# Patient Record
Sex: FEMALE | Race: WHITE | Employment: FULL TIME | URBAN - METROPOLITAN AREA
[De-identification: names, ages, dates, MRNs, and addresses within clinical notes are randomized per-mention and may not be internally consistent; named-entity substitution may affect disease eponyms.]

---

## 2017-05-03 ENCOUNTER — OFFICE VISIT (OUTPATIENT)
Dept: OBGYN CLINIC | Age: 32
End: 2017-05-03

## 2017-05-03 VITALS
HEIGHT: 69 IN | SYSTOLIC BLOOD PRESSURE: 130 MMHG | WEIGHT: 177 LBS | BODY MASS INDEX: 26.22 KG/M2 | DIASTOLIC BLOOD PRESSURE: 84 MMHG

## 2017-05-03 DIAGNOSIS — Z01.419 WELL FEMALE EXAM WITH ROUTINE GYNECOLOGICAL EXAM: Primary | ICD-10-CM

## 2017-05-03 DIAGNOSIS — Z01.419 ENCOUNTER FOR GYNECOLOGICAL EXAMINATION WITHOUT ABNORMAL FINDING: ICD-10-CM

## 2017-05-03 NOTE — MR AVS SNAPSHOT
Visit Information Date & Time Provider Department Dept. Phone Encounter #  
 5/3/2017 10:10 AM Alexandru Mayberry MD Alvin Patel [phone] [id number] Upcoming Health Maintenance Date Due INFLUENZA AGE 9 TO ADULT 8/1/2017 PAP AKA CERVICAL CYTOLOGY 2/22/2019 Allergies as of 5/3/2017  Review Complete On: 5/3/2017 By: Mayra Preciado No Known Allergies Current Immunizations  Reviewed on 9/26/2016 No immunizations on file. Not reviewed this visit You Were Diagnosed With   
  
 Codes Comments Well female exam with routine gynecological exam    -  Primary ICD-10-CM: I66.037 ICD-9-CM: V72.31 Vitals BP Height(growth percentile) Weight(growth percentile) LMP Breastfeeding? BMI  
 130/84 5' 9\" (1.753 m) 177 lb (80.3 kg) (LMP Unknown) Yes 26.14 kg/m2 OB Status Smoking Status Having regular periods Never Smoker BMI and BSA Data Body Mass Index Body Surface Area  
 26.14 kg/m 2 1.98 m 2 Your Updated Medication List  
  
   
This list is accurate as of: 5/3/17 10:22 AM.  Always use your most recent med list.  
  
  
  
  
 oxyCODONE-acetaminophen 5-325 mg per tablet Commonly known as:  PERCOCET Take 1 Tab by mouth every four (4) hours as needed. Max Daily Amount: 6 Tabs. prenatal multivit-ca-min-fe-fa Tab Take  by mouth. We Performed the Following PAP IG, HPV AND RFX HPV 70/20,16(568030) [[id number] Custom] Introducing [facility]! Dear Jessica Carlisle: Thank you for requesting a Andela account. Our records indicate that you already have an active Andela account. You can access your account anytime at [email or web address] Did you know that you can access your hospital and ER discharge instructions at any time in Andela? You can also review all of your test results from your hospital stay or ER visit. Additional Information If you have questions, please visit the Frequently Asked Questions section of the [email or web address] website at [email or web address] Remember, [employer] is NOT to be used for urgent needs. For medical emergencies, dial 911. Now available from your iPhone and Android! Please provide this summary of care documentation to your next provider. Your primary care clinician is listed as NONE. If you have any questions after today's visit, please call [phone].

## 2017-05-03 NOTE — PROGRESS NOTES
Annual exam ages 21-44    Wilber Severin is a ,  28 y.o. female [facility] No LMP recorded (lmp unknown). .    She presents for her annual checkup. She is having no significant problems. With regard to the Gardasil vaccine, she is older than the FDA approved age to receive it. Menstrual status:    Her periods are moderate in flow. She is using three to five pads or tampons per day, usually regular and last 26-30 days. She denies dysmenorrhea. She reports no premenstrual symptoms. Contraception:    The current method of family planning is none. Sexual history:     She  reports that she currently engages in sexual activity and has had male partners. She reports using the following method of birth control/protection: None. .    Medical conditions:    Since her last annual GYN exam about one year ago, she has not the following changes in her health history: none. Pap and Mammogram History:    Her most recent Pap smear was normal, obtained 1 year(s) ago. The patient has never had a mammogram.    Breast Cancer History/Substance Abuse: negative    Past Medical History:   Diagnosis Date    Abnormal Papanicolaou smear of cervix     colposcopy     History reviewed. No pertinent surgical history. Current Outpatient Prescriptions   Medication Sig Dispense Refill    oxyCODONE-acetaminophen (PERCOCET) 5-325 mg per tablet Take 1 Tab by mouth every four (4) hours as needed. Max Daily Amount: 6 Tabs. 30 Tab 0    prenatal multivit-ca-min-fe-fa tab Take  by mouth. Allergies: Review of patient's allergies indicates no known allergies. Tobacco History:  reports that she has never smoked. She does not have any smokeless tobacco history on file. Alcohol Abuse:  reports that she does not drink alcohol. Drug Abuse:  reports that she does not use illicit drugs.     Family Medical/Cancer History:   Family History   Problem Relation Age of Onset    No Known Problems Father Review of Systems - History obtained from the patient  Constitutional: negative for weight loss, fever, night sweats  HEENT: negative for hearing loss, earache, congestion, snoring, sorethroat  CV: negative for chest pain, palpitations, edema  Resp: negative for cough, shortness of breath, wheezing  GI: negative for change in bowel habits, abdominal pain, black or bloody stools  : negative for frequency, dysuria, hematuria, vaginal discharge  MSK: negative for back pain, joint pain, muscle pain  Breast: negative for breast lumps, nipple discharge, galactorrhea  Skin :negative for itching, rash, hives  Neuro: negative for dizziness, headache, confusion, weakness  Psych: negative for anxiety, depression, change in mood  Heme/lymph: negative for bleeding, bruising, pallor    Physical Exam    Visit Vitals    /84    Ht 5' 9\" (1.753 m)    Wt 177 lb (80.3 kg)    LMP  (LMP Unknown)    Breastfeeding Yes    BMI 26.14 kg/m2       Constitutional  · Appearance: well-nourished, well developed, alert, in no acute distress    HENT  · Head and Face: appears normal    Neck  · Inspection/Palpation: normal appearance, no masses or tenderness  · Lymph Nodes: no lymphadenopathy present  · Thyroid: gland size normal, nontender, no nodules or masses present on palpation    Chest  · Respiratory Effort: breathing unlabored    Breasts  · Inspection of Breasts: breasts symmetrical, no skin changes, no discharge present, nipple appearance normal, no skin retraction present  · Palpation of Breasts and Axillae: no masses present on palpation, no breast tenderness  · Axillary Lymph Nodes: no lymphadenopathy present    Gastrointestinal  · Abdominal Examination: abdomen non-tender to palpation, normal bowel sounds, no masses present  · Liver and spleen: no hepatomegaly present, spleen not palpable  · Hernias: no hernias identified    Genitourinary  · External Genitalia: normal appearance for age, no discharge present, no tenderness present, no inflammatory lesions present, no masses present, no atrophy present  · Vagina: normal vaginal vault without central or paravaginal defects, no discharge present, no inflammatory lesions present, no masses present  · Bladder: non-tender to palpation  · Urethra: appears normal  · Cervix: normal   · Uterus: normal size, shape and consistency  · Adnexa: no adnexal tenderness present, no adnexal masses present  · Perineum: perineum within normal limits, no evidence of trauma, no rashes or skin lesions present  · Anus: anus within normal limits, no hemorrhoids present  · Inguinal Lymph Nodes: no lymphadenopathy present    Skin  · General Inspection: no rash, no lesions identified    Neurologic/Psychiatric  · Mental Status:  · Orientation: grossly oriented to person, place and time  · Mood and Affect: mood normal, affect appropriate    Assessment:  Routine gynecologic examination  Her current medical status is satisfactory with no evidence of significant gynecologic issues.     Plan:  Counseled re: diet, exercise, healthy lifestyle  Return for yearly wellness visits  Pt counseled regarding co-testing for high risk HPV with pap  Rec screening mammo at either 36 or 41

## 2017-05-07 LAB
CYTOLOGIST CVX/VAG CYTO: ABNORMAL
CYTOLOGY CVX/VAG DOC THIN PREP: ABNORMAL
DX ICD CODE: ABNORMAL
HPV I/H RISK 1 DNA CVX QL PROBE+SIG AMP: POSITIVE
HPV16 DNA CVX QL PROBE+SIG AMP: NEGATIVE
HPV18 DNA CVX QL PROBE+SIG AMP: NEGATIVE
Lab: ABNORMAL
OTHER STN SPEC: ABNORMAL
PATH REPORT.FINAL DX SPEC: ABNORMAL
STAT OF ADQ CVX/VAG CYTO-IMP: ABNORMAL

## 2017-09-14 ENCOUNTER — OFFICE VISIT (OUTPATIENT)
Dept: OBGYN CLINIC | Age: 32
End: 2017-09-14

## 2017-09-14 VITALS
BODY MASS INDEX: 26.66 KG/M2 | SYSTOLIC BLOOD PRESSURE: 112 MMHG | WEIGHT: 180 LBS | DIASTOLIC BLOOD PRESSURE: 84 MMHG | HEIGHT: 69 IN

## 2017-09-14 DIAGNOSIS — N93.9 ABNORMAL UTERINE BLEEDING (AUB): Primary | ICD-10-CM

## 2017-09-14 LAB
HCG URINE, QL. (POC): NEGATIVE
VALID INTERNAL CONTROL?: YES

## 2017-09-14 RX ORDER — FLUCONAZOLE 150 MG/1
150 TABLET ORAL DAILY
Qty: 3 TAB | Refills: 0 | Status: SHIPPED | OUTPATIENT
Start: 2017-09-14 | End: 2017-09-17

## 2017-09-14 RX ORDER — FLUCONAZOLE 150 MG/1
150 TABLET ORAL DAILY
Qty: 3 TAB | Refills: 0 | Status: SHIPPED | OUTPATIENT
Start: 2017-09-14 | End: 2017-09-14 | Stop reason: SDUPTHER

## 2017-09-14 NOTE — PROGRESS NOTES
Abnormal bleeding note      Luan Keating is a 28 y.o. female who complains of vaginal bleeding problems. Her current method of family planning is rhythm method. She developed this problem approximately 3 months ago. She has had vaginal bleeding which she describes as light lasting up to 7 days. She has had some spotting in between menses and PCB one time. Associated symptoms include none. Alleviating factors: none    Aggravating factors: none      The patient is sexually active. Her relevant past medical history:   Past Medical History:   Diagnosis Date    Abnormal Papanicolaou smear of cervix     colposcopy    Pap smear for cervical cancer screening 05/03/2017    5/3/17 neg HPV+-- rp one year        No past surgical history on file. Social History     Occupational History    Not on file. Social History Main Topics    Smoking status: Never Smoker    Smokeless tobacco: Never Used    Alcohol use No    Drug use: No    Sexual activity: Yes     Partners: Male     Birth control/ protection: None     Family History   Problem Relation Age of Onset    No Known Problems Father        No Known Allergies  Prior to Admission medications    Medication Sig Start Date End Date Taking? Authorizing Provider   prenatal multivit-ca-min-fe-fa tab Take  by mouth. Yes Historical Provider   oxyCODONE-acetaminophen (PERCOCET) 5-325 mg per tablet Take 1 Tab by mouth every four (4) hours as needed. Max Daily Amount: 6 Tabs.  9/26/16   Jarrett Montano MD        Review of Systems - History obtained from the patient  Constitutional: negative for weight loss, fever, night sweats  HEENT: negative for hearing loss, earache, congestion, snoring, sorethroat  CV: negative for chest pain, palpitations, edema  Resp: negative for cough, shortness of breath, wheezing  Breast: negative for breast lumps, nipple discharge, galactorrhea  GI: negative for change in bowel habits, abdominal pain, black or bloody stools  : negative for frequency, dysuria, hematuria  MSK: negative for back pain, joint pain, muscle pain  Skin: negative for itching, rash, hives  Neuro: negative for dizziness, headache, confusion, weakness  Psych: negative for anxiety, depression, change in mood  Heme/lymph: negative for bleeding, bruising, pallor      Objective:    Visit Vitals    /84    Ht 5' 9\" (1.753 m)    Wt 180 lb (81.6 kg)    Breastfeeding No    BMI 26.58 kg/m2          PHYSICAL EXAMINATION    Constitutional  · Appearance: well-nourished, well developed, alert, in no acute distress    HENT  · Head and Face: appears normal    Neck  · Inspection/Palpation: normal appearance, no masses or tenderness  · Lymph Nodes: no lymphadenopathy present  · Thyroid: gland size normal, nontender, no nodules or masses present on palpation    Gastrointestinal  · Abdominal Examination: abdomen non-tender to palpation, normal bowel sounds, no masses present  · Liver and spleen: no hepatomegaly present, spleen not palpable  · Hernias: no hernias identified    Genitourinary  · External Genitalia: normal appearance for age, no discharge present, no tenderness present, no inflammatory lesions present, no masses present, no atrophy present  · Vagina: normal vaginal vault without central or paravaginal defects, no discharge present, no inflammatory lesions present, no masses present  · Bladder: non-tender to palpation  · Urethra: appears normal  · Cervix: normal   · Uterus: normal size, shape and consistency  · Adnexa: no adnexal tenderness present, no adnexal masses present  · Perineum: perineum within normal limits, no evidence of trauma, no rashes or skin lesions present  · Anus: anus within normal limits, no hemorrhoids present  · Inguinal Lymph Nodes: no lymphadenopathy present    Skin  · General Inspection: no rash, no lesions identified    Neurologic/Psychiatric  · Mental Status:  · Orientation: grossly oriented to person, place and time  · Mood and Affect: mood normal, affect appropriate    Assessment/Plan:   Vaginal irritation- will f/u with nuswab and treat for yeast  AUB - one day this cycle only- will observe and keep menstrual calendar    Instructions given to pt. Handouts given to pt.

## 2017-09-14 NOTE — MR AVS SNAPSHOT
Visit Information Date & Time Provider Department Dept. Phone Encounter #  
 9/14/2017  1:40 PM Pili Lorenz MD [facility] [phone] [id number] Your Appointments 10/26/2017 11:00 AM  
New Patient with Mattie Silverio MD  
[facility]) Appt Note: NP est PCP lsp 9/12/17 [address] [facility] [id number]  
[phone]  
  
   
 [address] [facility] [address] Upcoming Health Maintenance Date Due INFLUENZA AGE 9 TO ADULT 8/1/2017 PAP AKA CERVICAL CYTOLOGY 5/3/2020 Allergies as of 9/14/2017  Review Complete On: 9/14/2017 By: Waylon Ronquillo No Known Allergies Current Immunizations  Reviewed on 9/26/2016 No immunizations on file. Not reviewed this visit You Were Diagnosed With   
  
 Codes Comments Abnormal uterine bleeding (AUB)    -  Primary ICD-10-CM: N93.9 ICD-9-CM: 626.9 Vitals BP Height(growth percentile) Weight(growth percentile) Breastfeeding? BMI OB Status 112/84 5' 9\" (1.753 m) 180 lb (81.6 kg) No 26.58 kg/m2 Having regular periods Smoking Status Never Smoker BMI and BSA Data Body Mass Index Body Surface Area  
 26.58 kg/m 2 1.99 m 2 Your Updated Medication List  
  
   
This list is accurate as of: 9/14/17  2:10 PM.  Always use your most recent med list.  
  
  
  
  
 oxyCODONE-acetaminophen 5-325 mg per tablet Commonly known as:  PERCOCET Take 1 Tab by mouth every four (4) hours as needed. Max Daily Amount: 6 Tabs. prenatal multivit-ca-min-fe-fa Tab Take  by mouth. We Performed the Following AMB POC URINE PREGNANCY TEST, VISUAL COLOR COMPARISON [50493 CPT(R)] Introducing [facility]! Dear Phoebe Kwok: Thank you for requesting a BiBCOMt account.   Our records indicate that you already have an active Digital Lifeboat account. You can access your account anytime at [email or web address] Did you know that you can access your hospital and ER discharge instructions at any time in Digital Lifeboat? You can also review all of your test results from your hospital stay or ER visit. Additional Information If you have questions, please visit the Frequently Asked Questions section of the Digital Lifeboat website at [email or web address] Remember, [employer] is NOT to be used for urgent needs. For medical emergencies, dial 911. Now available from your iPhone and Android! Please provide this summary of care documentation to your next provider. Your primary care clinician is listed as NONE. If you have any questions after today's visit, please call [phone].

## 2017-09-17 LAB
A VAGINAE DNA VAG QL NAA+PROBE: ABNORMAL SCORE
BVAB2 DNA VAG QL NAA+PROBE: ABNORMAL SCORE
C ALBICANS DNA VAG QL NAA+PROBE: POSITIVE
C GLABRATA DNA VAG QL NAA+PROBE: NEGATIVE
C TRACH RRNA SPEC QL NAA+PROBE: NEGATIVE
MEGA1 DNA VAG QL NAA+PROBE: ABNORMAL SCORE
N GONORRHOEA RRNA SPEC QL NAA+PROBE: NEGATIVE
T VAGINALIS RRNA SPEC QL NAA+PROBE: NEGATIVE

## 2017-10-26 ENCOUNTER — OFFICE VISIT (OUTPATIENT)
Dept: INTERNAL MEDICINE CLINIC | Age: 32
End: 2017-10-26

## 2017-10-26 VITALS
OXYGEN SATURATION: 99 % | WEIGHT: 179.8 LBS | SYSTOLIC BLOOD PRESSURE: 106 MMHG | DIASTOLIC BLOOD PRESSURE: 68 MMHG | TEMPERATURE: 97.7 F | BODY MASS INDEX: 26.63 KG/M2 | HEIGHT: 69 IN | RESPIRATION RATE: 14 BRPM | HEART RATE: 61 BPM

## 2017-10-26 DIAGNOSIS — Z23 IMMUNIZATION DUE: ICD-10-CM

## 2017-10-26 DIAGNOSIS — Z00.00 WELL ADULT EXAM: Primary | ICD-10-CM

## 2017-10-26 RX ORDER — CIPROFLOXACIN 500 MG/1
500 TABLET ORAL 2 TIMES DAILY
Qty: 6 TAB | Refills: 0 | Status: SHIPPED | OUTPATIENT
Start: 2017-10-26 | End: 2019-02-05

## 2017-10-26 NOTE — PROGRESS NOTES
Raffy Estes is a 33 y.o. female and presents with Establish Care  . Subjective:  Pt presents for NPV. She reports overall good health but has some concerns because her mother has elevated lipids and she has not had hers checked. She had abnormal pap and is followed by gynecology. She is the  at [employer] which is manageable from stress perspective but has 3 yo son who she has in  which she is adjusting to. Overall she is doing well and [place] [facility] [place] [facility]    Possibly cholesterol issues. Brother with possible cholesterol issues as well  No chest pain or SOB  Situational stress - start of school year, weird pressure in chest  She does a lot of walking, she use to run  Trying to run  Doing Whole 30 currently      Review of Systems  Constitutional: negative for fevers, chills, anorexia and weight loss  Eyes:   negative for visual disturbance and irritation  ENT:   negative for tinnitus,sore throat,nasal congestion,ear pains. hoarseness  Respiratory:  negative for cough, hemoptysis, dyspnea,wheezing  CV:   negative for chest pain, palpitations, lower extremity edema  GI:   negative for nausea, vomiting, diarrhea, abdominal pain,melena  Endo:               negative for polyuria,polydipsia,polyphagia,heat intolerance  Genitourinary: negative for frequency, dysuria and hematuria  Integument:  negative for rash and pruritus  Hematologic:  negative for easy bruising and gum/nose bleeding  Musculoskel: negative for myalgias, arthralgias, back pain, muscle weakness, joint pain  Neurological:  negative for headaches, dizziness, vertigo, memory problems and gait   Behavl/Psych: negative for feelings of anxiety, depression, mood changes    Past Medical History:   Diagnosis Date    Abnormal Papanicolaou smear of cervix     colposcopy, dr. Akanksha eckert    Pap smear for cervical cancer screening 05/03/2017    5/3/17 neg HPV+-- rp one year     History reviewed.  No pertinent surgical history. Social History     Social History    Marital status:      Spouse name: N/A    Number of children: N/A    Years of education: N/A     Social History Main Topics    Smoking status: Never Smoker    Smokeless tobacco: Never Used    Alcohol use Yes      Comment: Social     Drug use: No    Sexual activity: Yes     Partners: Male     Birth control/ protection: None     Other Topics Concern    None     Social History Narrative    Full time  for private school    Manageable stress              healthy        Son 2yo -day care will go to [employer] [facility]     Pt was home part time for 11 months         Family History   Problem Relation Age of Onset    No Known Problems Father     Elevated Lipids Mother      Current Outpatient Prescriptions   Medication Sig Dispense Refill    prenatal multivit-ca-min-fe-fa tab Take  by mouth.        No Known Allergies    Objective:  Visit Vitals    /68 (BP 1 Location: Left arm, BP Patient Position: Sitting)    Pulse 61    Temp 97.7 °F (36.5 °C) (Oral)    Resp 14    Ht 5' 9\" (1.753 m)    Wt 179 lb 12.8 oz (81.6 kg)    LMP 10/17/2017 (Approximate)    SpO2 99%    Breastfeeding No    BMI 26.55 kg/m2     Physical Exam:   General appearance - alert, well appearing, and in no distress  Mental status - alert, oriented to person, place, and time  EYE-ANTONIO, EOMI, corneas normal, no foreign bodies, visual acuity normal both eyes, no periorbital cellulitis  ENT-ENT exam normal, no neck nodes or sinus tenderness  Nose - normal and patent, no erythema, discharge or polyps  Mouth - mucous membranes moist, pharynx normal without lesions  Neck - supple, no significant adenopathy   Chest - clear to auscultation, no wheezes, rales or rhonchi, symmetric air entry   Heart - normal rate, regular rhythm, normal S1, S2, no murmurs, rubs, clicks or gallops   Abdomen - soft, nontender, nondistended, no masses or organomegaly  Lymph- no adenopathy palpable  Ext-peripheral pulses normal, no pedal edema, no clubbing or cyanosis  Skin-Warm and dry. no hyperpigmentation, vitiligo, or suspicious lesions  Neuro -alert, oriented, normal speech, no focal findings or movement disorder noted  Neck-normal C-spine, no tenderness, full ROM without pain      Results for orders placed or performed in visit on 09/14/17   NUSWAB VAGINITIS PLUS   Result Value Ref Range    Atopobium vaginae Low - 0 Score    BVAB 2 Low - 0 Score    Megasphaera 1 Low - 0 Score    C. albicans, SHALONDA Positive (A) Negative    C. glabrata, SHALONDA Negative Negative    T. vaginalis, SHALONDA Negative Negative    C. trachomatis, SHALONDA Negative Negative    N. gonorrhoeae, SHALONDA Negative Negative   AMB POC URINE PREGNANCY TEST, VISUAL COLOR COMPARISON   Result Value Ref Range    VALID INTERNAL CONTROL POC Yes     HCG urine, Ql. (POC) Negative Negative     Prevention    Cardiovascular profile  Family hx  Exercising:    Blood pressure:  Health healthy diet:  Diabetes:  Cholesterol:  Renal function:      Cancer risk profile  Mammogram had breast ultrsound 1.5 years ago, felt a lump benign  Lung  Colonoscopy  Skin nonhealing in 2 weeks right blood vessel on side of face benign, not bigger or changing in 4 years  Gyn abnormal bleeding/discharge/abd pain/pressure Dr. Christina Maria, + colposcopy Debra risk DNA      Thyroid sx  weight    Osteopenia prevention  Calcium 1000mg/day yes  Vitamin D 800iu/day yes    Mental health scale:  9.5/10 not depressed or anxious  Situational job related  Depression  Anxiety  Sleep # of hours:  Energy Level:        Immunizations  TDAP  Pneumonia vaccine  Flu vaccine  Shingles vaccine  HPV      Diagnoses and all orders for this visit:    1. Well adult exam  Pt appears in good physical and mental health. She is adjusting getting back to full time and doing well.     -     METABOLIC PANEL, COMPREHENSIVE  -     LIPID PANEL  -     LIPID PANEL  -     METABOLIC PANEL, COMPREHENSIVE    2. Immunization due  -     INFLUENZA VIRUS VACCINE QUADRIVALENT, PRESERVATIVE FREE SYRINGE (25585)    Other orders  May need for travelers diarrhea  -     ciprofloxacin HCl (CIPRO) 500 mg tablet; Take 1 Tab by mouth two (2) times a day. Do not take if pregnant    This note will not be viewable in [address]

## 2017-10-26 NOTE — MR AVS SNAPSHOT
Visit Information Date & Time Provider Department Dept. Phone Encounter #  
 10/26/2017 11:00 AM Radha Simpson MD [facility] [address] [id number] Upcoming Health Maintenance Date Due DTaP/Tdap/Td series (1 - Tdap) 10/20/2007 INFLUENZA AGE 9 TO ADULT 8/1/2017 PAP AKA CERVICAL CYTOLOGY 5/3/2020 Allergies as of 10/26/2017  Review Complete On: 10/26/2017 By: Radha Simpson MD  
 No Known Allergies Current Immunizations  Reviewed on 9/26/2016 Name Date Influenza Vaccine (Quad) PF  Incomplete Tdap 6/26/2016 Not reviewed this visit You Were Diagnosed With   
  
 Codes Comments Well adult exam    -  Primary ICD-10-CM: Z00.00 ICD-9-CM: V70.0 Immunization due     ICD-10-CM: Z23 ICD-9-CM: V05.9 Vitals BP Pulse Temp Resp Height(growth percentile) Weight(growth percentile) 106/68 (BP 1 Location: Left arm, BP Patient Position: Sitting) 61 97.7 °F (36.5 °C) (Oral) 14 5' 9\" (1.753 m) 179 lb 12.8 oz (81.6 kg) LMP SpO2 Breastfeeding? BMI OB Status Smoking Status 10/17/2017 (Approximate) 99% No 26.55 kg/m2 Having regular periods Never Smoker Vitals History BMI and BSA Data Body Mass Index Body Surface Area  
 26.55 kg/m 2 1.99 m 2 Preferred Pharmacy Pharmacy Name Phone [id number], [address] [facility] [phone] Your Updated Medication List  
  
   
This list is accurate as of: 10/26/17 12:08 PM.  Always use your most recent med list.  
  
  
  
  
 ciprofloxacin HCl 500 mg tablet Commonly known as:  CIPRO Take 1 Tab by mouth two (2) times a day. Do not take if pregnant  
  
 prenatal multivit-ca-min-fe-fa Tab Take  by mouth. Prescriptions Sent to Pharmacy Refills  
 ciprofloxacin HCl (CIPRO) 500 mg tablet 0 Sig: Take 1 Tab by mouth two (2) times a day. Do not take if pregnant  Class: Normal  
 Pharmacy: [facility] [address] Ph #: [phone] Route: Oral  
  
We Performed the Following INFLUENZA VIRUS VAC QUAD,SPLIT,PRESV FREE SYRINGE IM [id number] CPT(R)] LIPID PANEL [47127 CPT(R)] LIPID PANEL [08678 CPT(R)] METABOLIC PANEL, COMPREHENSIVE [30339 CPT(R)] METABOLIC PANEL, COMPREHENSIVE [46217 CPT(R)] Patient Instructions Learning About Sleeping Well What does sleeping well mean? Sleeping well means getting enough sleep. How much sleep is enough varies among people. The number of hours you sleep is not as important as how you feel when you wake up. If you do not feel refreshed, you probably need more sleep. Another sign of not getting enough sleep is feeling tired during the day. The average total nightly sleep time is 7½ to 8 hours. Healthy adults may need a little more or a little less than this. Why is getting enough sleep important? Getting enough quality sleep is a basic part of good health. When your sleep suffers, your mood and your thoughts can suffer too. You may find yourself feeling more grumpy or stressed. Not getting enough sleep also can lead to serious problems, including injury, accidents, anxiety, and depression. What might cause poor sleeping? Many things can cause sleep problems, including: · Stress. Stress can be caused by fear about a single event, such as giving a speech. Or you may have ongoing stress, such as worry about work or school. · Depression, anxiety, and other mental or emotional conditions. · Changes in your sleep habits or surroundings. This includes changes that happen where you sleep, such as noise, light, or sleeping in a different bed. It also includes changes in your sleep pattern, such as having jet lag or working a late shift. · Health problems, such as pain, breathing problems, and restless legs syndrome. · Lack of regular exercise. How can you help yourself? Here are some tips that may help you sleep more soundly and wake up feeling more refreshed. Your sleeping area · Use your bedroom only for sleeping and sex. A bit of light reading may help you fall asleep. But if it doesn't, do your reading elsewhere in the house. Don't watch TV in bed. · Be sure your bed is big enough to stretch out comfortably, especially if you have a sleep partner. · Keep your bedroom quiet, dark, and cool. Use curtains, blinds, or a sleep mask to block out light. To block out noise, use earplugs, soothing music, or a \"white noise\" machine. Your evening and bedtime routine · Create a relaxing bedtime routine. You might want to take a warm shower or bath, listen to soothing music, or drink a cup of noncaffeinated tea. · Go to bed at the same time every night. And get up at the same time every morning, even if you feel tired. What to avoid · Limit caffeine (coffee, tea, caffeinated sodas) during the day, and don't have any for at least 4 to 6 hours before bedtime. · Don't drink alcohol before bedtime. Alcohol can cause you to wake up more often during the night. · Don't smoke or use tobacco, especially in the evening. Nicotine can keep you awake. · Don't take naps during the day, especially close to bedtime. · Don't lie in bed awake for too long. If you can't fall asleep, or if you wake up in the middle of the night and can't get back to sleep within 15 minutes or so, get out of bed and go to another room until you feel sleepy. · Don't take medicine right before bed that may keep you awake or make you feel hyper or energized. Your doctor can tell you if your medicine may do this and if you can take it earlier in the day. If you can't sleep · Imagine yourself in a peaceful, pleasant scene. Focus on the details and feelings of being in a place that is relaxing. · Get up and do a quiet or boring activity until you feel sleepy. · Don't drink any liquids after 6 p.m. if you wake up often because you have to go to the bathroom. Where can you learn more? Go to http://piero-audrey.info/. Enter V884 in the search box to learn more about \"Learning About Sleeping Well. \" Current as of: May 12, 2017 Content Version: 11.4 © 9103-6922 Klip.in. Care instructions adapted under license by Tresata (which disclaims liability or warranty for this information). If you have questions about a medical condition or this instruction, always ask your healthcare professional. [place] [address] any warranty or liability for your use of this information. Introducing [facility] & HEALTH SERVICES! Dear Karma Salcedo: Thank you for requesting a [employer] account. Our records indicate that you already have an active [employer] account. You can access your account anytime at [email or web address] Did you know that you can access your hospital and ER discharge instructions at any time in [employer]? You can also review all of your test results from your hospital stay or ER visit. Additional Information If you have questions, please visit the Frequently Asked Questions section of the [employer] website at [email or web address] Remember, [employer] is NOT to be used for urgent needs. For medical emergencies, dial 911. Now available from your iPhone and Android! Please provide this summary of care documentation to your next provider. Your primary care clinician is listed as Hans Cuello. If you have any questions after today's visit, please call [phone].

## 2017-10-26 NOTE — PATIENT INSTRUCTIONS

## 2017-10-27 LAB
ALBUMIN SERPL-MCNC: 4.9 G/DL (ref 3.5–5.5)
ALBUMIN/GLOB SERPL: 1.9 {RATIO} (ref 1.2–2.2)
ALP SERPL-CCNC: 63 IU/L (ref 39–117)
ALT SERPL-CCNC: 18 IU/L (ref 0–32)
AST SERPL-CCNC: 18 IU/L (ref 0–40)
BILIRUB SERPL-MCNC: 0.8 MG/DL (ref 0–1.2)
BUN SERPL-MCNC: 14 MG/DL (ref 6–20)
BUN/CREAT SERPL: 19 (ref 9–23)
CALCIUM SERPL-MCNC: 9.3 MG/DL (ref 8.7–10.2)
CHLORIDE SERPL-SCNC: 98 MMOL/L (ref 96–106)
CHOLEST SERPL-MCNC: 213 MG/DL (ref 100–199)
CO2 SERPL-SCNC: 24 MMOL/L (ref 18–29)
CREAT SERPL-MCNC: 0.73 MG/DL (ref 0.57–1)
GFR SERPLBLD CREATININE-BSD FMLA CKD-EPI: 110 ML/MIN/1.73
GFR SERPLBLD CREATININE-BSD FMLA CKD-EPI: 127 ML/MIN/1.73
GLOBULIN SER CALC-MCNC: 2.6 G/DL (ref 1.5–4.5)
GLUCOSE SERPL-MCNC: 78 MG/DL (ref 65–99)
HDLC SERPL-MCNC: 58 MG/DL
INTERPRETATION, 910389: NORMAL
LDLC SERPL CALC-MCNC: 141 MG/DL (ref 0–99)
POTASSIUM SERPL-SCNC: 4.4 MMOL/L (ref 3.5–5.2)
PROT SERPL-MCNC: 7.5 G/DL (ref 6–8.5)
SODIUM SERPL-SCNC: 137 MMOL/L (ref 134–144)
TRIGL SERPL-MCNC: 72 MG/DL (ref 0–149)
VLDLC SERPL CALC-MCNC: 14 MG/DL (ref 5–40)

## 2018-02-22 ENCOUNTER — OFFICE VISIT (OUTPATIENT)
Dept: INTERNAL MEDICINE CLINIC | Age: 33
End: 2018-02-22

## 2018-02-22 VITALS
BODY MASS INDEX: 26.22 KG/M2 | HEART RATE: 75 BPM | RESPIRATION RATE: 16 BRPM | SYSTOLIC BLOOD PRESSURE: 120 MMHG | HEIGHT: 69 IN | WEIGHT: 177 LBS | DIASTOLIC BLOOD PRESSURE: 69 MMHG | TEMPERATURE: 98.1 F | OXYGEN SATURATION: 98 %

## 2018-02-22 DIAGNOSIS — I49.9 IRREGULAR HEART BEAT: ICD-10-CM
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
## 2018-02-22 DIAGNOSIS — R53.83 FATIGUE, UNSPECIFIED TYPE: Primary | ICD-10-CM

## 2018-02-22 NOTE — MR AVS SNAPSHOT
[facility] 
 
 
 [address] [place], [place] [address] [facility] 
[phone] Patient: Fito Chowdhury MRN: [id number] :1985 Visit Information Date & Time Provider Department Dept. Phone Encounter #  
 2018  8:00 AM Michelle Gibbons MD [facility] [address] [id number] Upcoming Health Maintenance Date Due  
 PAP AKA CERVICAL CYTOLOGY 5/3/2020 DTaP/Tdap/Td series (2 - Td) 2026 Allergies as of 2018  Review Complete On:  By: Kellee Balbuena No Known Allergies Current Immunizations  Reviewed on 10/26/2017 Name Date Influenza Vaccine (Quad) PF 10/26/2017 Tdap 2016 Not reviewed this visit You Were Diagnosed With   
  
 Codes Comments Fatigue, unspecified type    -  Primary ICD-10-CM: R53.83 ICD-9-CM: 780.79 Bug bite, sequela     ICD-10-CM: W57. Shawanda Hernandez 
ICD-9-CM: 906.2 Irregular heart beat     ICD-10-CM: I49.9 ICD-9-CM: 427.9 Vitals BP Pulse Temp Resp Height(growth percentile) Weight(growth percentile) 120/69 (BP 1 Location: Left arm, BP Patient Position: Sitting) 75 98.1 °F (36.7 °C) (Oral) 16 5' 9\" (1.753 m) 177 lb (80.3 kg) LMP SpO2 Breastfeeding? BMI OB Status Smoking Status 2018 98% No 26.14 kg/m2 Having regular periods Never Smoker Vitals History BMI and BSA Data Body Mass Index Body Surface Area  
 26.14 kg/m 2 1.98 m 2 Preferred Pharmacy Pharmacy Name Phone [id number], [address] [phone] Your Updated Medication List  
  
   
This list is accurate as of 18  8:57 AM.  Always use your most recent med list.  
  
  
  
  
 ciprofloxacin HCl 500 mg tablet Commonly known as:  CIPRO Take 1 Tab by mouth two (2) times a day. Do not take if pregnant  
  
 prenatal multivit-ca-min-fe-fa Tab Take  by mouth. We Performed the Following AMB POC EKG ROUTINE W/ 12 LEADS, INTER & REP [56713 CPT(R)] CBC WITH AUTOMATED DIFF [80471 CPT(R)] METABOLIC PANEL, COMPREHENSIVE [43007 CPT(R)] Patient Instructions Insect Stings and Bites: Care Instructions Your Care Instructions Stings and bites from bees, wasps, ants, and other insects often cause pain, swelling, redness, and itching. In some people, especially children, the redness and swelling may be worse. It may extend several inches beyond the affected area. But in most cases, stings and bites don't cause reactions all over the body. If you have had a reaction to an insect sting or bite, you are at risk for a reaction if you get stung or bitten again. Follow-up care is a key part of your treatment and safety. Be sure to make and go to all appointments, and call your doctor if you are having problems. It's also a good idea to know your test results and keep a list of the medicines you take. How can you care for yourself at home? · Do not scratch or rub the skin where the sting or bite occurred. · Put a cold pack or ice cube on the area. Put a thin cloth between the ice and your skin. For some people, a paste of baking soda mixed with a little water helps relieve pain and decrease the reaction. · Take an over-the-counter antihistamine, such as diphenhydramine (Benadryl) or loratadine (Claritin), to relieve swelling, redness, and itching. Calamine lotion or hydrocortisone cream may also help. Do not give antihistamines to your child unless you have checked with the doctor first. 
· Be safe with medicines. If your doctor prescribed medicine for your allergy, take it exactly as prescribed. Call your doctor if you think you are having a problem with your medicine. You will get more details on the specific medicines your doctor prescribes. · Your doctor may prescribe a shot of epinephrine to carry with you in case you have a severe reaction.  Learn how and when to give yourself the shot, and keep it with you at all times. Make sure it has not . · Go to the emergency room anytime you have a severe reaction. Go even if you have given yourself epinephrine and are feeling better. Symptoms can come back. When should you call for help? Call 911 anytime you think you may need emergency care. For example, call if: 
? · You have symptoms of a severe allergic reaction. These may include: 
¨ Sudden raised, red areas (hives) all over your body. ¨ Swelling of the throat, mouth, lips, or tongue. ¨ Trouble breathing. ¨ Passing out (losing consciousness). Or you may feel very lightheaded or suddenly feel weak, confused, or restless. ?Call your doctor now or seek immediate medical care if: 
? · You have symptoms of an allergic reaction not right at the sting or bite, such as: ¨ A rash or small area of hives (raised, red areas on the skin). ¨ Itching. ¨ Swelling. ¨ Belly pain, nausea, or vomiting. ? · You have a lot of swelling around the site (such as your entire arm or leg is swollen). ? · You have signs of infection, such as: 
¨ Increased pain, swelling, redness, or warmth around the sting. ¨ Red streaks leading from the area. ¨ Pus draining from the sting. ¨ A fever. ? Watch closely for changes in your health, and be sure to contact your doctor if: 
? · You do not get better as expected. Where can you learn more? Go to http://piero-audrey.info/. Enter P390 in the search box to learn more about \"Insect Stings and Bites: Care Instructions. \" Current as of: 2017 Content Version: 11.4 © 6919-7594 Activiomics. Care instructions adapted under license by Mozio (which disclaims liability or warranty for this information). If you have questions about a medical condition or this instruction, always ask your healthcare professional. [address] any warranty or liability for your use of this information. Introducing [facility] & HEALTH SERVICES! Dear Alicia Martins: Thank you for requesting a [employer] account. Our records indicate that you already have an active [employer] account. You can access your account anytime at [email or web address] Did you know that you can access your hospital and ER discharge instructions at any time in [employer]? You can also review all of your test results from your hospital stay or ER visit. Additional Information If you have questions, please visit the Frequently Asked Questions section of the [employer] website at [email or web address] Remember, [employer] is NOT to be used for urgent needs. For medical emergencies, dial 911. Now available from your iPhone and Android! Please provide this summary of care documentation to your next provider. Your primary care clinician is listed as Rhonda Michael. If you have any questions after today's visit, please call [phone].

## 2018-02-22 NOTE — PROGRESS NOTES
Manuel Menchaca is a 33 y.o. female who presents today for Insect Bite and Irregular Heart Beat  . She has a history of   Patient Active Problem List   Diagnosis Code   (none) - all problems resolved or deleted   . Today patient is here for an acute visit. she does not have other concerns. Problem visit:  Manuel Menchaca is here for complaint of feeling poorly and insect bite. Problem began 2/15. Bite to L ankle. Area itchy and dark. Unclear what bit her. Saturday began feeling sick. No fevers, but started having fatigue. Achy ness. Had a little bit of a cough. Congestion. H/A. Today beginning to feel better. Area where bit, improved. No s/s of cellulitis. Notes some arrhythmias. Pulse feels irregular. Was in [place] where there has been cases of Zika. Pt plans on trying to become pregnant. ROS  Review of Systems   Constitutional: Positive for malaise/fatigue. Negative for chills, fever and weight loss. HENT: Negative for congestion, ear discharge, ear pain, hearing loss, nosebleeds and tinnitus. Eyes: Negative for blurred vision, double vision, photophobia and pain. Respiratory: Negative for cough. Cardiovascular: Positive for palpitations. Negative for chest pain, orthopnea, claudication, leg swelling and PND. Gastrointestinal: Negative for abdominal pain, diarrhea, heartburn, nausea and vomiting. Musculoskeletal: Negative for joint pain. Skin: Positive for itching and rash. Neurological: Positive for headaches. Negative for dizziness and tingling. Visit Vitals    /69 (BP 1 Location: Left arm, BP Patient Position: Sitting)    Pulse 75    Temp 98.1 °F (36.7 °C) (Oral)    Resp 16    Ht 5' 9\" (1.753 m)    Wt 177 lb (80.3 kg)    SpO2 98%    Breastfeeding No    BMI 26.14 kg/m2       Physical Exam   Constitutional: She is oriented to person, place, and time. She appears well-developed and well-nourished.    HENT:   Head: Normocephalic and atraumatic. Cardiovascular: Normal rate and regular rhythm. No murmur heard. Pulmonary/Chest: Effort normal. No respiratory distress. Musculoskeletal:        Feet:    Resolving light red skin lesion   Neurological: She is alert and oriented to person, place, and time. Skin: Skin is warm and dry. Psychiatric: She has a normal mood and affect. Her behavior is normal.         Current Outpatient Prescriptions   Medication Sig    prenatal multivit-ca-min-fe-fa tab Take  by mouth.  ciprofloxacin HCl (CIPRO) 500 mg tablet Take 1 Tab by mouth two (2) times a day. Do not take if pregnant     No current facility-administered medications for this visit. Past Medical History:   Diagnosis Date    Abnormal Papanicolaou smear of cervix     colposcopy, dr. Annmarie Baum Pap smear for cervical cancer screening 05/03/2017    5/3/17 neg HPV+-- rp one year      History reviewed. No pertinent surgical history. Social History   Substance Use Topics    Smoking status: Never Smoker    Smokeless tobacco: Never Used    Alcohol use Yes      Comment: Social       Family History   Problem Relation Age of Onset    No Known Problems Father     Elevated Lipids Mother         No Known Allergies     Assessment/Plan  Diagnoses and all orders for this visit:    1. Fatigue, unspecified type - sounds like viral URI, but given potential bug bite, suggest that she wait 8 weeks before getting pregnant. Will not send zika. Check blood work to r/o any acute liver abnormality. No conduction abnormality on EKG. -     CBC WITH AUTOMATED DIFF  -     METABOLIC PANEL, COMPREHENSIVE    2. Bug bite, sequela  -     CBC WITH AUTOMATED DIFF  -     METABOLIC PANEL, COMPREHENSIVE    3. Irregular heart beat  -     AMB POC EKG ROUTINE W/ 12 LEADS, INTER & REP        Follow-up Disposition:  Return if symptoms worsen or fail to improve.     Julien Cao MD  2/22/2018

## 2018-02-22 NOTE — PATIENT INSTRUCTIONS
Insect Stings and Bites: Care Instructions  Your Care Instructions  Stings and bites from bees, wasps, ants, and other insects often cause pain, swelling, redness, and itching. In some people, especially children, the redness and swelling may be worse. It may extend several inches beyond the affected area. But in most cases, stings and bites don't cause reactions all over the body. If you have had a reaction to an insect sting or bite, you are at risk for a reaction if you get stung or bitten again. Follow-up care is a key part of your treatment and safety. Be sure to make and go to all appointments, and call your doctor if you are having problems. It's also a good idea to know your test results and keep a list of the medicines you take. How can you care for yourself at home? · Do not scratch or rub the skin where the sting or bite occurred. · Put a cold pack or ice cube on the area. Put a thin cloth between the ice and your skin. For some people, a paste of baking soda mixed with a little water helps relieve pain and decrease the reaction. · Take an over-the-counter antihistamine, such as diphenhydramine (Benadryl) or loratadine (Claritin), to relieve swelling, redness, and itching. Calamine lotion or hydrocortisone cream may also help. Do not give antihistamines to your child unless you have checked with the doctor first.  · Be safe with medicines. If your doctor prescribed medicine for your allergy, take it exactly as prescribed. Call your doctor if you think you are having a problem with your medicine. You will get more details on the specific medicines your doctor prescribes. · Your doctor may prescribe a shot of epinephrine to carry with you in case you have a severe reaction. Learn how and when to give yourself the shot, and keep it with you at all times. Make sure it has not . · Go to the emergency room anytime you have a severe reaction.  Go even if you have given yourself epinephrine and are feeling better. Symptoms can come back. When should you call for help? Call 911 anytime you think you may need emergency care. For example, call if:  ? · You have symptoms of a severe allergic reaction. These may include:  ¨ Sudden raised, red areas (hives) all over your body. ¨ Swelling of the throat, mouth, lips, or tongue. ¨ Trouble breathing. ¨ Passing out (losing consciousness). Or you may feel very lightheaded or suddenly feel weak, confused, or restless. ?Call your doctor now or seek immediate medical care if:  ? · You have symptoms of an allergic reaction not right at the sting or bite, such as:  ¨ A rash or small area of hives (raised, red areas on the skin). ¨ Itching. ¨ Swelling. ¨ Belly pain, nausea, or vomiting. ? · You have a lot of swelling around the site (such as your entire arm or leg is swollen). ? · You have signs of infection, such as:  ¨ Increased pain, swelling, redness, or warmth around the sting. ¨ Red streaks leading from the area. ¨ Pus draining from the sting. ¨ A fever. ? Watch closely for changes in your health, and be sure to contact your doctor if:  ? · You do not get better as expected. Where can you learn more? Go to http://piero-audrey.info/. Enter P390 in the search box to learn more about \"Insect Stings and Bites: Care Instructions. \"  Current as of: March 20, 2017  Content Version: 11.4  © 0343-3565 [employer]ion AG. Care instructions adapted under license by Datanomic (which disclaims liability or warranty for this information). If you have questions about a medical condition or this instruction, always ask your healthcare professional. [place] [address] any warranty or liability for your use of this information.

## 2018-02-23 LAB
ALBUMIN SERPL-MCNC: 4.5 G/DL (ref 3.5–5.5)
ALBUMIN/GLOB SERPL: 1.6 {RATIO} (ref 1.2–2.2)
ALP SERPL-CCNC: 57 IU/L (ref 39–117)
ALT SERPL-CCNC: 17 IU/L (ref 0–32)
AST SERPL-CCNC: 16 IU/L (ref 0–40)
BASOPHILS # BLD AUTO: 0 X10E3/UL (ref 0–0.2)
BASOPHILS NFR BLD AUTO: 1 %
BILIRUB SERPL-MCNC: 0.5 MG/DL (ref 0–1.2)
BUN SERPL-MCNC: 14 MG/DL (ref 6–20)
BUN/CREAT SERPL: 22 (ref 9–23)
CALCIUM SERPL-MCNC: 9.4 MG/DL (ref 8.7–10.2)
CHLORIDE SERPL-SCNC: 102 MMOL/L (ref 96–106)
CO2 SERPL-SCNC: 25 MMOL/L (ref 18–29)
CREAT SERPL-MCNC: 0.65 MG/DL (ref 0.57–1)
EOSINOPHIL # BLD AUTO: 0.1 X10E3/UL (ref 0–0.4)
EOSINOPHIL NFR BLD AUTO: 2 %
ERYTHROCYTE [DISTWIDTH] IN BLOOD BY AUTOMATED COUNT: 14.1 % (ref 12.3–15.4)
GFR SERPLBLD CREATININE-BSD FMLA CKD-EPI: 119 ML/MIN/{1.73_M2}
GFR SERPLBLD CREATININE-BSD FMLA CKD-EPI: 137 ML/MIN/{1.73_M2}
GLOBULIN SER CALC-MCNC: 2.8 G/L (ref 1.5–4.5)
GLUCOSE SERPL-MCNC: 86 MG/DL (ref 65–99)
HCT VFR BLD AUTO: 41.6 % (ref 34–46.6)
HGB BLD-MCNC: 13.5 G/DL (ref 11.1–15.9)
IMM GRANULOCYTES # BLD: 0 X10E3/UL (ref 0–0.1)
IMM GRANULOCYTES NFR BLD: 0 %
LYMPHOCYTES # BLD AUTO: 2.8 X10E3/UL (ref 0.7–3.1)
LYMPHOCYTES NFR BLD AUTO: 43 %
MCH RBC QN AUTO: 28.5 PG (ref 26.6–33)
MCHC RBC AUTO-ENTMCNC: 32.5 G/DL (ref 31.5–35.7)
MCV RBC AUTO: 88 FL (ref 79–97)
MONOCYTES # BLD AUTO: 0.7 X10E3/UL (ref 0.1–0.9)
MONOCYTES NFR BLD AUTO: 10 %
NEUTROPHILS # BLD AUTO: 2.8 X10E3/UL (ref 1.4–7)
NEUTROPHILS NFR BLD AUTO: 44 %
PLATELET # BLD AUTO: 355 X10E3/UL (ref 150–379)
POTASSIUM SERPL-SCNC: 4.4 MMOL/L (ref 3.5–5.2)
PROT SERPL-MCNC: 7.3 G/DL (ref 6–8.5)
RBC # BLD AUTO: 4.74 X10E6/UL (ref 3.77–5.28)
SODIUM SERPL-SCNC: 141 MMOL/L (ref 134–144)
WBC # BLD AUTO: 6.4 X10E3/UL (ref 3.4–10.8)

## 2018-05-01 ENCOUNTER — OFFICE VISIT (OUTPATIENT)
Dept: OBGYN CLINIC | Age: 33
End: 2018-05-01

## 2018-05-01 VITALS
BODY MASS INDEX: 26.07 KG/M2 | WEIGHT: 176 LBS | SYSTOLIC BLOOD PRESSURE: 120 MMHG | DIASTOLIC BLOOD PRESSURE: 70 MMHG | HEIGHT: 69 IN

## 2018-05-01 DIAGNOSIS — Z01.419 WELL FEMALE EXAM WITH ROUTINE GYNECOLOGICAL EXAM: Primary | ICD-10-CM

## 2018-05-01 NOTE — PROGRESS NOTES
Annual exam ages 21-44    Sara Ku is a ,  33 y.o. female [facility] Patient's last menstrual period was 2018 (exact date). .    She presents for her annual checkup. She is having no significant problems. She recently had a SAB 2018 and would like to discuss it as this is her second SAB. With regard to the Gardasil vaccine, she is older than the FDA approved age to receive it. Menstrual status:    Her periods are moderate in flow. She is using three to five pads or tampons per day, usually regular and last 26-30 days. She denies dysmenorrhea. She reports no premenstrual symptoms. Contraception:    The current method of family planning is none. Sexual history:     She  reports that she currently engages in sexual activity and has had male partners. She reports using the following method of birth control/protection: None. .    Medical conditions:    Since her last annual GYN exam about one year ago, she has not the following changes in her health history: none. Pap and Mammogram History:    Her most recent Pap smear was abnormal, obtained 1 year(s) ago. The patient has never had a mammogram.    Breast Cancer History/Substance Abuse: negative    Past Medical History:   Diagnosis Date    Abnormal Papanicolaou smear of cervix     colposcopy, dr. Geeta eckert    Pap smear for cervical cancer screening 2017 neg HPV POS     History reviewed. No pertinent surgical history. Current Outpatient Prescriptions   Medication Sig Dispense Refill    prenatal multivit-ca-min-fe-fa tab Take  by mouth.  ciprofloxacin HCl (CIPRO) 500 mg tablet Take 1 Tab by mouth two (2) times a day. Do not take if pregnant 6 Tab 0     Allergies: Review of patient's allergies indicates no known allergies. Tobacco History:  reports that she has never smoked. She has never used smokeless tobacco.  Alcohol Abuse:  reports that she drinks alcohol.   Drug Abuse:  reports that she does not use illicit drugs.     Family Medical/Cancer History:   Family History   Problem Relation Age of Onset    No Known Problems Father     Elevated Lipids Mother         Review of Systems - History obtained from the patient  Constitutional: negative for weight loss, fever, night sweats  HEENT: negative for hearing loss, earache, congestion, snoring, sorethroat  CV: negative for chest pain, palpitations, edema  Resp: negative for cough, shortness of breath, wheezing  GI: negative for change in bowel habits, abdominal pain, black or bloody stools  : negative for frequency, dysuria, hematuria, vaginal discharge  MSK: negative for back pain, joint pain, muscle pain  Breast: negative for breast lumps, nipple discharge, galactorrhea  Skin :negative for itching, rash, hives  Neuro: negative for dizziness, headache, confusion, weakness  Psych: negative for anxiety, depression, change in mood  Heme/lymph: negative for bleeding, bruising, pallor    Physical Exam    Visit Vitals    /70    Ht 5' 9\" (1.753 m)    Wt 176 lb (79.8 kg)    LMP 04/20/2018 (Exact Date)    Breastfeeding No    BMI 25.99 kg/m2       Constitutional  · Appearance: well-nourished, well developed, alert, in no acute distress    HENT  · Head and Face: appears normal    Neck  · Inspection/Palpation: normal appearance, no masses or tenderness  · Lymph Nodes: no lymphadenopathy present  · Thyroid: gland size normal, nontender, no nodules or masses present on palpation    Chest  · Respiratory Effort: breathing unlabored    Breasts  · Inspection of Breasts: breasts symmetrical, no skin changes, no discharge present, nipple appearance normal, no skin retraction present  · Palpation of Breasts and Axillae: no masses present on palpation, no breast tenderness  · Axillary Lymph Nodes: no lymphadenopathy present    Gastrointestinal  · Abdominal Examination: abdomen non-tender to palpation, normal bowel sounds, no masses present  · Liver and spleen: no hepatomegaly present, spleen not palpable  · Hernias: no hernias identified    Genitourinary  · External Genitalia: normal appearance for age, no discharge present, no tenderness present, no inflammatory lesions present, no masses present, no atrophy present  · Vagina: normal vaginal vault without central or paravaginal defects, no discharge present, no inflammatory lesions present, no masses present  · Bladder: non-tender to palpation  · Urethra: appears normal  · Cervix: normal   · Uterus: normal size, shape and consistency  · Adnexa: no adnexal tenderness present, no adnexal masses present  · Perineum: perineum within normal limits, no evidence of trauma, no rashes or skin lesions present  · Anus: anus within normal limits, no hemorrhoids present  · Inguinal Lymph Nodes: no lymphadenopathy present    Skin  · General Inspection: no rash, no lesions identified    Neurologic/Psychiatric  · Mental Status:  · Orientation: grossly oriented to person, place and time  · Mood and Affect: mood normal, affect appropriate    Assessment:  Routine gynecologic examination  Her current medical status is satisfactory with no evidence of significant gynecologic issues.     Plan:  Counseled re: diet, exercise, healthy lifestyle  Return for yearly wellness visits  Pt counseled regarding co-testing for high risk HPV with pap  Rec screening mammo at either 36 or 41

## 2018-05-04 LAB
CYTOLOGIST CVX/VAG CYTO: NORMAL
CYTOLOGY CVX/VAG DOC THIN PREP: NORMAL
DX ICD CODE: NORMAL
HPV I/H RISK 1 DNA CVX QL PROBE+SIG AMP: NORMAL
HPV I/H RISK 4 DNA CVX QL PROBE+SIG AMP: NEGATIVE
Lab: NORMAL
Lab: NORMAL
OTHER STN SPEC: NORMAL
PATH REPORT.FINAL DX SPEC: NORMAL
STAT OF ADQ CVX/VAG CYTO-IMP: NORMAL

## 2018-07-25 ENCOUNTER — OFFICE VISIT (OUTPATIENT)
Dept: OBGYN CLINIC | Age: 33
End: 2018-07-25

## 2018-07-25 DIAGNOSIS — Z32.01 POSITIVE PREGNANCY TEST: Primary | ICD-10-CM

## 2018-07-25 DIAGNOSIS — Z72.51 UNPROTECTED SEXUAL INTERCOURSE: ICD-10-CM

## 2018-07-25 NOTE — PROGRESS NOTES
Problem Visit-Limited    Chief Complaint   No chief complaint on file. MICHAELA Menezes is a 33 y.o. female who presents for the evaluation of chemical pregnancies. Patient's last menstrual period was 07/01/2018 (exact date). The patient complains of missed menses with + UPT's then vaginal bleeding, this has occurred the last 3 months. .  She is currently trying for pregnancy. She denies pelvic pain and fever. Past Medical History:   Diagnosis Date    Abnormal Papanicolaou smear of cervix     colposcopy, dr. Judith Yang Pap smear for cervical cancer screening 05/03/2017 neg HPV POS     No past surgical history on file. Social History     Occupational History    Not on file. Social History Main Topics    Smoking status: Never Smoker    Smokeless tobacco: Never Used    Alcohol use Yes      Comment: Social     Drug use: No    Sexual activity: Yes     Partners: Male     Birth control/ protection: None     Family History   Problem Relation Age of Onset    No Known Problems Father     Elevated Lipids Mother        No Known Allergies  Prior to Admission medications    Medication Sig Start Date End Date Taking? Authorizing Provider   prenatal multivit-ca-min-fe-fa tab Take  by mouth. Yes Historical Provider   ciprofloxacin HCl (CIPRO) 500 mg tablet Take 1 Tab by mouth two (2) times a day.  Do not take if pregnant 10/26/17   Loni Roque MD        Review of Systems: History obtained from the patient  Constitutional: negative for weight loss, fever, night sweats  Breast: negative for breast lumps, nipple discharge, galactorrhea  GI: negative for change in bowel habits, abdominal pain, black or bloody stools  : negative for frequency, dysuria, hematuria, vaginal discharge  MSK: negative for back pain, joint pain, muscle pain  Skin: negative for itching, rash, hives  Psych: negative for anxiety, depression, change in mood      Objective:  Visit Vitals    LMP 07/01/2018 (Exact Date)    Breastfeeding No       Physical Exam:     Constitutional  · Appearance: well-nourished, well developed, alert, in no acute distress    Skin  · General Inspection: no rash, no lesions identified    Neurologic/Psychiatric  · Mental Status:  · Orientation: grossly oriented to person, place and time  · Mood and Affect: mood normal, affect appropriate    Assessment/Plan:  Positive pregnancy tests, discussed likely false positive as they have been positive each month followed by negative tests. Will check hcg today and rto with next positive test for repeat blood work. RTO prn if symptoms persist or worsen. Instructions given to pt. Handouts given to pt.

## 2018-07-25 NOTE — MR AVS SNAPSHOT
[address] [facility] [address] [facility] 
[phone] Patient: Chinmay Cooper MRN: [id number] :1985 Visit Information Date & Time Provider Department Dept. Phone Encounter #  
 2018  2:40 PM Rosa Rutherford MD Alvin Patel [phone] [id number] Upcoming Health Maintenance Date Due Influenza Age 5 to Adult 2018 PAP AKA CERVICAL CYTOLOGY 2021 Allergies as of 2018  Review Complete On: 2018 By: Isamar Madera No Known Allergies Current Immunizations  Reviewed on 10/26/2017 Name Date Influenza Vaccine (Quad) PF 10/26/2017 Tdap 2016 Not reviewed this visit You Were Diagnosed With   
  
 Codes Comments Unprotected sexual intercourse    -  Primary ICD-10-CM: Z72.51 
ICD-9-CM: V69.2 Vitals LMP Breastfeeding? OB Status Smoking Status 2018 (Exact Date) No Having regular periods Never Smoker Preferred Pharmacy Pharmacy Name Phone [id number], [address] [phone] Your Updated Medication List  
  
   
This list is accurate as of 18  3:29 PM.  Always use your most recent med list.  
  
  
  
  
 ciprofloxacin HCl 500 mg tablet Commonly known as:  CIPRO Take 1 Tab by mouth two (2) times a day. Do not take if pregnant  
  
 prenatal multivit-ca-min-fe-fa Tab Take  by mouth. We Performed the Following BETA HCG, QT [id number] CPT(R)] Introducing [facility]! Dear Umer Escoto: Thank you for requesting a [employer] account. Our records indicate that you already have an active LOVE[employer] account. You can access your account anytime at [email or web address] Did you know that you can access your hospital and ER discharge instructions at any time in LOVE[employer]IGN?   You can also review all of your test results from your hospital stay or ER visit. Additional Information If you have questions, please visit the Frequently Asked Questions section of the [employer] website at [email or web address] Remember, [employer] is NOT to be used for urgent needs. For medical emergencies, dial 911. Now available from your iPhone and Android! Please provide this summary of care documentation to your next provider. Your primary care clinician is listed as Ricky Barnett. If you have any questions after today's visit, please call [phone].

## 2018-07-26 LAB — HCG INTACT+B SERPL-ACNC: <1 MIU/ML

## 2018-11-21 ENCOUNTER — LAB ONLY (OUTPATIENT)
Dept: OBGYN CLINIC | Age: 33
End: 2018-11-21

## 2018-11-21 DIAGNOSIS — N92.6 IRREGULAR MENSES: Primary | ICD-10-CM

## 2018-11-25 LAB
17OHP SERPL-MCNC: 288 NG/DL
ALBUMIN SERPL-MCNC: 4.4 G/DL (ref 3.5–5.5)
ALBUMIN/GLOB SERPL: 1.6 {RATIO} (ref 1.2–2.2)
ALP SERPL-CCNC: 47 IU/L (ref 39–117)
ALT SERPL-CCNC: 16 IU/L (ref 0–32)
AST SERPL-CCNC: 18 IU/L (ref 0–40)
BILIRUB SERPL-MCNC: 0.8 MG/DL (ref 0–1.2)
BUN SERPL-MCNC: 11 MG/DL (ref 6–20)
BUN/CREAT SERPL: 16 (ref 9–23)
CALCIUM SERPL-MCNC: 9 MG/DL (ref 8.7–10.2)
CHLORIDE SERPL-SCNC: 106 MMOL/L (ref 96–106)
CO2 SERPL-SCNC: 23 MMOL/L (ref 20–29)
CREAT SERPL-MCNC: 0.7 MG/DL (ref 0.57–1)
FT4I SERPL CALC-MCNC: 2 (ref 1.2–4.9)
GLOBULIN SER CALC-MCNC: 2.7 G/DL (ref 1.5–4.5)
GLUCOSE SERPL-MCNC: 75 MG/DL (ref 65–99)
POTASSIUM SERPL-SCNC: 4.5 MMOL/L (ref 3.5–5.2)
PROGEST SERPL-MCNC: 14.3 NG/ML
PROLACTIN SERPL-MCNC: 10.4 NG/ML (ref 4.8–23.3)
PROT SERPL-MCNC: 7.1 G/DL (ref 6–8.5)
SODIUM SERPL-SCNC: 140 MMOL/L (ref 134–144)
T3RU NFR SERPL: 28 % (ref 24–39)
T4 SERPL-MCNC: 7.1 UG/DL (ref 4.5–12)
TESTOST FREE SERPL-MCNC: 5.1 PG/ML (ref 0–4.2)
TESTOST SERPL-MCNC: 33 NG/DL (ref 8–48)
TSH SERPL DL<=0.005 MIU/L-ACNC: 1.44 UIU/ML (ref 0.45–4.5)

## 2019-02-05 ENCOUNTER — OFFICE VISIT (OUTPATIENT)
Dept: OBGYN CLINIC | Age: 34
End: 2019-02-05

## 2019-02-05 VITALS — DIASTOLIC BLOOD PRESSURE: 64 MMHG | WEIGHT: 179 LBS | SYSTOLIC BLOOD PRESSURE: 110 MMHG | BODY MASS INDEX: 26.43 KG/M2

## 2019-02-05 DIAGNOSIS — Z34.81 PRENATAL CARE, SUBSEQUENT PREGNANCY IN FIRST TRIMESTER: Primary | ICD-10-CM

## 2019-02-05 NOTE — PATIENT INSTRUCTIONS

## 2019-02-05 NOTE — PROGRESS NOTES
/Current pregnancy history:    Luan Keating is a  29 y.o. female [facility] Patient's last menstrual period was 2018 (approximate). .  She presents for the evaluation of amenorrhea and a positive pregnancy test.    LMP history:  The date of her LMP is unknown. Her last menstrual period was normal.  A urine pregnancy test was positive      Ultrasound data:  She had an  ultrasound done by the ultrasound tech today which revealed a viable johnson pregnancy with a gestational age of 10 weeks and 1 days giving an [address] of 2019. Pregnancy symptoms:    Since her LMP she has experienced  urinary frequency, breast tenderness, and nausea. She has not been vomiting over the last few weeks. Patient states she is having a lot of nausea but does not wish to take medication at this time. Associated signs and symptoms which she denies: dysuria, discharge, vaginal bleeding. Relevant past pregnancy history:   She has the following pregnancy history: Her last pregnancy was   uncomplicated. She has no history of  delivery. Relevant past medical history:(relevant to this pregnancy): noncontributory. Pap/Occupational history:  Last pap smear: last year Results: Normal            Substance history: negative for alcohol, tobacco and street drugs. Positive for nothing. Exposure history: There is/are no indoor cat/s in the home. The patient was instructed to not change the cat litter. She admits close contact with children on a regular basis. She has had chicken pox or the vaccine in the past.   Patient denies issues with domestic violence. Genetic Screening/Teratology Counseling: (Includes patient, baby's father, or anyone in either family with:)  3.  Patient's age >/= 28 at EDC?--no  2.   Thalassemia ([facility] [place], [address] [facility], or  background): MCV<80?--no.     3.  Neural tube defect (meningomyelocele, spina bifida, anencephaly)?--no.   4.  Congenital heart defect?--no.  5.  Down syndrome?--no.   6.  Wade-Sachs (Gnosticism, [place])?--no.   7.  Canavan's Disease?--no.   8.  Familial Dysautonomia?--no.   9.  Sickle cell disease or trait ()? --no   The patient has not been tested for sickle trait  10. Hemophilia or other blood disorders?--no. 11.  Muscular dystrophy?--no. 12.  Cystic fibrosis?--no. 13.  Kearney's Chorea?--no. 14.  Mental retardation/autism (if yes was person tested for Fragile X)?--no. 15.  Other inherited genetic or chromosomal disorder?--no. 12.  Maternal metabolic disorder (DM, PKU, etc)?--no. 17.  Patient or FOB with a child with a birth defect not listed above?--no.  17a. Patient or FOB with a birth defect themselves?--no. 18.  Recurrent pregnancy loss, or stillbirth?--no. 19.  Any medications since LMP other than prenatal vitamins (include vitamins, supplements, OTC meds, drugs, alcohol)?--no. 20.  Any other genetic/environmental exposure to discuss?--no. Infection History:  1. Lives with someone with TB or TB exposed?--no.   2.  Patient or partner has history of genital herpes?--no.  3.  Rash or viral illness since LMP?--no.    4.  History of STD (GC, CT, HPV, syphilis, HIV)? --no   5. Other: OTHER? Past Medical History:   Diagnosis Date    Abnormal Papanicolaou smear of cervix     colposcopy, dr. Gonzales Corpus Pap smear for cervical cancer screening 2017 neg HPV POS     History reviewed. No pertinent surgical history.   Social History     Occupational History    Not on file   Tobacco Use    Smoking status: Never Smoker    Smokeless tobacco: Never Used   Substance and Sexual Activity    Alcohol use: No     Frequency: Never     Comment: Social     Drug use: No    Sexual activity: Yes     Partners: Male     Birth control/protection: None     Family History   Problem Relation Age of Onset    No Known Problems Father     Elevated Lipids Mother      OB History    Para Term  AB Living 3 1 1   1 1   SAB TAB Ectopic Molar Multiple Live Births   1       0 1      # Outcome Date GA Lbr Akshat/2nd Weight Sex Delivery Anes PTL Lv   3 Current            2 Term 09/25/16 39w1d  6 lb 16 oz (3.175 kg) M Vag-Spont EPIDURAL AN N TREY   1 SAB                 No Known Allergies  Prior to Admission medications    Medication Sig Start Date End Date Taking? Authorizing Provider   prenatal multivit-ca-min-fe-fa tab Take  by mouth. Yes Provider, Historical   ciprofloxacin HCl (CIPRO) 500 mg tablet Take 1 Tab by mouth two (2) times a day. Do not take if pregnant 10/26/17   Brigido Figueroa MD        Review of Systems: History obtained from the patient  Constitutional: negative for weight loss, fever, night sweats  HEENT: negative for hearing loss, earache, congestion, snoring, sore throat  CV: negative for chest pain, palpitations, edema  Resp: negative for cough, shortness of breath, wheezing  Breast: negative for breast lumps, nipple discharge, galactorrhea  GI: negative for change in bowel habits, abdominal pain, black or bloody stools  : negative for frequency, dysuria, hematuria, vaginal discharge  MSK: negative for back pain, joint pain, muscle pain  Skin: negative for itching, rash, hives  Neuro: negative for dizziness, headache, confusion, weakness  Psych: negative for anxiety, depression, change in mood  Heme/lymph: negative for bleeding, bruising, pallor    Objective:  Visit Vitals  /64 (BP 1 Location: Left arm, BP Patient Position: Sitting)   Wt 179 lb (81.2 kg)   LMP 11/28/2018 (Approximate)   Breastfeeding?  No   BMI 26.43 kg/m²       Physical Exam:     Constitutional  · Appearance: well-nourished, well developed, alert, in no acute distress    HENT  · Head  · Face: appears normal  · Eyes: appear normal  · Ears: normal  · Mouth: normal  · Lips: no lesions      Chest  · Respiratory Effort: breathing unlabored     Cardiovascular  · Heart:  · Auscultation: regular rate and rhythm without murmur      Gastrointestinal  · Abdominal Examination: abdomen non-tender to palpation, normal bowel sounds, no masses present  · Liver and spleen: no hepatomegaly present, spleen not palpable  · Hernias: no hernias identified    Genitourinary  · deferred    Skin  · General Inspection: no rash, no lesions identified    Neurologic/Psychiatric  · Mental Status:  · Orientation: grossly oriented to person, place and time  · Mood and Affect: mood normal, affect appropriate    Assessment:   Intrauterine pregnancy with issues addressed in problem list  Plan:     Offered CF testing, CVS, Nuchal Translucency, MSAFP, amnio, and discussed NIPT  Course of pregnancy discussed including visit schedule, routine U/S, glucola testing, etc.  Avoid alcoholic beverages and illicit/recreational drugs use  Take prenatal vitamins or folic acid daily. Hospital and practice style discussed with coverage system. Discussed nutrition, toxoplasmosis precautions, sexual activity, exercise, need for influenza vaccine, environmental and work hazards, travel advice, screen for domestic violence, need for seat belts. Discussed seafood, unpasteurized dairy products, deli meat, artificial sweeteners, and caffeine. Discussed current prescription drug use. Given medication list.  Discussed the use of over the counter medications and chemicals. Route of delivery discussed, including risks, benefits     Handouts given to pt.

## 2019-02-26 ENCOUNTER — ROUTINE PRENATAL (OUTPATIENT)
Dept: OBGYN CLINIC | Age: 34
End: 2019-02-26

## 2019-02-26 VITALS
SYSTOLIC BLOOD PRESSURE: 122 MMHG | DIASTOLIC BLOOD PRESSURE: 76 MMHG | BODY MASS INDEX: 26.96 KG/M2 | HEIGHT: 69 IN | WEIGHT: 182 LBS

## 2019-02-26 DIAGNOSIS — Z34.81 PRENATAL CARE, SUBSEQUENT PREGNANCY IN FIRST TRIMESTER: ICD-10-CM

## 2019-02-26 LAB
ANTIBODY SCREEN, EXTERNAL: NEGATIVE
CHLAMYDIA, EXTERNAL: NEGATIVE
GRBS, EXTERNAL: POSITIVE
HBSAG, EXTERNAL: NEGATIVE
HCT, EXTERNAL: 37.7
HGB EVAL, EXTERNAL: NORMAL
HGB, EXTERNAL: 12.6
HIV, EXTERNAL: NORMAL
N. GONORRHEA, EXTERNAL: NEGATIVE
PLATELET CNT,   EXTERNAL: 352
RUBELLA, EXTERNAL: NORMAL
T. PALLIDUM, EXTERNAL: NEGATIVE
URINALYSIS, EXTERNAL: NORMAL
VARICELLA, EXTERNAL: NORMAL

## 2019-02-26 NOTE — PROGRESS NOTES
About a week ago noticed dark brown spotting when wiping. Some light pink spotting today with wiping.  H/o miscarriage  [address] seen on 1st trimester US  IOB labs today  Declines genetics  A bit more energy, Still some nausea, declines meds

## 2019-02-26 NOTE — PATIENT INSTRUCTIONS
Weeks 10 to 14 of Your Pregnancy: Care Instructions  Your Care Instructions    By weeks 10 to 14 of your pregnancy, the placenta has formed inside your uterus. It is possible to hear your baby's heartbeat with a special ultrasound device. Your baby's eyes can and do move. The arms and legs can bend. This is a good time to think about testing for birth defects. There are two types of tests: screening and diagnostic. Screening tests show the chance that a baby has a certain birth defect. They can't tell you for sure that your baby has a problem. Diagnostic tests show if a baby has a certain birth defect. It's your choice whether to have these tests. You and your partner can talk to your doctor or midwife about birth defects tests. Follow-up care is a key part of your treatment and safety. Be sure to make and go to all appointments, and call your doctor if you are having problems. It's also a good idea to know your test results and keep a list of the medicines you take. How can you care for yourself at home? Decide about tests  · You can have screening tests and diagnostic tests to check for birth defects. The decision to have a test for birth defects is personal. Think about your age, your chance of passing on a family disease, your need to know about any problems, and what you might do after you have the test results. ? Triple or quadruple (quad) blood tests. These screening tests can be done between 15 and 20 weeks of pregnancy. They check the amounts of three or four substances in your blood. The doctor looks at these test results, along with your age and other factors, to find out the chance that your baby may have certain problems. ? Amniocentesis. This diagnostic test is used to look for chromosomal problems in the baby's cells.  It can be done between 15 and 20 weeks of pregnancy, usually around week 16.  ? Nuchal translucency test. This test uses ultrasound to measure the thickness of the area at the back of the baby's neck. An increase in the thickness can be an early sign of Down syndrome. ? Chorionic villus sampling ([facility]). This is a test that looks for certain genetic problems with your baby. The same genes that are in your baby are in the placenta. A small piece of the placenta is taken out and tested. This test is done when you are 10 to 13 weeks pregnant. Ease discomfort  · Slow down and take naps when you feel tired. · If your emotions swing, talk to someone. Crying, anxiety, and concentration problems are common. · If your gums bleed, try a softer toothbrush. If your gums are puffy and bleed a lot, see your dentist.  · If you feel dizzy:  ? Get up slowly after sitting or lying down. ? Drink plenty of fluids. ? Eat small snacks to keep your blood sugar stable. ? Put your head between your legs as though you were tying your shoelaces. ? Lie down with your legs higher than your head. Use pillows to prop up your feet. · If you have a headache:  ? Lie down. ? Ask your partner or a good friend for a neck massage. ? Try cool cloths over your forehead or across the back of your neck. ? Use acetaminophen (Tylenol) for pain relief. Do not use nonsteroidal anti-inflammatory drugs (NSAIDs), such as ibuprofen (Advil, Motrin) or naproxen (Aleve), unless your doctor says it is okay. · If you have a nosebleed, pinch your nose gently, and hold it for a short while. To prevent nosebleeds, try massaging a small dab of petroleum jelly, such as Vaseline, in your nostrils. · If your nose is stuffed up, try saline (saltwater) nose sprays. Do not use decongestant sprays. Care for your breasts  · Wear a bra that gives you good support. · Know that changes in your breasts are normal.  ? Your breasts may get larger and more tender. Tenderness usually gets better by 12 weeks. ? Your nipples may get darker and larger, and small bumps around your nipples may show more. ?  The veins in your chest and breasts may show more. · Don't worry about \"toughening'\" your nipples. Breastfeeding will naturally do this. Where can you learn more? Go to http://piero-aurdey.info/. Enter V788 in the search box to learn more about \"Weeks 10 to 14 of Your Pregnancy: Care Instructions. \"  Current as of: September 5, 2018  Content Version: 11.9  © 2899-2493 [employer]. Care instructions adapted under license by [employer] (which disclaims liability or warranty for this information). If you have questions about a medical condition or this instruction, always ask your healthcare professional. [address] any warranty or liability for your use of this information.

## 2019-02-28 LAB
ABO GROUP BLD: NORMAL
BLD GP AB SCN SERPL QL: NEGATIVE
ERYTHROCYTE [DISTWIDTH] IN BLOOD BY AUTOMATED COUNT: 13.8 % (ref 12.3–15.4)
HBV SURFACE AG SERPL QL IA: NEGATIVE
HCT VFR BLD AUTO: 37.7 % (ref 34–46.6)
HGB A MFR BLD: 97.5 % (ref 96.4–98.8)
HGB A2 MFR BLD COLUMN CHROM: 2.5 % (ref 1.8–3.2)
HGB BLD-MCNC: 12.6 G/DL (ref 11.1–15.9)
HGB C MFR BLD: 0 %
HGB F MFR BLD: 0 % (ref 0–2)
HGB FRACT BLD-IMP: NORMAL
HGB OTHER MFR BLD HPLC: 0 %
HGB S BLD QL SOLY: NEGATIVE
HGB S MFR BLD: 0 %
HIV 1+2 AB+HIV1 P24 AG SERPL QL IA: NON REACTIVE
MCH RBC QN AUTO: 28.9 PG (ref 26.6–33)
MCHC RBC AUTO-ENTMCNC: 33.4 G/DL (ref 31.5–35.7)
MCV RBC AUTO: 87 FL (ref 79–97)
PLATELET # BLD AUTO: 352 X10E3/UL (ref 150–379)
RBC # BLD AUTO: 4.36 X10E6/UL (ref 3.77–5.28)
RH BLD: POSITIVE
RUBV IGG SERPL IA-ACNC: 2.08 INDEX
T PALLIDUM AB SER QL IA: NEGATIVE
VZV IGG SER IA-ACNC: 689 INDEX
WBC # BLD AUTO: 10.4 X10E3/UL (ref 3.4–10.8)

## 2019-03-01 DIAGNOSIS — Z34.81 PRENATAL CARE, SUBSEQUENT PREGNANCY IN FIRST TRIMESTER: ICD-10-CM

## 2019-03-01 LAB
BACTERIA UR CULT: ABNORMAL
BACTERIA UR CULT: ABNORMAL

## 2019-03-02 LAB
C TRACH RRNA SPEC QL NAA+PROBE: NEGATIVE
N GONORRHOEA RRNA SPEC QL NAA+PROBE: NEGATIVE
T VAGINALIS RRNA VAG QL NAA+PROBE: NEGATIVE

## 2019-03-04 DIAGNOSIS — Z34.81 PRENATAL CARE, SUBSEQUENT PREGNANCY IN FIRST TRIMESTER: ICD-10-CM

## 2019-03-04 RX ORDER — AMOXICILLIN 500 MG/1
500 CAPSULE ORAL 3 TIMES DAILY
Qty: 21 CAP | Refills: 0 | Status: SHIPPED | OUTPATIENT
Start: 2019-03-04 | End: 2019-03-11

## 2019-03-04 NOTE — PROGRESS NOTES
Good news, your Urine STD screen was negative. Your Urine Culture showed Group B Strep. The new guidelines indicate we treat this as a UTI (Urinary Tract Infection) and give you antibiotics now and we will treat you again in Labor and Delivery to keep your baby safe. I sent in a prescription to your pharmacy for the antibiotic. If you have any questions please feel free to call or message.  Thanks, Melvin Alvarenga

## 2019-03-29 ENCOUNTER — ROUTINE PRENATAL (OUTPATIENT)
Dept: OBGYN CLINIC | Age: 34
End: 2019-03-29

## 2019-03-29 VITALS
HEIGHT: 69 IN | BODY MASS INDEX: 27.4 KG/M2 | SYSTOLIC BLOOD PRESSURE: 126 MMHG | WEIGHT: 185 LBS | DIASTOLIC BLOOD PRESSURE: 60 MMHG

## 2019-03-29 DIAGNOSIS — Z34.82 PRENATAL CARE, SUBSEQUENT PREGNANCY IN SECOND TRIMESTER: Primary | ICD-10-CM

## 2019-03-29 DIAGNOSIS — Z34.81 PRENATAL CARE, SUBSEQUENT PREGNANCY IN FIRST TRIMESTER: ICD-10-CM

## 2019-03-29 NOTE — PATIENT INSTRUCTIONS

## 2019-04-18 ENCOUNTER — ROUTINE PRENATAL (OUTPATIENT)
Dept: OBGYN CLINIC | Age: 34
End: 2019-04-18

## 2019-04-18 VITALS
DIASTOLIC BLOOD PRESSURE: 72 MMHG | HEIGHT: 69 IN | BODY MASS INDEX: 27.99 KG/M2 | SYSTOLIC BLOOD PRESSURE: 126 MMHG | WEIGHT: 189 LBS

## 2019-04-18 DIAGNOSIS — Z34.81 PRENATAL CARE, SUBSEQUENT PREGNANCY IN FIRST TRIMESTER: ICD-10-CM

## 2019-04-18 DIAGNOSIS — Z34.82 PRENATAL CARE, SUBSEQUENT PREGNANCY IN SECOND TRIMESTER: Primary | ICD-10-CM

## 2019-04-18 RX ORDER — DIPHENHYDRAMINE HCL 25 MG
25 CAPSULE ORAL
COMMUNITY
End: 2019-06-24

## 2019-04-18 NOTE — PROGRESS NOTES
Doing well, no concerns. Glucola next visit. A SINGLE VIABLE IUP AT 19W3D IS SEEN. FETAL CARDIAC MOTION OBSERVED. FETAL ANATOMY WAS WELL VISUALIZED AND APPEARS WNL. NO ABNORMALITIES WERE SEEN ON TODAYS EXAM.  APPROPRIATE GROWTH MEASURED. SIZE = DATES. WING, PLACENTA AND CERVIX APPEAR WITHIN NORMAL LIMITS.   GENDER: MALE

## 2019-04-18 NOTE — PATIENT INSTRUCTIONS

## 2019-04-27 ENCOUNTER — HOSPITAL ENCOUNTER (EMERGENCY)
Age: 34
Discharge: HOME | End: 2019-04-27
Attending: EMERGENCY MEDICINE
Payer: COMMERCIAL

## 2019-04-27 VITALS
BODY MASS INDEX: 27.91 KG/M2 | RESPIRATION RATE: 17 BRPM | HEART RATE: 69 BPM | OXYGEN SATURATION: 97 % | SYSTOLIC BLOOD PRESSURE: 107 MMHG | HEIGHT: 69 IN | DIASTOLIC BLOOD PRESSURE: 68 MMHG | TEMPERATURE: 98.3 F

## 2019-04-27 DIAGNOSIS — I49.8 SINUS ARRHYTHMIA: Primary | ICD-10-CM

## 2019-04-27 LAB
ALBUMIN SERPL-MCNC: 3.1 G/DL (ref 3.5–5)
ALBUMIN/GLOB SERPL: 0.7 {RATIO} (ref 1.1–2.2)
ALP SERPL-CCNC: 42 U/L (ref 45–117)
ALT SERPL-CCNC: 15 U/L (ref 12–78)
ANION GAP SERPL CALC-SCNC: 8 MMOL/L (ref 5–15)
AST SERPL-CCNC: 11 U/L (ref 15–37)
BASOPHILS # BLD: 0 K/UL (ref 0–0.1)
BASOPHILS NFR BLD: 0 % (ref 0–1)
BILIRUB SERPL-MCNC: 0.4 MG/DL (ref 0.2–1)
BUN SERPL-MCNC: 6 MG/DL (ref 6–20)
BUN/CREAT SERPL: 10 (ref 12–20)
CALCIUM SERPL-MCNC: 8.9 MG/DL (ref 8.5–10.1)
CHLORIDE SERPL-SCNC: 108 MMOL/L (ref 97–108)
CK SERPL-CCNC: 71 U/L (ref 26–192)
CO2 SERPL-SCNC: 24 MMOL/L (ref 21–32)
COMMENT, HOLDF: NORMAL
CREAT SERPL-MCNC: 0.58 MG/DL (ref 0.55–1.02)
DIFFERENTIAL METHOD BLD: ABNORMAL
EOSINOPHIL # BLD: 0.1 K/UL (ref 0–0.4)
EOSINOPHIL NFR BLD: 0 % (ref 0–7)
ERYTHROCYTE [DISTWIDTH] IN BLOOD BY AUTOMATED COUNT: 13.2 % (ref 11.5–14.5)
GLOBULIN SER CALC-MCNC: 4.4 G/DL (ref 2–4)
GLUCOSE SERPL-MCNC: 109 MG/DL (ref 65–100)
HCT VFR BLD AUTO: 37.6 % (ref 35–47)
HGB BLD-MCNC: 12.4 G/DL (ref 11.5–16)
IMM GRANULOCYTES # BLD AUTO: 0.1 K/UL (ref 0–0.04)
IMM GRANULOCYTES NFR BLD AUTO: 1 % (ref 0–0.5)
LYMPHOCYTES # BLD: 2.7 K/UL (ref 0.8–3.5)
LYMPHOCYTES NFR BLD: 19 % (ref 12–49)
MAGNESIUM SERPL-MCNC: 2.1 MG/DL (ref 1.6–2.4)
MCH RBC QN AUTO: 29.1 PG (ref 26–34)
MCHC RBC AUTO-ENTMCNC: 33 G/DL (ref 30–36.5)
MCV RBC AUTO: 88.3 FL (ref 80–99)
MONOCYTES # BLD: 0.6 K/UL (ref 0–1)
MONOCYTES NFR BLD: 4 % (ref 5–13)
NEUTS SEG # BLD: 10.8 K/UL (ref 1.8–8)
NEUTS SEG NFR BLD: 76 % (ref 32–75)
NRBC # BLD: 0 K/UL (ref 0–0.01)
NRBC BLD-RTO: 0 PER 100 WBC
PLATELET # BLD AUTO: 348 K/UL (ref 150–400)
PMV BLD AUTO: 9.4 FL (ref 8.9–12.9)
POTASSIUM SERPL-SCNC: 3.5 MMOL/L (ref 3.5–5.1)
PROT SERPL-MCNC: 7.5 G/DL (ref 6.4–8.2)
RBC # BLD AUTO: 4.26 M/UL (ref 3.8–5.2)
SAMPLES BEING HELD,HOLD: NORMAL
SODIUM SERPL-SCNC: 140 MMOL/L (ref 136–145)
TROPONIN I SERPL-MCNC: <0.05 NG/ML
TSH SERPL DL<=0.05 MIU/L-ACNC: 1.25 UIU/ML (ref 0.36–3.74)
WBC # BLD AUTO: 14.3 K/UL (ref 3.6–11)

## 2019-04-27 PROCEDURE — 85025 COMPLETE CBC W/AUTO DIFF WBC: CPT

## 2019-04-27 PROCEDURE — 84443 ASSAY THYROID STIM HORMONE: CPT

## 2019-04-27 PROCEDURE — 80053 COMPREHEN METABOLIC PANEL: CPT

## 2019-04-27 PROCEDURE — 99284 EMERGENCY DEPT VISIT MOD MDM: CPT

## 2019-04-27 PROCEDURE — 83735 ASSAY OF MAGNESIUM: CPT

## 2019-04-27 PROCEDURE — 84484 ASSAY OF TROPONIN QUANT: CPT

## 2019-04-27 PROCEDURE — 93005 ELECTROCARDIOGRAM TRACING: CPT

## 2019-04-27 PROCEDURE — 82550 ASSAY OF CK (CPK): CPT

## 2019-04-27 PROCEDURE — 36415 COLL VENOUS BLD VENIPUNCTURE: CPT

## 2019-04-27 NOTE — DISCHARGE INSTRUCTIONS
Patient Education        Premature Heartbeat: Care Instructions  Your Care Instructions    A premature heartbeat happens when the heart beats earlier than it should. This briefly interrupts the heart's rhythm. You do not usually feel the early heartbeat, and the next beat is stronger. To many people, this feels like a skipped heartbeat or a flutter. This heartbeat is also called a premature ventricular contraction (PVC). If you have no heart problems, premature heartbeats that happen once in a while are not a cause for concern. Most people have them at some time. They may happen more often if you drink a lot of caffeine or alcohol or are under stress. Usually, no cause for a premature heartbeat is found, and no treatment is needed. Some people may take medicine to prevent these heartbeats and to relieve symptoms. Follow-up care is a key part of your treatment and safety. Be sure to make and go to all appointments, and call your doctor if you are having problems. It's also a good idea to know your test results and keep a list of the medicines you take. How can you care for yourself at home? · Limit caffeine and other stimulants if they trigger premature heartbeats. · Reduce stress. Avoid people and places that make you feel anxious, if you can. Learn ways to reduce stress, such as biofeedback, guided imagery, and meditation. · Do not smoke or allow others to smoke around you. If you need help quitting, talk to your doctor about stop-smoking programs and medicines. These can increase your chances of quitting for good. · Get at least 30 minutes of exercise on most days of the week. Walking is a good choice. You also may want to do other activities, such as running, swimming, cycling, or playing tennis or team sports. · Eat heart-healthy foods. · Stay at a healthy weight. Lose weight if you need to. · Get enough sleep. Keep your room dark and quiet, and try to go to bed at the same time every night.   · Limit alcohol to 2 drinks a day for men and 1 drink a day for women. Too much alcohol can cause health problems. If drinking alcohol causes more premature heartbeats, do not drink it. · If your doctor prescribes medicine, take it exactly as prescribed. Call your doctor if you think you are having a problem with your medicine. When should you call for help? Call 911 anytime you think you may need emergency care. For example, call if:    · You passed out (lost consciousness).    Call your doctor now or seek immediate medical care if:    · You are dizzy or lightheaded, or you feel like you may faint.     · You are short of breath.    Watch closely for changes in your health, and be sure to contact your doctor if you have any problems. Where can you learn more? Go to http://piero-audrey.info/. Enter Y687 in the search box to learn more about \"Premature Heartbeat: Care Instructions. \"  Current as of: July 22, 2018  Content Version: 11.9  © 2386-4640 [employer]. Care instructions adapted under license by [employer] (which disclaims liability or warranty for this information). If you have questions about a medical condition or this instruction, always ask your healthcare professional. [address] any warranty or liability for your use of this information.

## 2019-04-27 NOTE — ED PROVIDER NOTES
29 y.o. 21 weeks pregnant female with no significant past medical history who presents from home with chief complaint of palpitations. Pt reports she has felt an \"unusual\" sensation in her chest \"like skipped beats\" since yesterday afternoon, noting she has recently been under significant stress. Pt states she was evaluated by her PCP ~1 year ago d/t similar sx, but she had a normal EKG at that time; her sx were attributed to stress. She takes a probiotic, a prenatal vitamin, and a cranberry pill daily, but she denies regular medication. NKDA. Pt denies recent long travel. Pt denies chest pain and SOB. There are no other acute medical concerns at this time. Social hx: denies smoking and EtOH use PCP: Mario Coley MD 
 
Note written by Gabbie Desai, as dictated by Tg Thomason MD 2:50 PM 
 
 
  
 
Past Medical History:  
Diagnosis Date  Abnormal Papanicolaou smear of cervix   
 colposcopy, dr. Aleena Clifford  Pap smear for cervical cancer screening 05/03/2017 neg HPV POS No past surgical history on file. Family History:  
Problem Relation Age of Onset  No Known Problems Father  Elevated Lipids Mother Social History Socioeconomic History  Marital status:  Spouse name: Not on file  Number of children: Not on file  Years of education: Not on file  Highest education level: Not on file Occupational History  Not on file Social Needs  Financial resource strain: Not on file  Food insecurity:  
  Worry: Not on file Inability: Not on file  Transportation needs:  
  Medical: Not on file Non-medical: Not on file Tobacco Use  Smoking status: Never Smoker  Smokeless tobacco: Never Used Substance and Sexual Activity  Alcohol use: No  
  Frequency: Never Comment: Social   
 Drug use: No  
 Sexual activity: Yes  
  Partners: Male Birth control/protection: None Lifestyle  Physical activity: Days per week: Not on file Minutes per session: Not on file  Stress: Not on file Relationships  Social connections:  
  Talks on phone: Not on file Gets together: Not on file Attends Congregation service: Not on file Active member of club or organization: Not on file Attends meetings of clubs or organizations: Not on file Relationship status: Not on file  Intimate partner violence:  
  Fear of current or ex partner: Not on file Emotionally abused: Not on file Physically abused: Not on file Forced sexual activity: Not on file Other Topics Concern  Not on file Social History Narrative Full time  for private school Manageable stress   healthy Son Zenon Guallpa -day care will go to [employer] at [facility] Pt was home part time for 11 months ALLERGIES: Patient has no known allergies. Review of Systems Constitutional: Negative for activity change, chills and fever. HENT: Negative for nosebleeds, sore throat, trouble swallowing and voice change. Eyes: Negative for visual disturbance. Respiratory: Negative for shortness of breath. Cardiovascular: Positive for palpitations. Negative for chest pain. Gastrointestinal: Negative for abdominal pain, constipation, diarrhea and nausea. Genitourinary: Negative for difficulty urinating, dysuria, hematuria and urgency. Musculoskeletal: Negative for back pain, neck pain and neck stiffness. Skin: Negative for color change. Allergic/Immunologic: Negative for immunocompromised state. Neurological: Negative for dizziness, seizures, syncope, weakness, light-headedness, numbness and headaches. Psychiatric/Behavioral: Negative for behavioral problems, confusion, hallucinations, self-injury and suicidal ideas. All other systems reviewed and are negative. Vitals:  
 04/27/19 1354 Pulse: 92 SpO2: 100% Physical Exam  
 Constitutional: She is oriented to person, place, and time. She appears well-developed and well-nourished. No distress. HENT:  
Head: Normocephalic and atraumatic. Eyes: Pupils are equal, round, and reactive to light. Neck: Normal range of motion. Neck supple. Cardiovascular: Normal rate and normal heart sounds. An irregular rhythm present. Exam reveals no gallop and no friction rub. No murmur heard. Pulmonary/Chest: Effort normal and breath sounds normal. No respiratory distress. She has no wheezes. Abdominal: Soft. Bowel sounds are normal. She exhibits no distension. There is no tenderness. There is no rebound and no guarding. Musculoskeletal: Normal range of motion. Neurological: She is alert and oriented to person, place, and time. Skin: Skin is warm. No rash noted. She is not diaphoretic. Psychiatric: She has a normal mood and affect. Her behavior is normal. Judgment and thought content normal.  
Nursing note and vitals reviewed. Note written by Gabbie Paz, as dictated by Lakhwinder Coelho MD 2:50 PM 
 
MDM This is a 80-year-old female with past medical history, review of systems, physical exam as above, presenting with complaints of palpitations, in the setting of previous episodes of the same, currently noted to be 21 weeks pregnant. Patient states the palpitations have developed over the last several days, noted that today \"I just didn't feel right\". She denies chest pain, denies shortness of breath, states the previous cardiac workups have failed to reveal serious cardiac etiology. Physical exam is remarkable for well-appearing young female, in no acute distress with clear breath sounds, soft abdomen, regular rate, irregular rhythm without murmurs gallops or rubs. Differential includes electrolyte abnormality, thyroid disease. We will obtain CMP, CBC, EKG, magnesium, TSH.   We will reevaluate, and make a disposition based on the patient's diagnostics and response to therapy. Procedures PROGRESS NOTE: 
4:05 PM 
Labs are unremarkable. EKG shows sinus arrhythmia. Sera Kelly MD plans to discharge Pt w/ follow up w/ OB and cardiology as needed.

## 2019-04-27 NOTE — ED TRIAGE NOTES
Pt arrives ambulatory to ED from home with c/o palpitations. \"I'll be 21 weeks pregnant on Monday. I called Dr. Dana Grace and she told me to be seen in the ED\". Denies CP or SOB.

## 2019-04-28 LAB
ATRIAL RATE: 84 BPM
CALCULATED P AXIS, ECG09: 38 DEGREES
CALCULATED R AXIS, ECG10: 81 DEGREES
CALCULATED T AXIS, ECG11: -15 DEGREES
DIAGNOSIS, 93000: NORMAL
P-R INTERVAL, ECG05: 114 MS
Q-T INTERVAL, ECG07: 374 MS
QRS DURATION, ECG06: 80 MS
QTC CALCULATION (BEZET), ECG08: 441 MS
VENTRICULAR RATE, ECG03: 84 BPM

## 2019-05-14 ENCOUNTER — ROUTINE PRENATAL (OUTPATIENT)
Dept: OBGYN CLINIC | Age: 34
End: 2019-05-14

## 2019-05-14 VITALS
DIASTOLIC BLOOD PRESSURE: 66 MMHG | BODY MASS INDEX: 28.44 KG/M2 | HEIGHT: 69 IN | WEIGHT: 192 LBS | SYSTOLIC BLOOD PRESSURE: 124 MMHG

## 2019-05-14 DIAGNOSIS — Z34.82 PRENATAL CARE, SUBSEQUENT PREGNANCY IN SECOND TRIMESTER: Primary | ICD-10-CM

## 2019-05-14 LAB
ANTIBODY SCREEN, EXTERNAL: NEGATIVE
GTT, 1 HR, GLUCOLA, EXTERNAL: 77
HCT, EXTERNAL: 34
HGB, EXTERNAL: 11.6
PLATELET CNT,   EXTERNAL: 306
T. PALLIDUM, EXTERNAL: NEGATIVE

## 2019-05-14 NOTE — PATIENT INSTRUCTIONS
Weeks 22 to 26 of Your Pregnancy: Care Instructions  Your Care Instructions    As you enter your 7th month of pregnancy at week 26, your baby's lungs are growing stronger and getting ready to breathe. You may notice that your baby responds to the sound of your or your partner's voice. You may also notice that your baby does less turning and twisting and more squirming or jerking. Jerking often means that your baby has the hiccups. Hiccups are perfectly normal and are only temporary. You may want to think about attending a childbirth preparation class. This is also a good time to start thinking about whether you want to have pain medicine during labor. Most pregnant women are tested for gestational diabetes between weeks 25 and 28. Gestational diabetes occurs when your blood sugar level gets too high when you're pregnant. The test is important, because you can have gestational diabetes and not know it. But the condition can cause problems for your baby. Follow-up care is a key part of your treatment and safety. Be sure to make and go to all appointments, and call your doctor if you are having problems. It's also a good idea to know your test results and keep a list of the medicines you take. How can you care for yourself at home? Ease discomfort from your baby's kicking  · Change your position. Sometimes this will cause your baby to change position too. · Take a deep breath while you raise your arm over your head. Then breathe out while you drop your arm. Do Kegel exercises to prevent urine from leaking  · You can do Kegel exercises while you stand or sit. ? Squeeze the same muscles you would use to stop your urine. Your belly and thighs should not move. ? Hold the squeeze for 3 seconds, and then relax for 3 seconds. ? Start with 3 seconds. Then add 1 second each week until you are able to squeeze for 10 seconds. ? Repeat the exercise 10 to 15 times for each session.  Do three or more sessions each day.  Ease or reduce swelling in your feet, ankles, hands, and fingers  · If your fingers are puffy, take off your rings. · Do not eat high-salt foods, such as potato chips. · Prop up your feet on a stool or couch as much as possible. Sleep with pillows under your feet. · Do not stand for long periods of time or wear tight shoes. · Wear support stockings. Where can you learn more? Go to http://piero-audrey.info/. Enter G264 in the search box to learn more about \"Weeks 22 to 26 of Your Pregnancy: Care Instructions. \"  Current as of: September 5, 2018  Content Version: 11.9  © 5442-9303 [employer] Care instructions adapted under license by [email or web address] (which disclaims liability or warranty for this information). If you have questions about a medical condition or this instruction, always ask your healthcare professional. [place] [address] any warranty or liability for your use of this information.

## 2019-05-16 LAB
BASOPHILS # BLD AUTO: 0 X10E3/UL (ref 0–0.2)
BASOPHILS NFR BLD AUTO: 0 %
BLD GP AB SCN SERPL QL: NEGATIVE
EOSINOPHIL # BLD AUTO: 0.1 X10E3/UL (ref 0–0.4)
EOSINOPHIL NFR BLD AUTO: 1 %
ERYTHROCYTE [DISTWIDTH] IN BLOOD BY AUTOMATED COUNT: 14.1 % (ref 12.3–15.4)
GLUCOSE 1H P 50 G GLC PO SERPL-MCNC: 77 MG/DL (ref 65–139)
HCT VFR BLD AUTO: 34 % (ref 34–46.6)
HGB BLD-MCNC: 11.6 G/DL (ref 11.1–15.9)
IMM GRANULOCYTES # BLD AUTO: 0 X10E3/UL (ref 0–0.1)
IMM GRANULOCYTES NFR BLD AUTO: 0 %
LYMPHOCYTES # BLD AUTO: 2.7 X10E3/UL (ref 0.7–3.1)
LYMPHOCYTES NFR BLD AUTO: 25 %
MCH RBC QN AUTO: 29.4 PG (ref 26.6–33)
MCHC RBC AUTO-ENTMCNC: 34.1 G/DL (ref 31.5–35.7)
MCV RBC AUTO: 86 FL (ref 79–97)
MONOCYTES # BLD AUTO: 0.8 X10E3/UL (ref 0.1–0.9)
MONOCYTES NFR BLD AUTO: 7 %
NEUTROPHILS # BLD AUTO: 7.2 X10E3/UL (ref 1.4–7)
NEUTROPHILS NFR BLD AUTO: 67 %
PLATELET # BLD AUTO: 306 X10E3/UL (ref 150–379)
RBC # BLD AUTO: 3.95 X10E6/UL (ref 3.77–5.28)
T PALLIDUM AB SER QL IA: NEGATIVE
WBC # BLD AUTO: 10.6 X10E3/UL (ref 3.4–10.8)

## 2019-05-17 DIAGNOSIS — Z34.81 PRENATAL CARE, SUBSEQUENT PREGNANCY IN FIRST TRIMESTER: ICD-10-CM

## 2019-06-10 ENCOUNTER — ROUTINE PRENATAL (OUTPATIENT)
Dept: OBGYN CLINIC | Age: 34
End: 2019-06-10

## 2019-06-10 VITALS
HEIGHT: 69 IN | SYSTOLIC BLOOD PRESSURE: 126 MMHG | DIASTOLIC BLOOD PRESSURE: 70 MMHG | BODY MASS INDEX: 29.98 KG/M2 | WEIGHT: 202.4 LBS

## 2019-06-10 DIAGNOSIS — Z3A.27 27 WEEKS GESTATION OF PREGNANCY: Primary | ICD-10-CM

## 2019-06-10 NOTE — PATIENT INSTRUCTIONS

## 2019-06-24 ENCOUNTER — ROUTINE PRENATAL (OUTPATIENT)
Dept: OBGYN CLINIC | Age: 34
End: 2019-06-24

## 2019-06-24 VITALS
WEIGHT: 203.4 LBS | BODY MASS INDEX: 30.13 KG/M2 | DIASTOLIC BLOOD PRESSURE: 82 MMHG | HEIGHT: 69 IN | SYSTOLIC BLOOD PRESSURE: 128 MMHG

## 2019-06-24 DIAGNOSIS — Z34.81 PRENATAL CARE, SUBSEQUENT PREGNANCY IN FIRST TRIMESTER: ICD-10-CM

## 2019-06-24 DIAGNOSIS — Z3A.29 29 WEEKS GESTATION OF PREGNANCY: Primary | ICD-10-CM

## 2019-06-24 DIAGNOSIS — Z23 ENCOUNTER FOR IMMUNIZATION: ICD-10-CM

## 2019-06-24 NOTE — PROGRESS NOTES
After obtaining consent, and per orders of Dr. Kwaku Sutton, injection of Tdap given by Thea Centeno RN. Patient instructed to remain in clinic for 20 minutes afterwards, and to report any adverse reaction to me immediately.     Tdap  : Sara Farrell  Site: L deltoid  Route: Intramuscular  Dose: 0.5mL  Lot#: [id number]  Exp date: 9/4/21

## 2019-07-09 ENCOUNTER — ROUTINE PRENATAL (OUTPATIENT)
Dept: OBGYN CLINIC | Age: 34
End: 2019-07-09

## 2019-07-09 VITALS
BODY MASS INDEX: 30.63 KG/M2 | HEIGHT: 69 IN | SYSTOLIC BLOOD PRESSURE: 126 MMHG | DIASTOLIC BLOOD PRESSURE: 80 MMHG | WEIGHT: 206.8 LBS

## 2019-07-09 DIAGNOSIS — R00.2 HEART PALPITATIONS: ICD-10-CM

## 2019-07-09 DIAGNOSIS — I49.8 SINUS ARRHYTHMIA: ICD-10-CM

## 2019-07-09 DIAGNOSIS — Z34.03 ENCOUNTER FOR SUPERVISION OF NORMAL FIRST PREGNANCY IN THIRD TRIMESTER: Primary | ICD-10-CM

## 2019-07-09 NOTE — PROGRESS NOTES
Increasing heart palpitations  Went to ER 04/2019-- EKG showed sinus arrhythmia  Has never seen Cardiology- consult/referral to rule out - most likely functional pregnancy changes- pt comfortable with plan

## 2019-07-09 NOTE — PATIENT INSTRUCTIONS

## 2019-07-18 ENCOUNTER — OFFICE VISIT (OUTPATIENT)
Dept: CARDIOLOGY CLINIC | Age: 34
End: 2019-07-18

## 2019-07-18 VITALS
HEART RATE: 80 BPM | BODY MASS INDEX: 30.93 KG/M2 | HEIGHT: 69 IN | DIASTOLIC BLOOD PRESSURE: 76 MMHG | WEIGHT: 208.8 LBS | RESPIRATION RATE: 16 BRPM | SYSTOLIC BLOOD PRESSURE: 106 MMHG | OXYGEN SATURATION: 97 %

## 2019-07-18 DIAGNOSIS — R00.2 PALPITATIONS: Primary | ICD-10-CM

## 2019-07-18 DIAGNOSIS — I10 ESSENTIAL HYPERTENSION: ICD-10-CM

## 2019-07-18 NOTE — PROGRESS NOTES
Room # 10    Pregnant    [facility] ER- April for palpitations    Palpitations, chest heaviness, heart pounding feels heavier than normal    Visit Vitals  /76 (BP 1 Location: Left arm, BP Patient Position: Sitting)   Pulse 80   Resp 16   Ht 5' 9\" (1.753 m)   Wt 208 lb 12.8 oz (94.7 kg)   LMP 11/28/2018 (Approximate)   SpO2 97%   BMI 30.83 kg/m²

## 2019-07-18 NOTE — PROGRESS NOTES
Reason for Consult: Palpitations, Chest pressure. HPI: Cristian Alva is a 29 y.o. female here for evaluation of palpitations and chest pressure. She is 32 weeks pregnant. This is her second pregnancy. Symptoms started few weeks ago particularly in April when she had significant stress at work. Symptoms described as having flip-flop sensation or heart racing or significantly felt heartbeats in the chest.  Symptoms are random, spontaneous, short-lived, sometimes associated with chest pressure feeling. Symptoms were more significant when she had significant stress compared to when stress is lower. Does not drink any energy drinks or he does not indulge into significant caffeine or caffeinated beverages. No prior cardiac history. Previous pregnancy about 3 years ago did not have any significant problems. EKG from April 27, 2019 in the ER visit demonstrated sinus rhythm with PACs with nonspecific ST changes. The EKG was personally reviewed. EKG in my office today demonstrated normal sinus rhythm without any significant ectopies. Nonspecific ST changes are present which are unchanged from the previous EKG. No significant medications other than probiotics and prenatal.    Plan:    1. Palpitations: Symptoms correlate with ectopic beats. EKG from April 2019 in the ER visit demonstrated PACs which were occasional.  Likely had significant PACs at the time when she had significant stress. Could be hormonal as well due to pregnancy. 24-hour Holter monitor is recommended. Echocardiogram for structural evaluation of the heart. Currently no medications are indicated. Avoid stressful situations, proper sleep, hydration, avoid caffeinated beverages. 2.  Chest pressure: Symptoms correlate with the symptoms of palpitations. EKG demonstrated nonspecific ST changes. We will check an echocardiogram for wall motion.         Past Medical History:   Diagnosis Date    Abnormal Papanicolaou smear of cervix     colposcopy, dr. Longoria Kandi Pap smear for cervical cancer screening 05/03/2017 neg HPV POS          History reviewed. No pertinent surgical history.           Family History   Problem Relation Age of Onset    No Known Problems Father     Elevated Lipids Mother            Social History     Socioeconomic History    Marital status:      Spouse name: Not on file    Number of children: Not on file    Years of education: Not on file    Highest education level: Not on file   Occupational History    Not on file   Social Needs    Financial resource strain: Not on file    Food insecurity:     Worry: Not on file     Inability: Not on file    Transportation needs:     Medical: Not on file     Non-medical: Not on file   Tobacco Use    Smoking status: Never Smoker    Smokeless tobacco: Never Used   Substance and Sexual Activity    Alcohol use: No     Frequency: Never    Drug use: No    Sexual activity: Yes     Partners: Male     Birth control/protection: None   Lifestyle    Physical activity:     Days per week: Not on file     Minutes per session: Not on file    Stress: Not on file   Relationships    Social connections:     Talks on phone: Not on file     Gets together: Not on file     Attends Nondenominational service: Not on file     Active member of club or organization: Not on file     Attends meetings of clubs or organizations: Not on file     Relationship status: Not on file    Intimate partner violence:     Fear of current or ex partner: Not on file     Emotionally abused: Not on file     Physically abused: Not on file     Forced sexual activity: Not on file   Other Topics Concern    Not on file   Social History Narrative    Full time  for private school    Manageable stress              healthy        Son 2yo -day care will go to [employer] [facility]     Pt was home part time for 11 months         No Known Allergies         Current Outpatient Medications   Medication Sig Dispense Refill    Lactobacillus acidophilus (PROBIOTIC PO) Take  by mouth.  prenatal multivit-ca-min-fe-fa tab Take  by mouth. ROS:  12 point review of systems was performed.  All negative except for HPI     Physical Exam:  Visit Vitals  /76 (BP 1 Location: Left arm, BP Patient Position: Sitting)   Pulse 80   Resp 16   Ht 5' 9\" (1.753 m)   Wt 208 lb 12.8 oz (94.7 kg)   LMP 11/28/2018 (Approximate)   SpO2 97%   BMI 30.83 kg/m²       Gen:  Well-developed, well-nourished, in no acute distress  HEENT:  Pink conjunctivae, PERRL, hearing intact to voice, moist mucous membranes  Neck:  Supple, without masses, thyroid non-tender  Resp:  No accessory muscle use, clear breath sounds without wheezes rales or rhonchi  Card:  No murmurs, normal S1, S2 without thrills, bruits or peripheral edema  Abd:  Soft, non-tender, non-distended, normoactive bowel sounds are present, no palpable organomegaly and no detectable hernias  Lymph:  No cervical or inguinal adenopathy  Musc:  No cyanosis or clubbing  Skin:  No rashes or ulcers, skin turgor is good  Neuro:  Cranial nerves are grossly intact, no focal motor weakness, follows commands appropriately  Psych:  Good insight, oriented to person, place and time, alert     Labs:     Lab Results   Component Value Date/Time    WBC 10.6 05/14/2019 03:57 PM    HGB 11.6 05/14/2019 03:57 PM    HCT 34.0 05/14/2019 03:57 PM    PLATELET 724 72/78/7653 03:57 PM    MCV 86 05/14/2019 03:57 PM    Hgb, External 11.6 05/14/2019    Hct, External 34.0 05/14/2019    Platelet cnt., External 306 05/14/2019     Lab Results   Component Value Date/Time    Glucose 109 (H) 04/27/2019 02:18 PM    LDL, calculated 141 (H) 10/26/2017 12:33 PM    Creatinine 0.58 04/27/2019 02:18 PM      Lab Results   Component Value Date/Time    Cholesterol, total 213 (H) 10/26/2017 12:33 PM    HDL Cholesterol 58 10/26/2017 12:33 PM    LDL, calculated 141 (H) 10/26/2017 12:33 PM Triglyceride 72 10/26/2017 12:33 PM     Lab Results   Component Value Date/Time    ALT (SGPT) 15 04/27/2019 02:18 PM    AST (SGOT) 11 (L) 04/27/2019 02:18 PM    Alk.  phosphatase 42 (L) 04/27/2019 02:18 PM    Bilirubin, total 0.4 04/27/2019 02:18 PM    Albumin 3.1 (L) 04/27/2019 02:18 PM    Protein, total 7.5 04/27/2019 02:18 PM    PLATELET 635 76/26/9927 03:57 PM    Platelet cnt., External 306 05/14/2019     No results found for: INR, PTMR, PTP, PT1, PT2   Lab Results   Component Value Date/Time    GFR est non-AA >60 04/27/2019 02:18 PM    GFR est AA >60 04/27/2019 02:18 PM    Creatinine 0.58 04/27/2019 02:18 PM    BUN 6 04/27/2019 02:18 PM    Sodium 140 04/27/2019 02:18 PM    Potassium 3.5 04/27/2019 02:18 PM    Chloride 108 04/27/2019 02:18 PM    CO2 24 04/27/2019 02:18 PM    Magnesium 2.1 04/27/2019 02:23 PM     No results found for: PSA, Keena Combs, [id number], [id number], PSALT  Lab Results   Component Value Date/Time    TSH 1.25 04/27/2019 02:23 PM    T3 Uptake 28 11/21/2018 01:53 PM    T4, Total 7.1 11/21/2018 01:53 PM      Lab Results   Component Value Date/Time    Glucose 109 (H) 04/27/2019 02:18 PM      Lab Results   Component Value Date/Time    Troponin-I, Qt. <0.05 04/27/2019 02:18 PM      No results found for: BNP, BNPP, BNPPPOC, XBNPT, BNPNT   Lab Results   Component Value Date/Time    Sodium 140 04/27/2019 02:18 PM    Potassium 3.5 04/27/2019 02:18 PM    Chloride 108 04/27/2019 02:18 PM    CO2 24 04/27/2019 02:18 PM    Anion gap 8 04/27/2019 02:18 PM    Glucose 109 (H) 04/27/2019 02:18 PM    BUN 6 04/27/2019 02:18 PM    Creatinine 0.58 04/27/2019 02:18 PM    BUN/Creatinine ratio 10 (L) 04/27/2019 02:18 PM    GFR est AA >60 04/27/2019 02:18 PM    GFR est non-AA >60 04/27/2019 02:18 PM    Calcium 8.9 04/27/2019 02:18 PM      Lab Results   Component Value Date/Time    Sodium 140 04/27/2019 02:18 PM    Potassium 3.5 04/27/2019 02:18 PM    Chloride 108 04/27/2019 02:18 PM    CO2 24 2019 02:18 PM    Anion gap 8 2019 02:18 PM    Glucose 109 (H) 2019 02:18 PM    BUN 6 2019 02:18 PM    Creatinine 0.58 2019 02:18 PM    BUN/Creatinine ratio 10 (L) 2019 02:18 PM    GFR est AA >60 2019 02:18 PM    GFR est non-AA >60 2019 02:18 PM    Calcium 8.9 2019 02:18 PM    Bilirubin, total 0.4 2019 02:18 PM    ALT (SGPT) 15 2019 02:18 PM    AST (SGOT) 11 (L) 2019 02:18 PM    Alk. phosphatase 42 (L) 2019 02:18 PM    Protein, total 7.5 2019 02:18 PM    Albumin 3.1 (L) 2019 02:18 PM    Globulin 4.4 (H) 2019 02:18 PM    A-G Ratio 0.7 (L) 2019 02:18 PM      No results found for: HBA1C, FMH8TFMY, HGBE8, GIR7BGDT, VNE9KXQM      No results for input(s): CPK, CKMB, TROIQ in the last 72 hours. No lab exists for component: CKQMB, CPKMB        Problem List:     Problem List  Date Reviewed: 2019          Codes Class Noted    Prenatal care, subsequent pregnancy in first trimester ICD-10-CM: Z34.81  ICD-9-CM: V22.1  2019    Overview Addendum 2019  1:00 PM by Swetha Fitch RN     Primary Provider:  Yael Urias  P1- h/o miscarriage  EDC by 1st trim US   Cheryle Gaunt  Having another boy  Hx  39wk [place] 7lb at [facility]  IOB labs: wnl   Genetic Screening: Declines  Anatomy:  GTT:77  Flu:__ TDAP: done  Rhogam: Apos  GBS: GBS pos in urine; declines atb; risks reviewed  Circ:  Pt is from [employer] ELLA Garcia MD, [facility]

## 2019-07-30 ENCOUNTER — ROUTINE PRENATAL (OUTPATIENT)
Dept: OBGYN CLINIC | Age: 34
End: 2019-07-30

## 2019-07-30 VITALS
DIASTOLIC BLOOD PRESSURE: 76 MMHG | BODY MASS INDEX: 31.61 KG/M2 | HEIGHT: 69 IN | SYSTOLIC BLOOD PRESSURE: 134 MMHG | WEIGHT: 213.4 LBS

## 2019-07-30 DIAGNOSIS — Z34.81 PRENATAL CARE, SUBSEQUENT PREGNANCY IN FIRST TRIMESTER: ICD-10-CM

## 2019-07-30 DIAGNOSIS — Z34.80 SUPERVISION OF OTHER NORMAL PREGNANCY: Primary | ICD-10-CM

## 2019-07-30 NOTE — PATIENT INSTRUCTIONS
Weeks 34 to 36 of Your Pregnancy: Care Instructions  Your Care Instructions    By now, your baby and your belly have grown quite large. It is almost time to give birth. Your baby's lungs are almost ready to breathe air. The bones in your baby's head are now firm enough to protect it, but soft enough to move down through the birth canal.  You may feel excited, happy, anxious, or scared. You may wonder how you will know if you are in labor or what to expect during labor. Try to be flexible in your expectations of the birth. Because each birth is different, there is no way to know exactly what childbirth will be like for you. This care sheet will help you know what to expect and how to prepare. This may make your childbirth easier. If you haven't already had the Tdap shot during this pregnancy, talk to your doctor about getting it. It will help protect your  against pertussis infection. In the 36th week, most women have a test for group B streptococcus (GBS). GBS is a common bacteria that can live in the vagina and rectum. It can make your baby sick after birth. If you test positive, you will get antibiotics during labor. The medicine will keep your baby from getting the bacteria. Follow-up care is a key part of your treatment and safety. Be sure to make and go to all appointments, and call your doctor if you are having problems. It's also a good idea to know your test results and keep a list of the medicines you take. How can you care for yourself at home? Learn about pain relief choices  · Pain is different for every woman. Talk with your doctor about your feelings about pain. · You can choose from several types of pain relief. These include medicine or breathing techniques, as well as comfort measures. You can use more than one option. · If you choose to have pain medicine during labor, talk to your doctor about your options. Some medicines lower anxiety and help with some of the pain.  Others make your lower body numb so that you won't feel pain. · Be sure to tell your doctor about your pain medicine choice before you start labor or very early in your labor. You may be able to change your mind as labor progresses. · Rarely, a woman is put to sleep by medicine given through a mask or an IV. Labor and delivery  · The first stage of labor has three parts: early, active, and transition. ? Most women have early labor at home. You can stay busy or rest, eat light snacks, drink clear fluids, and start counting contractions. ? When talking during a contraction gets hard, you may be moving to active labor. During active labor, you should head for the hospital if you are not there already. ? You are in active labor when contractions come every 3 to 4 minutes and last about 60 seconds. Your cervix is opening more rapidly. ? If your water breaks, contractions will come faster and stronger. ? During transition, your cervix is stretching, and contractions are coming more rapidly. ? You may want to push, but your cervix might not be ready. Your doctor will tell you when to push. · The second stage starts when your cervix is completely opened and you are ready to push. ? Contractions are very strong to push the baby down the birth canal.  ? You will feel the urge to push. You may feel like you need to have a bowel movement. ? You may be coached to push with contractions. These contractions will be very strong, but you will not have them as often. You can get a little rest between contractions. ? You may be emotional and irritable. You may not be aware of what is going on around you.  ? One last push, and your baby is born. · The third stage is when a few more contractions push out the placenta. This may take 30 minutes or less. · The fourth stage is the welcome recovery. You may feel overwhelmed with emotions and exhausted but alert. This is a good time to start breastfeeding. Where can you learn more?   Go to http://piero-audrey.info/. Enter Z477 in the search box to learn more about \"Weeks 34 to 36 of Your Pregnancy: Care Instructions. \"  Current as of: September 5, 2018  Content Version: 12.1  © 2608-6318 Healthwise, Incorporated. Care instructions adapted under license by [employer] (which disclaims liability or warranty for this information). If you have questions about a medical condition or this instruction, always ask your healthcare professional. [address] any warranty or liability for your use of this information. Crescentic Advancement Flap Text: The defect edges were debeveled with a #15 scalpel blade.  Given the location of the defect and the proximity to free margins a crescentic advancement flap was deemed most appropriate.  Using a sterile surgical marker, the appropriate advancement flap was drawn incorporating the defect and placing the expected incisions within the relaxed skin tension lines where possible.    The area thus outlined was incised deep to adipose tissue with a #15 scalpel blade.  The skin margins were undermined to an appropriate distance in all directions utilizing iris scissors.

## 2019-08-12 ENCOUNTER — ROUTINE PRENATAL (OUTPATIENT)
Dept: OBGYN CLINIC | Age: 34
End: 2019-08-12

## 2019-08-12 VITALS
HEIGHT: 69 IN | BODY MASS INDEX: 32.05 KG/M2 | DIASTOLIC BLOOD PRESSURE: 80 MMHG | WEIGHT: 216.4 LBS | SYSTOLIC BLOOD PRESSURE: 114 MMHG

## 2019-08-12 DIAGNOSIS — Z34.81 PRENATAL CARE, SUBSEQUENT PREGNANCY IN FIRST TRIMESTER: ICD-10-CM

## 2019-08-12 DIAGNOSIS — Z34.83 PRENATAL CARE, SUBSEQUENT PREGNANCY IN THIRD TRIMESTER: Primary | ICD-10-CM

## 2019-08-12 NOTE — PATIENT INSTRUCTIONS

## 2019-08-12 NOTE — PROGRESS NOTES
Doing well, no concerns. GBS uria in 1st trimester. Declines cervical check.  cardiol appt this week; palpitations improved

## 2019-08-14 ENCOUNTER — CLINICAL SUPPORT (OUTPATIENT)
Dept: CARDIOLOGY CLINIC | Age: 34
End: 2019-08-14

## 2019-08-14 DIAGNOSIS — R00.2 PALPITATIONS: ICD-10-CM

## 2019-08-21 ENCOUNTER — ROUTINE PRENATAL (OUTPATIENT)
Dept: OBGYN CLINIC | Age: 34
End: 2019-08-21

## 2019-08-21 VITALS
SYSTOLIC BLOOD PRESSURE: 128 MMHG | HEIGHT: 69 IN | BODY MASS INDEX: 32.58 KG/M2 | DIASTOLIC BLOOD PRESSURE: 80 MMHG | WEIGHT: 220 LBS

## 2019-08-21 DIAGNOSIS — Z34.83 PRENATAL CARE, SUBSEQUENT PREGNANCY IN THIRD TRIMESTER: Primary | ICD-10-CM

## 2019-08-21 NOTE — PATIENT INSTRUCTIONS

## 2019-08-28 ENCOUNTER — ROUTINE PRENATAL (OUTPATIENT)
Dept: OBGYN CLINIC | Age: 34
End: 2019-08-28

## 2019-08-28 VITALS
WEIGHT: 221 LBS | BODY MASS INDEX: 32.73 KG/M2 | HEIGHT: 69 IN | SYSTOLIC BLOOD PRESSURE: 124 MMHG | DIASTOLIC BLOOD PRESSURE: 84 MMHG

## 2019-08-28 DIAGNOSIS — Z34.83 PRENATAL CARE, SUBSEQUENT PREGNANCY IN THIRD TRIMESTER: Primary | ICD-10-CM

## 2019-08-28 NOTE — PATIENT INSTRUCTIONS

## 2019-09-03 ENCOUNTER — ANESTHESIA EVENT (OUTPATIENT)
Dept: LABOR AND DELIVERY | Age: 34
End: 2019-09-03
Payer: COMMERCIAL

## 2019-09-03 ENCOUNTER — HOSPITAL ENCOUNTER (INPATIENT)
Age: 34
LOS: 2 days | Discharge: HOME | End: 2019-09-05
Attending: OBSTETRICS & GYNECOLOGY | Admitting: OBSTETRICS & GYNECOLOGY
Payer: COMMERCIAL

## 2019-09-03 ENCOUNTER — ROUTINE PRENATAL (OUTPATIENT)
Dept: OBGYN CLINIC | Age: 34
End: 2019-09-03

## 2019-09-03 ENCOUNTER — TELEPHONE (OUTPATIENT)
Dept: OBGYN CLINIC | Age: 34
End: 2019-09-03

## 2019-09-03 ENCOUNTER — ANESTHESIA (OUTPATIENT)
Dept: LABOR AND DELIVERY | Age: 34
End: 2019-09-03
Payer: COMMERCIAL

## 2019-09-03 VITALS
BODY MASS INDEX: 32.67 KG/M2 | DIASTOLIC BLOOD PRESSURE: 74 MMHG | SYSTOLIC BLOOD PRESSURE: 130 MMHG | WEIGHT: 220.6 LBS | HEIGHT: 69 IN

## 2019-09-03 DIAGNOSIS — Z34.83 PRENATAL CARE, SUBSEQUENT PREGNANCY IN THIRD TRIMESTER: Primary | ICD-10-CM

## 2019-09-03 PROBLEM — Z34.90 PREGNANCY: Status: ACTIVE | Noted: 2019-09-03

## 2019-09-03 LAB
ERYTHROCYTE [DISTWIDTH] IN BLOOD BY AUTOMATED COUNT: 13.3 % (ref 11.5–14.5)
HCT VFR BLD AUTO: 39 % (ref 35–47)
HGB BLD-MCNC: 13.2 G/DL (ref 11.5–16)
MCH RBC QN AUTO: 29.5 PG (ref 26–34)
MCHC RBC AUTO-ENTMCNC: 33.8 G/DL (ref 30–36.5)
MCV RBC AUTO: 87.2 FL (ref 80–99)
NRBC # BLD: 0 K/UL (ref 0–0.01)
NRBC BLD-RTO: 0 PER 100 WBC
PLATELET # BLD AUTO: 261 K/UL (ref 150–400)
PMV BLD AUTO: 10.1 FL (ref 8.9–12.9)
RBC # BLD AUTO: 4.47 M/UL (ref 3.8–5.2)
WBC # BLD AUTO: 18 K/UL (ref 3.6–11)

## 2019-09-03 PROCEDURE — A4300 CATH IMPL VASC ACCESS PORTAL: HCPCS

## 2019-09-03 PROCEDURE — 75410000000 HC DELIVERY VAGINAL/SINGLE

## 2019-09-03 PROCEDURE — 75410000002 HC LABOR FEE PER 1 HR

## 2019-09-03 PROCEDURE — 75410000003 HC RECOV DEL/VAG/CSECN EA 0.5 HR

## 2019-09-03 PROCEDURE — 4A0HXCZ MEASUREMENT OF PRODUCTS OF CONCEPTION, CARDIAC RATE, EXTERNAL APPROACH: ICD-10-PCS | Performed by: OBSTETRICS & GYNECOLOGY

## 2019-09-03 PROCEDURE — 36415 COLL VENOUS BLD VENIPUNCTURE: CPT

## 2019-09-03 PROCEDURE — 76060000078 HC EPIDURAL ANESTHESIA

## 2019-09-03 PROCEDURE — 65410000002 HC RM PRIVATE OB

## 2019-09-03 PROCEDURE — 74011000258 HC RX REV CODE- 258: Performed by: OBSTETRICS & GYNECOLOGY

## 2019-09-03 PROCEDURE — 74011250636 HC RX REV CODE- 250/636: Performed by: ANESTHESIOLOGY

## 2019-09-03 PROCEDURE — 3E0R3BZ INTRODUCTION OF ANESTHETIC AGENT INTO SPINAL CANAL, PERCUTANEOUS APPROACH: ICD-10-PCS | Performed by: ANESTHESIOLOGY

## 2019-09-03 PROCEDURE — 00HU33Z INSERTION OF INFUSION DEVICE INTO SPINAL CANAL, PERCUTANEOUS APPROACH: ICD-10-PCS | Performed by: ANESTHESIOLOGY

## 2019-09-03 PROCEDURE — 74011000250 HC RX REV CODE- 250: Performed by: ANESTHESIOLOGY

## 2019-09-03 PROCEDURE — 74011250637 HC RX REV CODE- 250/637: Performed by: OBSTETRICS & GYNECOLOGY

## 2019-09-03 PROCEDURE — 85027 COMPLETE CBC AUTOMATED: CPT

## 2019-09-03 PROCEDURE — 10907ZC DRAINAGE OF AMNIOTIC FLUID, THERAPEUTIC FROM PRODUCTS OF CONCEPTION, VIA NATURAL OR ARTIFICIAL OPENING: ICD-10-PCS | Performed by: OBSTETRICS & GYNECOLOGY

## 2019-09-03 PROCEDURE — 74011250636 HC RX REV CODE- 250/636: Performed by: OBSTETRICS & GYNECOLOGY

## 2019-09-03 PROCEDURE — 0HQ9XZZ REPAIR PERINEUM SKIN, EXTERNAL APPROACH: ICD-10-PCS | Performed by: OBSTETRICS & GYNECOLOGY

## 2019-09-03 PROCEDURE — 77030014125 HC TY EPDRL BBMI -B: Performed by: ANESTHESIOLOGY

## 2019-09-03 PROCEDURE — 77030005537 HC CATH URETH BARD -A

## 2019-09-03 RX ORDER — HYDROCORTISONE 1 %
CREAM (GRAM) TOPICAL AS NEEDED
Status: DISCONTINUED | OUTPATIENT
Start: 2019-09-03 | End: 2019-09-05 | Stop reason: HOSPADM

## 2019-09-03 RX ORDER — BUPIVACAINE HYDROCHLORIDE 2.5 MG/ML
INJECTION, SOLUTION EPIDURAL; INFILTRATION; INTRACAUDAL
Status: COMPLETED
Start: 2019-09-03 | End: 2019-09-03

## 2019-09-03 RX ORDER — SODIUM CHLORIDE, SODIUM LACTATE, POTASSIUM CHLORIDE, CALCIUM CHLORIDE 600; 310; 30; 20 MG/100ML; MG/100ML; MG/100ML; MG/100ML
125 INJECTION, SOLUTION INTRAVENOUS CONTINUOUS
Status: DISCONTINUED | OUTPATIENT
Start: 2019-09-03 | End: 2019-09-05 | Stop reason: HOSPADM

## 2019-09-03 RX ORDER — AMMONIA 15 % (W/V)
1 AMPUL (EA) INHALATION AS NEEDED
Status: DISCONTINUED | OUTPATIENT
Start: 2019-09-03 | End: 2019-09-05 | Stop reason: HOSPADM

## 2019-09-03 RX ORDER — BUPIVACAINE HYDROCHLORIDE 2.5 MG/ML
25 INJECTION, SOLUTION EPIDURAL; INFILTRATION; INTRACAUDAL ONCE
Status: DISCONTINUED | OUTPATIENT
Start: 2019-09-03 | End: 2019-09-04 | Stop reason: ALTCHOICE

## 2019-09-03 RX ORDER — SIMETHICONE 80 MG
80 TABLET,CHEWABLE ORAL
Status: DISCONTINUED | OUTPATIENT
Start: 2019-09-03 | End: 2019-09-05 | Stop reason: HOSPADM

## 2019-09-03 RX ORDER — OXYTOCIN/0.9 % SODIUM CHLORIDE 30/500 ML
333 PLASTIC BAG, INJECTION (ML) INTRAVENOUS ONCE
Status: COMPLETED | OUTPATIENT
Start: 2019-09-03 | End: 2019-09-03

## 2019-09-03 RX ORDER — ZOLPIDEM TARTRATE 5 MG/1
5 TABLET ORAL
Status: DISCONTINUED | OUTPATIENT
Start: 2019-09-03 | End: 2019-09-05 | Stop reason: HOSPADM

## 2019-09-03 RX ORDER — SODIUM CHLORIDE 900 MG/100ML
INJECTION INTRAVENOUS
Status: DISPENSED
Start: 2019-09-03 | End: 2019-09-04

## 2019-09-03 RX ORDER — OXYTOCIN/0.9 % SODIUM CHLORIDE 30/500 ML
0-25 PLASTIC BAG, INJECTION (ML) INTRAVENOUS
Status: DISCONTINUED | OUTPATIENT
Start: 2019-09-03 | End: 2019-09-04 | Stop reason: ALTCHOICE

## 2019-09-03 RX ORDER — SODIUM CHLORIDE 0.9 % (FLUSH) 0.9 %
5-40 SYRINGE (ML) INJECTION AS NEEDED
Status: DISCONTINUED | OUTPATIENT
Start: 2019-09-03 | End: 2019-09-05 | Stop reason: HOSPADM

## 2019-09-03 RX ORDER — EPHEDRINE SULFATE/0.9% NACL/PF 50 MG/5 ML
10 SYRINGE (ML) INTRAVENOUS
Status: DISCONTINUED | OUTPATIENT
Start: 2019-09-03 | End: 2019-09-04 | Stop reason: ALTCHOICE

## 2019-09-03 RX ORDER — TERBUTALINE SULFATE 1 MG/ML
0.25 INJECTION SUBCUTANEOUS AS NEEDED
Status: DISCONTINUED | OUTPATIENT
Start: 2019-09-03 | End: 2019-09-04 | Stop reason: ALTCHOICE

## 2019-09-03 RX ORDER — FENTANYL CITRATE 50 UG/ML
INJECTION, SOLUTION INTRAMUSCULAR; INTRAVENOUS AS NEEDED
Status: DISCONTINUED | OUTPATIENT
Start: 2019-09-03 | End: 2019-09-03 | Stop reason: HOSPADM

## 2019-09-03 RX ORDER — HYDROCODONE BITARTRATE AND ACETAMINOPHEN 7.5; 325 MG/1; MG/1
1 TABLET ORAL
Status: DISCONTINUED | OUTPATIENT
Start: 2019-09-03 | End: 2019-09-05 | Stop reason: HOSPADM

## 2019-09-03 RX ORDER — BUPIVACAINE HYDROCHLORIDE 2.5 MG/ML
INJECTION, SOLUTION EPIDURAL; INFILTRATION; INTRACAUDAL
Status: COMPLETED | OUTPATIENT
Start: 2019-09-03 | End: 2019-09-03

## 2019-09-03 RX ORDER — FENTANYL/BUPIVACAINE/NS/PF 2-1250MCG
1-16 PREFILLED PUMP RESERVOIR EPIDURAL CONTINUOUS
Status: DISCONTINUED | OUTPATIENT
Start: 2019-09-03 | End: 2019-09-04 | Stop reason: ALTCHOICE

## 2019-09-03 RX ORDER — SODIUM CHLORIDE 0.9 % (FLUSH) 0.9 %
5-40 SYRINGE (ML) INJECTION AS NEEDED
Status: DISCONTINUED | OUTPATIENT
Start: 2019-09-03 | End: 2019-09-04 | Stop reason: ALTCHOICE

## 2019-09-03 RX ORDER — IBUPROFEN 400 MG/1
800 TABLET ORAL EVERY 8 HOURS
Status: DISCONTINUED | OUTPATIENT
Start: 2019-09-03 | End: 2019-09-05 | Stop reason: HOSPADM

## 2019-09-03 RX ORDER — NALBUPHINE HYDROCHLORIDE 10 MG/ML
10 INJECTION, SOLUTION INTRAMUSCULAR; INTRAVENOUS; SUBCUTANEOUS
Status: DISCONTINUED | OUTPATIENT
Start: 2019-09-03 | End: 2019-09-04 | Stop reason: ALTCHOICE

## 2019-09-03 RX ORDER — DOCUSATE SODIUM 100 MG/1
100 CAPSULE, LIQUID FILLED ORAL
Status: DISCONTINUED | OUTPATIENT
Start: 2019-09-03 | End: 2019-09-05 | Stop reason: HOSPADM

## 2019-09-03 RX ORDER — ACETAMINOPHEN 325 MG/1
650 TABLET ORAL
Status: DISCONTINUED | OUTPATIENT
Start: 2019-09-03 | End: 2019-09-05 | Stop reason: HOSPADM

## 2019-09-03 RX ORDER — LIDOCAINE HYDROCHLORIDE AND EPINEPHRINE 15; 5 MG/ML; UG/ML
INJECTION, SOLUTION EPIDURAL AS NEEDED
Status: DISCONTINUED | OUTPATIENT
Start: 2019-09-03 | End: 2019-09-03 | Stop reason: HOSPADM

## 2019-09-03 RX ORDER — FENTANYL CITRATE 50 UG/ML
100 INJECTION, SOLUTION INTRAMUSCULAR; INTRAVENOUS ONCE
Status: DISCONTINUED | OUTPATIENT
Start: 2019-09-03 | End: 2019-09-04 | Stop reason: ALTCHOICE

## 2019-09-03 RX ORDER — HYDROCODONE BITARTRATE AND ACETAMINOPHEN 5; 325 MG/1; MG/1
1 TABLET ORAL
Status: DISCONTINUED | OUTPATIENT
Start: 2019-09-03 | End: 2019-09-05 | Stop reason: HOSPADM

## 2019-09-03 RX ORDER — HYDROCORTISONE ACETATE PRAMOXINE HCL 2.5; 1 G/100G; G/100G
CREAM TOPICAL AS NEEDED
Status: DISCONTINUED | OUTPATIENT
Start: 2019-09-03 | End: 2019-09-05 | Stop reason: HOSPADM

## 2019-09-03 RX ORDER — SODIUM CHLORIDE 0.9 % (FLUSH) 0.9 %
5-40 SYRINGE (ML) INJECTION EVERY 8 HOURS
Status: DISCONTINUED | OUTPATIENT
Start: 2019-09-03 | End: 2019-09-04 | Stop reason: ALTCHOICE

## 2019-09-03 RX ORDER — SODIUM CHLORIDE 0.9 % (FLUSH) 0.9 %
5-40 SYRINGE (ML) INJECTION EVERY 8 HOURS
Status: DISCONTINUED | OUTPATIENT
Start: 2019-09-03 | End: 2019-09-05 | Stop reason: HOSPADM

## 2019-09-03 RX ORDER — NALOXONE HYDROCHLORIDE 0.4 MG/ML
0.4 INJECTION, SOLUTION INTRAMUSCULAR; INTRAVENOUS; SUBCUTANEOUS AS NEEDED
Status: DISCONTINUED | OUTPATIENT
Start: 2019-09-03 | End: 2019-09-04 | Stop reason: ALTCHOICE

## 2019-09-03 RX ORDER — SODIUM CHLORIDE, SODIUM LACTATE, POTASSIUM CHLORIDE, CALCIUM CHLORIDE 600; 310; 30; 20 MG/100ML; MG/100ML; MG/100ML; MG/100ML
100 INJECTION, SOLUTION INTRAVENOUS CONTINUOUS
Status: DISCONTINUED | OUTPATIENT
Start: 2019-09-03 | End: 2019-09-04 | Stop reason: ALTCHOICE

## 2019-09-03 RX ADMIN — SODIUM CHLORIDE, SODIUM LACTATE, POTASSIUM CHLORIDE, AND CALCIUM CHLORIDE 125 ML/HR: 600; 310; 30; 20 INJECTION, SOLUTION INTRAVENOUS at 16:55

## 2019-09-03 RX ADMIN — AMPICILLIN SODIUM 2 G: 2 INJECTION, POWDER, FOR SOLUTION INTRAMUSCULAR; INTRAVENOUS at 16:54

## 2019-09-03 RX ADMIN — Medication 10 ML: at 21:18

## 2019-09-03 RX ADMIN — BUPIVACAINE HYDROCHLORIDE 2 ML: 2.5 INJECTION, SOLUTION EPIDURAL; INFILTRATION; INTRACAUDAL; PERINEURAL at 18:38

## 2019-09-03 RX ADMIN — IBUPROFEN 800 MG: 400 TABLET, FILM COATED ORAL at 22:19

## 2019-09-03 RX ADMIN — BUPIVACAINE HYDROCHLORIDE 8 ML: 2.5 INJECTION, SOLUTION EPIDURAL; INFILTRATION; INTRACAUDAL; PERINEURAL at 18:56

## 2019-09-03 RX ADMIN — LIDOCAINE HYDROCHLORIDE,EPINEPHRINE BITARTRATE 5 ML: 15; .005 INJECTION, SOLUTION EPIDURAL; INFILTRATION; INTRACAUDAL; PERINEURAL at 18:41

## 2019-09-03 RX ADMIN — FENTANYL CITRATE 100 MCG: 50 INJECTION, SOLUTION INTRAMUSCULAR; INTRAVENOUS at 18:41

## 2019-09-03 RX ADMIN — OXYTOCIN-SODIUM CHLORIDE 0.9% IV SOLN 30 UNIT/500ML 19980 MILLI-UNITS/HR: 30-0.9/5 SOLUTION at 19:48

## 2019-09-03 RX ADMIN — BUPIVACAINE HYDROCHLORIDE 3 ML: 2.5 INJECTION, SOLUTION EPIDURAL; INFILTRATION; INTRACAUDAL; PERINEURAL at 18:41

## 2019-09-03 RX ADMIN — Medication 10 ML/HR: at 18:45

## 2019-09-03 RX ADMIN — DOCUSATE SODIUM 100 MG: 100 CAPSULE, LIQUID FILLED ORAL at 22:19

## 2019-09-03 NOTE — H&P
History & Physical    Name: Christiano Marcelino MRN: [id number]  SSN: [id number]    YOB: 1985  Age: 29 y.o. Sex: female        Subjective:     Estimated Date of Delivery: 19  OB History        3    Para   1    Term   1            AB   1    Living   1       SAB   1    TAB        Ectopic        Molar        Multiple   0    Live Births   1                Ms. Deja Davis is admitted with pregnancy at 39w1d for active labor. Prenatal course was normal. Please see prenatal records for details. Patient Active Problem List    Diagnosis    Pregnancy    Prenatal care, subsequent pregnancy in first trimester     Primary Provider:  Homero Moran  P1- h/o miscarriage  EDC by 1st trim US   Kely Hardin  Having another boy  Hx  39wk [facility] [place] [facility]lb [facility]  IOB labs: wnl   Genetic Screening: Declines  Anatomy:  GTT:77  Flu:__ TDAP: done  Rhogam: Apos  GBS: GBS pos in urine; declines atb; risks reviewed  Circ:  Pt is from [place], [place]       No specialty comments available. Past Medical History:   Diagnosis Date    Abnormal Papanicolaou smear of cervix     colposcopy, dr. Gareth Butt Pap smear for cervical cancer screening 2017 neg HPV POS     History reviewed. No pertinent surgical history. Social History     Occupational History    Not on file   Tobacco Use    Smoking status: Never Smoker    Smokeless tobacco: Never Used   Substance and Sexual Activity    Alcohol use: No     Frequency: Never    Drug use: No    Sexual activity: Yes     Partners: Male     Birth control/protection: None     Family History   Problem Relation Age of Onset    No Known Problems Father     Elevated Lipids Mother        No Known Allergies  Prior to Admission medications    Medication Sig Start Date End Date Taking? Authorizing Provider   Lactobacillus acidophilus (PROBIOTIC PO) Take  by mouth. Yes Provider, Historical   prenatal multivit-ca-min-fe-fa tab Take  by mouth.    Yes Provider, Historical Review of Systems: A comprehensive review of systems was negative except for that written in the HPI. Objective:     Vitals:  Vitals:    09/03/19 1526 09/03/19 1530   BP: 116/73    Pulse: 85    Resp: 16    Temp: 97.5 °F (36.4 °C)    Weight:  220 lb (99.8 kg)   Height:  5' 9\" (1.753 m)        Physical Exam:  Patient without distress. Heart: Regular rate and rhythm  Lung: clear to auscultation throughout lung fields, no wheezes, no rales, no rhonchi and normal respiratory effort  Cervical Exam: 4 cm dilated    80% effaced    -1 station    Presenting Part: cephalic  Membranes:  Intact  Fetal Heart Rate: Reactive  Accelerations: yes    Prenatal Labs:   Lab Results   Component Value Date/Time    Rubella, External Immune 02/26/2019    GrBStrep, External Positive 02/26/2019    HBsAg, External Negative 02/26/2019    HIV, External Non-Reactive 02/26/2019    Gonorrhea, External Negative 02/26/2019    Chlamydia, External Negative 02/26/2019        Assessment/Plan:     Plan: Admit for Reassuring fetal status. Group B Strep was positive, will treat prophylactically with ampicillin.     Signed By:  Sara Goldstein MD     September 3, 2019

## 2019-09-03 NOTE — PROGRESS NOTES
1525-pt here from home with UC since last night, had appt at [facility] this am , 3cm  1530-SVE 4-5/80, spoke with Smith Chandra, she will come see pt   1705-Hirata here to see pt 5/90 1815-requesting epidural  1820-voided  1838-epidural placed by Dr Pollie Lesches  1855-redose from Pollie Lesches  1912-SVE due to pt discomfort C/C/+2, pushing assessed, called Smith Chandra for delivery  1929-hirata here  1930-pushing started

## 2019-09-03 NOTE — ANESTHESIA PREPROCEDURE EVALUATION
Relevant Problems   No relevant active problems       Anesthetic History   No history of anesthetic complications            Review of Systems / Medical History  Patient summary reviewed, nursing notes reviewed and pertinent labs reviewed    Pulmonary  Within defined limits                 Neuro/Psych   Within defined limits           Cardiovascular  Within defined limits                     GI/Hepatic/Renal  Within defined limits              Endo/Other  Within defined limits           Other Findings              Physical Exam    Airway  Mallampati: II  TM Distance: > 6 cm  Neck ROM: normal range of motion   Mouth opening: Normal     Cardiovascular  Regular rate and rhythm,  S1 and S2 normal,  no murmur, click, rub, or gallop             Dental  No notable dental hx       Pulmonary  Breath sounds clear to auscultation               Abdominal  GI exam deferred       Other Findings            Anesthetic Plan    ASA: 2  Anesthesia type: epidural          Induction: Intravenous  Anesthetic plan and risks discussed with: Patient

## 2019-09-03 NOTE — TELEPHONE ENCOUNTER
Call received at 2:57PM      29year old  39w1d pregnant patient last seen in the office today. Patient calling to say that for the last 2 hours contractions have been under 5 minutes and are currently every 3-4 minutes and lasting 1.5 minutes in length. Patient report vaginal discharge but denies ROM and reports positive fetal movement. Patient reports they are 20 minutes from the hospital .    Nurse Sindy Oar advised of Patient on the way to the hospital .    This nurse called report to [facility] and Delivery.

## 2019-09-03 NOTE — ANESTHESIA PROCEDURE NOTES
Epidural Block    Performed by: Fabi Gould MD  Authorized by: Fabi Gould MD     Pre-Procedure  Indication: labor epidural    Preanesthetic Checklist: patient identified, risks and benefits discussed, anesthesia consent, site marked, patient being monitored, timeout performed and anesthesia consent      Epidural:   Patient position:  Seated  Prep region:  Lumbar  Prep: Chlorhexidine    Location:  L2-3    Needle and Epidural Catheter:   Needle Type:  Tuohy  Needle Gauge:  17 G  Injection Technique:  Loss of resistance using saline  Attempts:  2  Catheter Size:  19 G  Events: no blood with aspiration, no cerebrospinal fluid with aspiration, no paresthesia and negative aspiration test    Test Dose:  Bupivacaine 0.25% and negative    Assessment:   Catheter Secured:  Tegaderm and tape  Insertion:  Uncomplicated  Patient tolerance:  Patient tolerated the procedure well with no immediate complications

## 2019-09-03 NOTE — PATIENT INSTRUCTIONS
Week 39 of Your Pregnancy: Care Instructions  Your Care Instructions    During these final weeks, you may feel anxious to see your new baby. [place] babies often look different from what you see in pictures or movies. Right after birth, their heads may have a strange shape. Their eyes may be puffy. And their genitals may be swollen. They may also have very dry skin, or red marks on the eyelids, nose, or neck. Still, most parents think their babies are beautiful. Follow-up care is a key part of your treatment and safety. Be sure to make and go to all appointments, and call your doctor if you are having problems. It's also a good idea to know your test results and keep a list of the medicines you take. How can you care for yourself at home? Prepare to breastfeed  · If you are breastfeeding, continue to eat healthy foods. · If your health care provider recommends it, keep taking your prenatal vitamins. · Talk to your doctor before you take any medicine or supplement. That's because some medicines can affect your breast milk. · You can help prevent sore nipples if you feed your baby in the correct position. Nurses will help you learn to do this. · Your  will need to be fed about every 1 to 3 hours. Choose the right birth control after your baby is born  · Women who are breastfeeding can still get pregnant. Use birth control if you don't want to get pregnant. · Intrauterine devices (IUDs) are very effective at preventing pregnancy and can provide birth control for 3 to 10 years, depending on the type. If you talk with your doctor before having your baby, the IUD can be placed right after you give birth. If you decide you want to get pregnant later, you can have it removed. They are safe to use while you are breastfeeding. · A hormonal implant is also very effective at preventing pregnancy. It is placed under the skin of the arm. This can be done right after you give birth.  It releases the hormone progestin and prevents pregnancy for about 3 years. This can also be removed by a doctor at any time. It is safe to use while you are breastfeeding. · Depo-Provera can be used while you are breastfeeding. It is a shot you get every 3 months. · Birth control pills work well. But you need a different kind of pill for the first few weeks after giving birth. And when you start taking these pills, you need to make sure to use another type of birth control for 7 days after you start your pack. · Diaphragms, cervical caps, and condoms with spermicide work less well after birth. If you have a diaphragm or cervical cap, talk to your doctor to see if you need a different size. · Tubal ligation (tying your tubes) and vasectomy are both permanent. These are good options if you are sure you are done having children. Where can you learn more? Go to http://piero-audrey.info/. Enter L496 in the search box to learn more about \"Week 39 of Your Pregnancy: Care Instructions. \"  Current as of: September 5, 2018  Content Version: 12.1  © 8573-6618 Healthwise, Incorporated. Care instructions adapted under license by [employer] (which disclaims liability or warranty for this information). If you have questions about a medical condition or this instruction, always ask your healthcare professional. [address] any warranty or liability for your use of this information.

## 2019-09-03 NOTE — PROGRESS NOTES
1930 Bedside SBAR report received from [address] [facility]    0960 TRANSFER - OUT REPORT:    Verbal report given to PRIYANKA Hutchins RN(name) on [facility]  being transferred to [facility](unit) for routine progression of care       Report consisted of patients Situation, Background, Assessment and   Recommendations(SBAR). Information from the following report(s) SBAR, Kardex, Intake/Output and MAR was reviewed with the receiving nurse. Lines:   Peripheral IV 09/03/19 Hand (Active)        Opportunity for questions and clarification was provided.       Patient transported with:   Registered Nurse

## 2019-09-04 PROCEDURE — 65410000002 HC RM PRIVATE OB

## 2019-09-04 PROCEDURE — 74011250637 HC RX REV CODE- 250/637: Performed by: OBSTETRICS & GYNECOLOGY

## 2019-09-04 RX ADMIN — IBUPROFEN 800 MG: 400 TABLET, FILM COATED ORAL at 07:14

## 2019-09-04 RX ADMIN — DOCUSATE SODIUM 100 MG: 100 CAPSULE, LIQUID FILLED ORAL at 20:28

## 2019-09-04 RX ADMIN — IBUPROFEN 800 MG: 400 TABLET, FILM COATED ORAL at 15:47

## 2019-09-04 NOTE — PROGRESS NOTES
TRANSFER - IN REPORT:    Verbal report received from [address] [place] [address] rn(name) on Christiano Marcelino  being received from labor and delivery(unit) for routine progression of care      Report consisted of patients Situation, Background, Assessment and   Recommendations(SBAR). Information from the following report(s) SBAR, Procedure Summary, Intake/Output, MAR and Recent Results was reviewed with the receiving nurse. Opportunity for questions and clarification was provided. Assessment completed upon patients arrival to unit and care assumed.

## 2019-09-04 NOTE — ROUTINE PROCESS
0730: Bedside shift change report given to ELLA Mcqueen RN (oncoming nurse) by Mesfin Johnson RN (offgoing nurse). Report included the following information SBAR.

## 2019-09-04 NOTE — LACTATION NOTE
This note was copied from a baby's chart. Not seen at breast, mother declines [facility] consult, expresses confidence in ability to breastfeed independently.

## 2019-09-04 NOTE — PROGRESS NOTES
Post-Partum Day Number 1 Progress Note    Pk Esquivel     Assessment: Doing well, post partum day 1    Plan:  1. Continue routine postpartum and perineal care as well as maternal education. 2. The risks and benefits of the circumcision  procedure and anesthesia including: bleeding, infection, variability of cosmetic results were discussed at length with the mother. She is aware that future repeat procedures may be necessary. She gives informed consent to proceed as noted and her questions are answered. Information for the patient's :  Raul Neumann [[id number]]   Vaginal, Spontaneous   Patient doing well without significant complaint. Voiding without difficulty, normal lochia. Vitals:  Visit Vitals  /65 (BP 1 Location: Left arm, BP Patient Position: At rest;Supine)   Pulse 99   Temp 98.3 °F (36.8 °C)   Resp 16   Ht 5' 9\" (1.753 m)   Wt 220 lb (99.8 kg)   LMP 2018 (Approximate)   SpO2 100%   Breastfeeding? Unknown   BMI 32.49 kg/m²     Temp (24hrs), Av.8 °F (36.6 °C), Min:97.5 °F (36.4 °C), Max:98.3 °F (36.8 °C)        Exam:   Patient without distress. Abdomen soft, fundus firm, nontender                Perineum with normal lochia noted. Lower extremities are negative for swelling, cords or tenderness.     Labs:     Lab Results   Component Value Date/Time    WBC 18.0 (H) 2019 03:49 PM    WBC 10.6 2019 03:57 PM    WBC 14.3 (H) 2019 02:18 PM    WBC 10.4 2019 03:58 PM    WBC 6.4 2018 09:19 AM    WBC 13.2 (H) 2016 01:05 AM    HGB 13.2 2019 03:49 PM    HGB 11.6 2019 03:57 PM    HGB 12.4 2019 02:18 PM    HGB 12.6 2019 03:58 PM    HGB 13.5 2018 09:19 AM    HGB 12.3 2016 01:05 AM    HCT 39.0 2019 03:49 PM    HCT 34.0 2019 03:57 PM    HCT 37.6 2019 02:18 PM    HCT 37.7 2019 03:58 PM    HCT 41.6 2018 09:19 AM    HCT 36.3 2016 01:05 AM    PLATELET 277 09/03/2019 03:49 PM    PLATELET 122 46/25/1025 03:57 PM    PLATELET 790 47/78/8890 02:18 PM    PLATELET 749 1986 03:58 PM    PLATELET 588 73/05/2548 09:19 AM    PLATELET 417 97/21/7452 01:05 AM    Hgb, External 11.6 05/14/2019    Hgb, External 12.6 02/26/2019    Hgb, External 37.9 02/17/2016    Hct, External 34.0 05/14/2019    Hct, External 37.7 02/26/2019    Platelet cnt., External 306 05/14/2019    Platelet cnt., External 352 02/26/2019       Recent Results (from the past 24 hour(s))   CBC W/O DIFF    Collection Time: 09/03/19  3:49 PM   Result Value Ref Range    WBC 18.0 (H) 3.6 - 11.0 K/uL    RBC 4.47 3.80 - 5.20 M/uL    HGB 13.2 11.5 - 16.0 g/dL    HCT 39.0 35.0 - 47.0 %    MCV 87.2 80.0 - 99.0 FL    MCH 29.5 26.0 - 34.0 PG    MCHC 33.8 30.0 - 36.5 g/dL    RDW 13.3 11.5 - 14.5 %    PLATELET 729 531 - 392 K/uL    MPV 10.1 8.9 - 12.9 FL    NRBC 0.0 0  WBC    ABSOLUTE NRBC 0.00 0.00 - 0.01 K/uL

## 2019-09-05 VITALS
SYSTOLIC BLOOD PRESSURE: 118 MMHG | DIASTOLIC BLOOD PRESSURE: 71 MMHG | BODY MASS INDEX: 32.58 KG/M2 | OXYGEN SATURATION: 100 % | RESPIRATION RATE: 16 BRPM | TEMPERATURE: 97.5 F | HEART RATE: 80 BPM | WEIGHT: 220 LBS | HEIGHT: 69 IN

## 2019-09-05 PROCEDURE — 74011250637 HC RX REV CODE- 250/637: Performed by: OBSTETRICS & GYNECOLOGY

## 2019-09-05 RX ADMIN — IBUPROFEN 800 MG: 400 TABLET, FILM COATED ORAL at 02:40

## 2019-09-05 NOTE — ROUTINE PROCESS
0730: Bedside shift change report given to ELLA Mcqueen RN (oncoming nurse) by Jacquelin Murguia RN (offgoing nurse). Report included the following information SBAR.   4565: Discharge instructions reviewed with pt and all questions answered. Follow up in 6 weeks with Dr. Shila Grijalva. Pt discharged in wheelchair by volunteers.

## 2019-09-05 NOTE — LACTATION NOTE
This note was copied from a baby's chart. Mom and baby scheduled for discharge today. I did not see the baby at the breast. Baby nursing well and has improved throughout post partum stay, deep latch maintained, mother is comfortable, milk is in transition, baby feeding vigorously with rhythmic suck, swallow, breathe pattern, with audible swallowing, and evident milk transfer, both breasts offered, baby is asleep following feeding. Baby is feeding on demand, voiding and stools present as appropriate over the last 24 hours. Breast feeding teaching completed and all questions answered. Mom has no further questions for lactation before discharge.

## 2019-09-05 NOTE — PROGRESS NOTES
Post-Partum Day Number 2 Progress Note    Alida Yepez     Assessment: Doing well, post partum day 2    Plan:   1. Discharge home today  2. Follow up in office in 6 weeks with Chelsea Chapa MD  3. Post partum activity advised, diet as tolerated  4. Discharge Medications: ibuprofen, percocet and medications prior to admission    Information for the patient's :  Eloise Rebollar [[id number]]   Vaginal, Spontaneous   Patient doing well without significant complaint. Voiding without difficulty, normal lochia. Vitals:  Visit Vitals  /62 (BP 1 Location: Left arm, BP Patient Position: At rest)   Pulse 64   Temp 97.8 °F (36.6 °C)   Resp 16   Ht 5' 9\" (1.753 m)   Wt 220 lb (99.8 kg)   LMP 2018 (Approximate)   SpO2 100%   Breastfeeding? Unknown   BMI 32.49 kg/m²     Temp (24hrs), Av.9 °F (36.6 °C), Min:97.5 °F (36.4 °C), Max:98.3 °F (36.8 °C)      Exam:         Patient without distress. Abdomen soft, fundus firm, nontender                 Lower extremities are negative for swelling, cords or tenderness.     Labs:     Lab Results   Component Value Date/Time    WBC 18.0 (H) 2019 03:49 PM    WBC 10.6 2019 03:57 PM    WBC 14.3 (H) 2019 02:18 PM    WBC 10.4 2019 03:58 PM    WBC 6.4 2018 09:19 AM    WBC 13.2 (H) 2016 01:05 AM    HGB 13.2 2019 03:49 PM    HGB 11.6 2019 03:57 PM    HGB 12.4 2019 02:18 PM    HGB 12.6 2019 03:58 PM    HGB 13.5 2018 09:19 AM    HGB 12.3 2016 01:05 AM    HCT 39.0 2019 03:49 PM    HCT 34.0 2019 03:57 PM    HCT 37.6 2019 02:18 PM    HCT 37.7 2019 03:58 PM    HCT 41.6 2018 09:19 AM    HCT 36.3 2016 01:05 AM    PLATELET 747  03:49 PM    PLATELET 684  03:57 PM    PLATELET 479  02:18 PM    PLATELET 609  03:58 PM    PLATELET 561 15/79/2200 09:19 AM    PLATELET 329  01:05 AM    Hgb, External 11.6 05/14/2019    Hgb, External 12.6 02/26/2019    Hgb, External 37.9 02/17/2016    Hct, External 34.0 05/14/2019    Hct, External 37.7 02/26/2019    Platelet cnt., External 306 05/14/2019    Platelet cnt., External 352 02/26/2019       No results found for this or any previous visit (from the past 24 hour(s)).

## 2019-09-05 NOTE — DISCHARGE INSTRUCTIONS
Breastfeeding Support Group  [employer] [facility] meets the  and  of each month in the [facility] from 10:00-11:00, (located behind [employer] on the first floor) Meetings are facilitated by board certified lactation consultants and all breastfeeding moms and their infants are invited. Postpartum Support Group-Offering Hope and Help for New Mothers  The Postpartum Support Group meets on the  and  of each month from 9:00am-10:30am in the [address] [place] [address], located on the [facility] floor, behind the cafeteria.      POSTPARTUM DISCHARGE INSTRUCTIONS       Name:  López Cruz  YOB: 1985  Admission Diagnosis:  Pregnancy [Z34.90]  Labor without complication [Z77]     Discharge Diagnosis:    Problem List as of 2019 Date Reviewed: 9/3/2019          Codes Class Noted - Resolved    Pregnancy ICD-10-CM: Z34.90  ICD-9-CM: V22.2  9/3/2019 - Present        Labor without complication [id number]: S01  ICD-9-CM: 474  9/3/2019 - Present        Prenatal care, subsequent pregnancy in first trimester ICD-10-CM: Z34.81  ICD-9-CM: V22.1  2019 - Present    Overview Addendum 2019 11:32 AM by Siomara Gilliam RN     Primary Provider:  Elston Hodgkin  P1- h/o miscarriage  EDC by 1st trim US   Sebas Benavidez  Having another boy  Hx  39wk SR[facility] [place] 7lb at [facility]  IOB labs: wnl   Genetic Screening: Declines  Anatomy:  GTT:77  Flu:__ TDAP: done  Rhogam: Apos  GBS: GBS pos in urine; declines atb; risks reviewed  Circ:  Pt is from [facility]: Prolonged spontaneous rupture of membranes ICD-10-CM: O42.90  ICD-9-CM: 658.20  2016 - 2016        RESOLVED: Normal labor ICD-10-CM: O80, Z37.9  ICD-9-CM: 122  2016 - 2016        RESOLVED: Supervision of normal first pregnancy ICD-10-CM: Z34.00  ICD-9-CM: V22.0  2016 - 2016    Overview Addendum 2016  2:03 PM by Shyam Solomon MD Transfer at 35 weeks  Marginal previa resolved. GBS POSITIVE                  Attending Physician:  Starr Nguyen MD    Delivery Type:  Vaginal Childbirth with Episiotomy, Laceration or Tear: What To Expect At [facility] body will slowly heal in the next few weeks. It is easy to get too tired and overwhelmed during the first weeks after your baby is born. Changes in your hormones can shift your mood without warning. You may find it hard to meet the extra demands on your energy and time. Take it easy on yourself. Follow-up care is a key part of your treatment and safety. Be sure to make and go to all appointments, and call your doctor if you are having problems. It's also a good idea to know your test results and keep a list of the medicines you take. How can you care for yourself at home? Vaginal Bleeding and Cramps  · After delivery, you will have a bloody discharge from the vagina. This will turn pink within a week and then white or yellow after about 10 days. It may last for 2 to 4 weeks or longer, until the uterus has healed. Use pads instead of tampons until you stop bleeding. · Do not worry if you pass some blood clots, as long as they are smaller than a golf ball. If you have a tear or stitches in your vaginal area, change the pad at least every 4 hours to prevent soreness and infection. · You may have cramps for the first few days after childbirth. These are normal and occur as the uterus shrinks to normal size. Take an over-the-counter pain medicine, such as acetaminophen (Tylenol), ibuprofen (Advil, Motrin), or naproxen (Aleve), for cramps. Read and follow all instructions on the label. Do not take aspirin, because it can cause more bleeding. Do not take acetaminophen (Tylenol) and other acetaminophen containing medications (i.e. Percocet) at the same time.      Episiotomy, Lacerations or Tears  · If you have stitches, they will dissolve on their own and do not need to be removed. · Put ice or a cold pack on your painful area for 10 to 20 minutes at a time, several times a day, for the first few days. Put a thin cloth between the ice and your skin. · Sit in a few inches of warm water (sitz bath) 3 times a day and after bowel movements. The warm water helps with pain and itching. If you do not have a tub, a warm shower might help. Breast fullness  · Your breasts may overfill (engorge) in the first few days after delivery. To help milk flow and to relieve pain, warm your breasts in the shower or by using warm, moist towels before nursing. · If you are not nursing, do not put warmth on your breasts or touch your breasts. Wear a tight bra or sports bra and use ice until the fullness goes away. This usually takes 2 to 3 days. · Put ice or a cold pack on your breast after nursing to reduce swelling and pain. Put a thin cloth between the ice and your skin. Activity  · Eat a balanced diet. Do not try to lose weight by cutting calories. Keep taking your prenatal vitamins, or take a multivitamin. · Get as much rest as you can. Try to take naps when your baby sleeps during the day. · Get some exercise every day. But do not do any heavy exercise until your doctor says it is okay. · Wait until you are healed (about 4 to 6 weeks) before you have sexual intercourse. Your doctor will tell you when it is okay to have sex. · Talk to your doctor about birth control. You can get pregnant even before your period returns. Also, you can get pregnant while you are breast-feeding. Mental Health  · Many women get the \"baby blues\" during the first few days after childbirth. You may lose sleep, feel irritable, and cry easily. You may feel happy one minute and sad the next. Hormone changes are one cause of these emotional changes. Also, the demands of a new baby, along with visits from relatives or other family needs, add to a mother's stress. The \"baby blues\" often peak around the fourth day. Then they ease up in less than 2 weeks. · If your moodiness or anxiety lasts for more than 2 weeks, or if you feel like life is not worth living, you may have postpartum depression. This is different for each mother. Some mothers with serious depression may worry intensely about their infant's well-being. Others may feel distant from their child. Some mothers might even feel that they might harm their baby. A mother may have signs of paranoia, wondering if someone is watching her. · With all the changes in your life, you may not know if you are depressed. Pregnancy sometimes causes changes in how you feel that are similar to the symptoms of depression. · Symptoms of depression include:  · Feeling sad or hopeless and losing interest in daily activities. These are the most common symptoms of depression. · Sleeping too much or not enough. · Feeling tired. You may feel as if you have no energy. · Eating too much or too little. · POSTPARTUM SUPPORT INTERNATIONAL (PSI) offers a Warm line; Chat with the Expert phone sessions; Information and Articles about Pregnancy and Postpartum Mood Disorders; Comprehensive List of Free Support Groups; Knowledgeable local coordinators who will offer support, information, and resources; Guide to Resources on [employer]; Calendar of events in the  mood disorders community; Latest News and Research; and [facility] [address] for [employer]. Remember - You are not alone; You are not to blame; With help, you will be well. 3-713-881-PPD(2138). WWW. POSTPARTUM. NET    · Writing or talking about death, such as writing suicide notes or talking about guns, knives, or pills. Keep the numbers for these national suicide hotlines: 2-309-835-TALK (6-928.373.9943) and 0-584-WWIZLHU (7-258.986.5088). If you or someone you know talks about suicide or feeling hopeless, get help right away.     Constipation and Hemorrhoids  Drink plenty of fluids, enough so that your urine is light yellow or clear like water. If you have kidney, heart, or liver disease and have to limit fluids, talk with your doctor before you increase the amount of fluids you drink. · Eat plenty of fiber each day. Have a bran muffin or bran cereal for breakfast, and try eating a piece of fruit for a mid-afternoon snack. · For painful, itchy hemorrhoids, put ice or a cold pack on the area several times a day for 10 minutes at a time. Follow this by putting a warm compress on the area for another 10 to 20 minutes or by sitting in a shallow, warm bath. When should you call for help? Call 911 anytime you think you may need emergency care. For example, call if:  · You are thinking of hurting yourself, your baby, or anyone else. · You passed out (lost consciousness). · You have symptoms of a blood clot in your lung (called a pulmonary embolism). These may include:  · Sudden chest pain. · Trouble breathing. · Coughing up blood. Call your doctor now or seek immediate medical care if:  · You have severe vaginal bleeding. · You are soaking through a pad each hour for 2 or more hours. · Your vaginal bleeding seems to be getting heavier or is still bright red 4 days after delivery. · You are dizzy or lightheaded, or you feel like you may faint. · You are vomiting or cannot keep fluids down. · You have a fever. · You have new or more belly pain. · You pass tissue (not just blood). · Your vaginal discharge smells bad. · Your belly feels tender or full and hard. · Your breasts are continuously painful or red. · You feel sad, anxious, or hopeless for more than a few days. · You have sudden, severe pain in your belly. · You have symptoms of a blood clot in your leg (called a deep vein thrombosis), such as:  · Pain in your calf, back of the knee, thigh, or groin. · Redness and swelling in your leg or groin. · You have symptoms of preeclampsia, such as:  · Sudden swelling of your face, hands, or feet.   · New vision problems (such as dimness or blurring). · A severe headache. · Your blood pressure is higher than it should be or rises suddenly. · You have new nausea or vomiting. Watch closely for changes in your health, and be sure to contact your doctor if you have any problems. Additional Information:  Postpartum Support    PARENTS:  Are you feeling sad or depressed? Is it difficult for you to enjoy yourself? Do you feel more irritable or tense? Do you feel anxious or panicky? Are you having difficulty bonding with your baby? Do you feel as if you are \"out of control\" or \"going crazy\"? Are you worried that you might hurt your baby or yourself? FAMILIES: Do you worry that something is wrong but don't know how to help? Do you think that your partner or spouse is having problems coping? Are you worried that it may never get better? While many women experience some mild mood change or \"the blues\" during or after the birth of a child, 1 in 9 women experience more significant symptoms of depression or anxiety. 1 in 10 Dads become depressed during the first year. Things you can do  Being a good parent includes taking care of yourself. If you take care of yourself, you will be able to take better care of your baby and your family. · Talk to a counselor or healthcare provider who has training in  mood and anxiety problems. · Learn as much as you can about pregnancy and postpartum depression and anxiety. · Get support from family and friends. Ask for help when you need it. · Join a support group in your area or online. · Keep active by walking, stretching or whatever form of exercise helps you to feel better. · Get enough rest and time for yourself. · Eat a healthy diet. · Don't give up! It may take more than one try to get the right help you need.        These are general instructions for a healthy lifestyle:    No smoking/ No tobacco products/ Avoid exposure to second hand smoke    Surgeon General's Warning:  Quitting smoking now greatly reduces serious risk to your health. Obesity, smoking, and sedentary lifestyle greatly increases your risk for illness    A healthy diet, regular physical exercise & weight monitoring are important for maintaining a healthy lifestyle    Recognize signs and symptoms of STROKE:    F-face looks uneven    A-arms unable to move or move unevenly    S-speech slurred or non-existent    T-time-call 911 as soon as signs and symptoms begin - DO NOT go       back to bed or wait to see if you get better - TIME IS BRAIN. I have had the opportunity to make my options or choices for discharge. I have received and understand these instructions.

## 2019-09-05 NOTE — ROUTINE PROCESS
Bedside shift change report given to [address] [place] [address] (oncoming nurse) by EMILIANO May RN (offgoing nurse). Report included the following information SBAR, Procedure Summary, Intake/Output, MAR and Recent Results.

## 2019-09-05 NOTE — DISCHARGE SUMMARY
Obstetrical Discharge Summary     Name: Li Guerra MRN: [id number]  SSN: [id number]    YOB: 1985  Age: 29 y.o. Sex: female      Admit Date: 9/3/2019    Discharge Date: 2019     Admitting Physician: Juanita Escobar MD     Attending Physician:  Roberta Friend MD     Admission Diagnoses: Pregnancy [Z34.90]; Labor without complication [Y39]    Discharge Diagnoses:   Information for the patient's :  Bev Ruby [[id number]]   Delivery of a 7 lb 11.6 oz (3.505 kg) male infant via Vaginal, Spontaneous on 9/3/2019 at 5:04 PM  by Juanita Escobar. Apgars were 9  and 10 . Additional Diagnoses:   Hospital Problems  Date Reviewed: 9/3/2019          Codes Class Noted POA    Pregnancy ICD-10-CM: Z34.90  ICD-9-CM: V22.2  9/3/2019 Unknown        Labor without complication [id number]: D01  ICD-9-CM: 933  9/3/2019 Unknown             Lab Results   Component Value Date/Time    Rubella, External Immune 2019    GrBStrep, External Positive 2019       Hospital Course: Normal hospital course following the delivery. Disposition at Discharge: Home or self care    Discharged Condition: Stable    Patient Instructions:   Current Discharge Medication List      CONTINUE these medications which have NOT CHANGED    Details   Lactobacillus acidophilus (PROBIOTIC PO) Take  by mouth.      prenatal multivit-ca-min-fe-fa tab Take  by mouth. Reference my discharge instructions.     Follow-up Appointments   Procedures    FOLLOW UP VISIT Appointment in: 6 Weeks     Standing Status:   Standing     Number of Occurrences:   1     Order Specific Question:   Appointment in     Answer:   6 Weeks        Signed By:  Juanita Escobar MD     2019

## 2019-09-17 ENCOUNTER — TELEPHONE (OUTPATIENT)
Dept: INTERNAL MEDICINE CLINIC | Age: 34
End: 2019-09-17

## 2019-09-17 NOTE — TELEPHONE ENCOUNTER
FYI: patient has been scheduled to see NP next Tuesday 09/24 for her CPE , patient declined to wait for her PCPs first available in late Jan 2020. Patient can be reached at [phone] to r/s if need be.

## 2019-09-24 ENCOUNTER — OFFICE VISIT (OUTPATIENT)
Dept: INTERNAL MEDICINE CLINIC | Age: 34
End: 2019-09-24

## 2019-09-24 VITALS
SYSTOLIC BLOOD PRESSURE: 101 MMHG | OXYGEN SATURATION: 98 % | BODY MASS INDEX: 29.8 KG/M2 | TEMPERATURE: 97.8 F | RESPIRATION RATE: 12 BRPM | WEIGHT: 201.2 LBS | HEIGHT: 69 IN | DIASTOLIC BLOOD PRESSURE: 66 MMHG | HEART RATE: 58 BPM

## 2019-09-24 DIAGNOSIS — Z23 ENCOUNTER FOR IMMUNIZATION: ICD-10-CM

## 2019-09-24 DIAGNOSIS — Z00.00 WELL WOMAN EXAM (NO GYNECOLOGICAL EXAM): Primary | ICD-10-CM

## 2019-09-24 DIAGNOSIS — L30.9 ECZEMA, UNSPECIFIED TYPE: ICD-10-CM

## 2019-09-24 RX ORDER — TRIAMCINOLONE ACETONIDE 1 MG/G
CREAM TOPICAL 2 TIMES DAILY
Qty: 15 G | Refills: 0 | Status: SHIPPED | OUTPATIENT
Start: 2019-09-24 | End: 2019-10-17

## 2019-09-24 NOTE — PATIENT INSTRUCTIONS
Influenza (Flu) Vaccine (Inactivated or Recombinant): What You Need to Know  Why get vaccinated? Influenza (\"flu\") is a contagious disease that spreads around the [place] every winter, usually between October and May. Flu is caused by influenza viruses and is spread mainly by coughing, sneezing, and close contact. Anyone can get flu. Flu strikes suddenly and can last several days. Symptoms vary by age, but can include:  · Fever/chills. · Sore throat. · Muscle aches. · Fatigue. · Cough. · Headache. · Runny or stuffy nose. Flu can also lead to pneumonia and blood infections, and cause diarrhea and seizures in children. If you have a medical condition, such as heart or lung disease, flu can make it worse. Flu is more dangerous for some people. Infants and young children, people 72years of age and older, pregnant women, and people with certain health conditions or a weakened immune system are at greatest risk. Each year thousands of people in the [facility] die from flu, and many more are hospitalized. Flu vaccine can:  · Keep you from getting flu. · Make flu less severe if you do get it. · Keep you from spreading flu to your family and other people. Inactivated and recombinant flu vaccines  A dose of flu vaccine is recommended every flu season. Children 6 months through 6years of age may need two doses during the same flu season. Everyone else needs only one dose each flu season. Some inactivated flu vaccines contain a very small amount of a mercury-based preservative called thimerosal. Studies have not shown thimerosal in vaccines to be harmful, but flu vaccines that do not contain thimerosal are available. There is no live flu virus in flu shots. They cannot cause the flu. There are many flu viruses, and they are always changing. Each year a new flu vaccine is made to protect against three or four viruses that are likely to cause disease in the upcoming flu season.  But even when the vaccine doesn't exactly match these viruses, it may still provide some protection. Flu vaccine cannot prevent:  · Flu that is caused by a virus not covered by the vaccine. · Illnesses that look like flu but are not. Some people should not get this vaccine  Tell the person who is giving you the vaccine:  · If you have any severe (life-threatening) allergies. If you ever had a life-threatening allergic reaction after a dose of flu vaccine, or have a severe allergy to any part of this vaccine, you may be advised not to get vaccinated. Most, but not all, types of flu vaccine contain a small amount of egg protein. · If you ever had Guillain-Barré syndrome (also called GBS) Some people with a history of GBS should not get this vaccine. This should be discussed with your doctor. · If you are not feeling well. It is usually okay to get flu vaccine when you have a mild illness, but you might be asked to come back when you feel better. Risks of a vaccine reaction  With any medicine, including vaccines, there is a chance of reactions. These are usually mild and go away on their own, but serious reactions are also possible. Most people who get a flu shot do not have any problems with it. Minor problems following a flu shot include:  · Soreness, redness, or swelling where the shot was given  · Hoarseness  · Sore, red or itchy eyes  · Cough  · Fever  · Aches  · Headache  · Itching  · Fatigue  If these problems occur, they usually begin soon after the shot and last 1 or 2 days. More serious problems following a flu shot can include the following:  · There may be a small increased risk of Guillain-Barré Syndrome (GBS) after inactivated flu vaccine. This risk has been estimated at 1 or 2 additional cases per million people vaccinated. This is much lower than the risk of severe complications from flu, which can be prevented by flu vaccine.   · [facility] children who get the flu shot along with pneumococcal vaccine (PCV13) and/or DTaP vaccine at the same time might be slightly more likely to have a seizure caused by fever. Ask your doctor for more information. Tell your doctor if a child who is getting flu vaccine has ever had a seizure  Problems that could happen after any injected vaccine:  · People sometimes faint after a medical procedure, including vaccination. Sitting or lying down for about 15 minutes can help prevent fainting, and injuries caused by a fall. Tell your doctor if you feel dizzy, or have vision changes or ringing in the ears. · Some people get severe pain in the shoulder and have difficulty moving the arm where a shot was given. This happens very rarely. · Any medication can cause a severe allergic reaction. Such reactions from a vaccine are very rare, estimated at about 1 in a million doses, and would happen within a few minutes to a few hours after the vaccination. As with any medicine, there is a very remote chance of a vaccine causing a serious injury or death. The safety of vaccines is always being monitored. For more information, visit: www.cdc.gov/vaccinesafety/. What if there is a serious reaction? What should I look for? · Look for anything that concerns you, such as signs of a severe allergic reaction, very high fever, or unusual behavior. Signs of a severe allergic reaction can include hives, swelling of the face and throat, difficulty breathing, a fast heartbeat, dizziness, and weakness - usually within a few minutes to a few hours after the vaccination. What should I do? · If you think it is a severe allergic reaction or other emergency that can't wait, call 9-1-1 and get the person to the nearest hospital. Otherwise, call your doctor. · Reactions should be reported to the \"Vaccine Adverse Event Reporting System\" (VAERS). Your doctor should file this report, or you can do it yourself through the VAERS website at www.vaers. [facility].gov, or by calling 6-341.982.4573.   [employer] does not give medical advice. The National Vaccine Injury Compensation Program  The National Vaccine Injury Compensation Program (VICP) is a federal program that was created to compensate people who may have been injured by certain vaccines. Persons who believe they may have been injured by a vaccine can learn about the program and about filing a claim by calling 6-520.388.9147 or visiting the [employer] website at www.[facility].gov/vaccinecompensation. There is a time limit to file a claim for compensation. How can I learn more? · Ask your healthcare provider. He or she can give you the vaccine package insert or suggest other sources of information. · Call your local or state health department. · Contact the Centers for Disease Control and Prevention (CDC):  ? Call 7-557.644.9186 (1-800-CDC-INFO) or  ? Visit CDC's website at www.cdc.gov/flu  Vaccine Information Statement  Inactivated Influenza Vaccine  8/7/2015)  42 TEODORA Medina 459XR-90  Department of Health and Human Services  Centers for Disease Control and Prevention  Many Vaccine Information Statements are available in [place] and other languages. See www.immunize.org/vis. Muchas hojas de información sobre vacunas están disponibles en español y en otros idiomas. Visite www.immunize.org/vis. Care instructions adapted under license by [employer] (which disclaims liability or warranty for this information). If you have questions about a medical condition or this instruction, always ask your healthcare professional. [place] [address] any warranty or liability for your use of this information. Well Visit, Ages 25 to 48: Care Instructions  Your Care Instructions    Physical exams can help you stay healthy. Your doctor has checked your overall health and may have suggested ways to take good care of yourself. He or she also may have recommended tests. At home, you can help prevent illness with healthy eating, regular exercise, and other steps.   Follow-up care is a key part of your treatment and safety. Be sure to make and go to all appointments, and call your doctor if you are having problems. It's also a good idea to know your test results and keep a list of the medicines you take. How can you care for yourself at home? · Reach and stay at a healthy weight. This will lower your risk for many problems, such as obesity, diabetes, heart disease, and high blood pressure. · Get at least 30 minutes of physical activity on most days of the week. Walking is a good choice. You also may want to do other activities, such as running, swimming, cycling, or playing tennis or team sports. Discuss any changes in your exercise program with your doctor. · Do not smoke or allow others to smoke around you. If you need help quitting, talk to your doctor about stop-smoking programs and medicines. These can increase your chances of quitting for good. · Talk to your doctor about whether you have any risk factors for sexually transmitted infections (STIs). Having one sex partner (who does not have STIs and does not have sex with anyone else) is a good way to avoid these infections. · Use birth control if you do not want to have children at this time. Talk with your doctor about the choices available and what might be best for you. · Protect your skin from too much sun. When you're outdoors from 10 a.m. to 4 p.m., stay in the shade or cover up with clothing and a hat with a wide brim. Wear sunglasses that block UV rays. Even when it's cloudy, put broad-spectrum sunscreen (SPF 30 or higher) on any exposed skin. · See a dentist one or two times a year for checkups and to have your teeth cleaned. · Wear a seat belt in the car. Follow your doctor's advice about when to have certain tests. These tests can spot problems early. For everyone  · Cholesterol. Have the fat (cholesterol) in your blood tested after age 21.  Your doctor will tell you how often to have this done based on your age, family history, or other things that can increase your risk for heart disease. · Blood pressure. Have your blood pressure checked during a routine doctor visit. Your doctor will tell you how often to check your blood pressure based on your age, your blood pressure results, and other factors. · Vision. Talk with your doctor about how often to have a glaucoma test.  · Diabetes. Ask your doctor whether you should have tests for diabetes. · Colon cancer. Your risk for colorectal cancer gets higher as you get older. Some experts say that adults should start regular screening at age 48 and stop at age 76. Others say to start before age 48 or continue after age 76. Talk with your doctor about your risk and when to start and stop screening. For women  · Breast exam and mammogram. Talk to your doctor about when you should have a clinical breast exam and a mammogram. Medical experts differ on whether and how often women under 50 should have these tests. Your doctor can help you decide what is right for you. · Cervical cancer screening test and pelvic exam. Begin with a Pap test at age 24. The test often is part of a pelvic exam. Starting at age 27, you may choose to have a Pap test, an HPV test, or both tests at the same time (called co-testing). Talk with your doctor about how often to have testing. · Tests for sexually transmitted infections (STIs). Ask whether you should have tests for STIs. You may be at risk if you have sex with more than one person, especially if your partners do not wear condoms. For men  · Tests for sexually transmitted infections (STIs). Ask whether you should have tests for STIs. You may be at risk if you have sex with more than one person, especially if you do not wear a condom. · Testicular cancer exam. Ask your doctor whether you should check your testicles regularly. · Prostate exam. Talk to your doctor about whether you should have a blood test (called a PSA test) for prostate cancer. Experts differ on whether and when men should have this test. Some experts suggest it if you are older than 39 and are -American or have a father or brother who got prostate cancer when he was younger than 72. When should you call for help? Watch closely for changes in your health, and be sure to contact your doctor if you have any problems or symptoms that concern you. Where can you learn more? Go to http://piero-audrey.info/. Enter P072 in the search box to learn more about \"Well Visit, Ages 25 to 48: Care Instructions. \"  Current as of: December 13, 2018  Content Version: 12.2  © 5934-5161 [employer]. Care instructions adapted under license by [employer] (which disclaims liability or warranty for this information). If you have questions about a medical condition or this instruction, always ask your healthcare professional. [address] any warranty or liability for your use of this information.

## 2019-09-24 NOTE — PROGRESS NOTES
Subjective:   29 y.o. female for Well Woman Check. Her gyne and breast care is done elsewhere by her Ob-Gyne physician. She is 3 weeks post partum and is breastfeeding. Has noted itchy area on right cheek near nose that has been present since July; appeared after she had a sunburn in area. Has history of eczema but not typically on face. Has not applied anything to area. Her cholesterol has been slightly elevated in the past; she is not fasting today. Current Outpatient Medications   Medication Sig Dispense Refill    triamcinolone acetonide (KENALOG) 0.1 % topical cream Apply  to affected area two (2) times a day. use thin layer 15 g 0    Lactobacillus acidophilus (PROBIOTIC PO) Take  by mouth.  prenatal multivit-ca-min-fe-fa tab Take  by mouth. No Known Allergies  Past Medical History:   Diagnosis Date    Abnormal Papanicolaou smear of cervix     colposcopy, dr. Urbano Mcintosh Pap smear for cervical cancer screening 05/03/2017 neg HPV POS     Past Surgical History:   Procedure Laterality Date    HX HEENT      wisdom teeth extraction    HX HEENT      nasal fracture repair    HX OTHER SURGICAL  09/03/2019    vaginal delivery      Family History   Problem Relation Age of Onset    No Known Problems Father     Elevated Lipids Mother      Social History     Tobacco Use    Smoking status: Never Smoker    Smokeless tobacco: Never Used   Substance Use Topics    Alcohol use: Yes     Frequency: Never     Comment: occasional             ROS: Feeling generally well. No TIA's or unusual headaches, no dysphagia. No prolonged cough. No dyspnea or chest pain on exertion. No abdominal pain, change in bowel habits, black or bloody stools. No urinary tract symptoms. No new or unusual musculoskeletal symptoms. Specific concerns today: itchy rash. Objective: The patient appears well, alert, oriented x 3, in no distress.   Visit Vitals  /66 (BP 1 Location: Left arm, BP Patient Position: Sitting)   Pulse (!) 58   Temp 97.8 °F (36.6 °C) (Oral)   Resp 12   Ht 5' 9\" (1.753 m)   Wt 201 lb 3.2 oz (91.3 kg)   SpO2 98%   Breastfeeding? Yes   BMI 29.71 kg/m²     ENT normal.  Neck supple. No adenopathy or thyromegaly. ANTONIO. Lungs are clear, good air entry, no wheezes, rhonchi or rales. S1 and S2 normal, no murmurs, regular rate and rhythm. Abdomen soft without tenderness, guarding, mass or organomegaly. Extremities show no edema, normal peripheral pulses. Neurological is normal, no focal findings. Dime sized patch of scaling to right cheek c/w eczema. Breast and Pelvic exams are deferred. Assessment/Plan:   Well Woman  increase physical activity, routine labs ordered  Diagnoses and all orders for this visit:    1. Well woman exam (no gynecological exam)  -     CBC WITH AUTOMATED DIFF  -     METABOLIC PANEL, COMPREHENSIVE  -     LIPID PANEL  -     TSH 3RD GENERATION  -     T4, FREE    2. Eczema, unspecified type  -     triamcinolone acetonide (KENALOG) 0.1 % topical cream; Apply  to affected area two (2) times a day. use thin layer    3. Encounter for immunization  -     INFLUENZA VIRUS VAC Remberto Avila was counseled on age-appropriate/ guideline-based risk prevention behaviors and screening for a 29y.o. year old   female . We also discussed adjustments in screening based on family history if necessary. Printed instructions for preventative screening guidelines and healthy behaviors given to patient with after visit summary.       lab results and schedule of future lab studies reviewed with patient  reviewed diet, exercise and weight control  cardiovascular risk and specific lipid/LDL goals reviewed  reviewed medications and side effects in detail

## 2019-09-27 NOTE — L&D DELIVERY NOTE
Delivery Summary    Patient: Ginyn Figueroa MRN: [id number]  SSN: [id number]    YOB: 1985  Age: 29 y.o. Sex: female       Information for the patient's :  Randi Young [[id number]]       Labor Events:    Labor: No    Steroids: None   Cervical Ripening Date/Time:       Cervical Ripening Type: None   Antibiotics During Labor:     Rupture Identifier:      Rupture Date/Time:   7:32 PM   Rupture Type: AROM   Amniotic Fluid Volume: Scant    Amniotic Fluid Description: Clear    Amniotic Fluid Odor:      Induction: None       Induction Date/Time:        Indications for Induction:      Augmentation: None   Augmentation Date/Time:      Indications for Augmentation:     Labor complications: None       Additional complications:        Delivery Events:  Indications For Episiotomy:     Episiotomy: None   Perineal Laceration(s): 1st   Repaired:     Periurethral Laceration Location:      Repaired:     Labial Laceration Location:     Repaired:     Sulcal Laceration Location:     Repaired:     Vaginal Laceration Location:     Repaired:     Cervical Laceration Location:     Repaired:     Repair Suture: Rapide 3-0   Number of Repair Packets: 1   Estimated Blood Loss (ml):  ml     Delivery Date: 9/3/2019    Delivery Time: 7:39 PM  Delivery Type: Vaginal, Spontaneous  Sex:  Male    Gestational Age: 36w3d   Delivery Clinician:  Amie Laws  Living Status: Living   Delivery Location: L&D RM, 5          APGARS  One minute Five minutes Ten minutes   Skin color: 1   2        Heart rate: 2   2        Grimace: 2   2        Muscle tone: 2   2        Breathin   2        Totals: 9   10            Presentation: Vertex    Position:        Resuscitation Method:  Suctioning-bulb     Meconium Stained: None      Cord Information: 3 Vessels  Complications: None  Cord around:    Delayed cord clamping?  Yes  Cord clamped date/time:9/3/2019  7:44 PM  Disposition of Cord Blood: Discard    Blood Gases Sent?: No    Placenta:  Date/Time: 9/3/2019  7:48 PM  Removal: Spontaneous      Appearance: Normal     [place] Measurements:  Birth Weight: 7 lb 11.6 oz (3.505 kg)      Birth Length: 1' 8.5\" (0.521 m)      Head Circumference: 1' 1.78\" (0.35 m)      Chest Circumference:       Abdominal Girth: 1' 0.99\" (0.33 m)    Other Providers:   WALTER CHAMPION;SHERRILL GRIJALVA, Obstetrician;Primary Nurse;Primary [place] Nurse;;;;;;;           Group B Strep:   Lab Results   Component Value Date/Time    GrBStrep, External Positive 2019     Information for the patient's :  Nancie Castillo [[id number]]   No results found for: ABORH, PCTABR, PCTDIG, BILI, ABORHEXT, ABORH    No results for input(s): PCO2CB, PO2CB, HCO3I, SO2I, IBD, PTEMPI, SPECTI, PHICB, ISITE, IDEV, IALLEN in the last 72 hours.

## 2019-10-17 ENCOUNTER — OFFICE VISIT (OUTPATIENT)
Dept: OBGYN CLINIC | Age: 34
End: 2019-10-17

## 2019-10-17 VITALS
SYSTOLIC BLOOD PRESSURE: 130 MMHG | WEIGHT: 195 LBS | DIASTOLIC BLOOD PRESSURE: 94 MMHG | BODY MASS INDEX: 28.88 KG/M2 | HEIGHT: 69 IN

## 2019-10-17 NOTE — PROGRESS NOTES
Postpartum evaluation    Anitra Bell is a 29 y.o. female who presents for a postpartum exam.     She is now 6 weeks post normal spontaneous vaginal delivery. Her baby is doing well, Jesus Alberto. She has had return of menses. She has had the following significant problems since her delivery: none    The patient is breast feeding without difficulty. The patient would like to use natural family planning for birth control or VCF     She is currently taking: no medications. She is due for her next AE in 1 months. Visit Vitals  Ht 5' 9\" (1.753 m)   Wt 195 lb (88.5 kg)   LMP 10/14/2019   Breastfeeding?  Yes   BMI 28.80 kg/m²       PHYSICAL EXAMINATION    Constitutional  · Appearance: well-nourished, well developed, alert, in no acute distress    HENT  · Head and Face: appears normal    Gastrointestinal  · Abdominal Examination: abdomen non-tender to palpation, normal bowel sounds, no masses present  · Liver and spleen: no hepatomegaly present, spleen not palpable  · Hernias: no hernias identified    Genitourinary  · External Genitalia: normal appearance for age, no discharge present, no tenderness present, no inflammatory lesions present, no masses present, no atrophy present  · Vagina: normal vaginal vault without central or paravaginal defects, no discharge present, no inflammatory lesions present, no masses present  · Bladder: non-tender to palpation  · Urethra: appears normal  · Cervix: normal   · Uterus: normal size, shape and consistency  · Adnexa: no adnexal tenderness present, no adnexal masses present  · Perineum: perineum within normal limits, no evidence of trauma, no rashes or skin lesions present  · Anus: anus within normal limits, no hemorrhoids present  · Inguinal Lymph Nodes: no lymphadenopathy present    Skin  · General Inspection: no rash, no lesions identified    Neurologic/Psychiatric  · Mental Status:  · Orientation: grossly oriented to person, place and time  · Mood and Affect: mood normal, affect appropriate    Assessment:  Normal postpartum check    Plan:  RTO for AE.   VCF reviewed

## 2019-10-17 NOTE — PATIENT INSTRUCTIONS
Breastfeeding: Care Instructions  Overview    Breastfeeding has many benefits. It may lower your baby's chances of getting an infection. It also may make it less likely that your baby will have problems such as diabetes and obesity later in life. Breastfeeding also helps you bond with your baby. In the first days after birth, your breasts make a thick, yellow liquid called colostrum. This liquid gives your baby nutrients and antibodies against infection. It is all that babies need in the first days after birth. Your breasts will fill with milk a few days after the birth. Breastfeeding is a skill that gets better with practice. Be patient with yourself and your baby. If you have trouble, you can get help and keep breastfeeding. Follow-up care is a key part of your treatment and safety. Be sure to make and go to all appointments, and call your doctor if you are having problems. It's also a good idea to know your test results and keep a list of the medicines you take. How can you care for yourself at home? · Breastfeed your baby whenever he or she is hungry. In the first 2 weeks, your baby will feed about every 1 to 3 hours. That often works out to about 8 to 12 times in a 24-hour period. This will help you keep up your supply of milk. Signs that your baby is hungry include:  ? Sucking on his or her hands. ? Hector his or her lips. ? Turning his or her head toward your breast.  · Put a bed pillow or a nursing pillow on your lap to support your arms and your baby. · Hold your baby in a comfortable position. ? You can hold your baby in several ways. One of the most common positions is the cradle hold. One arm supports your baby, with his or her head in the bend of your elbow. Your open hand supports your baby's bottom or back. Your baby's belly lies against yours. ? If you had your baby by , or , try the football hold. This position keeps your baby off your belly.  Tuck your baby under your arm, with his or her body along the side you will be feeding on. Support your baby's upper body with your arm. With that hand you can control your baby's head to bring his or her mouth to your breast.  ? Try different positions with each feeding. If you are having problems, ask for help from your doctor or a lactation consultant. · To get your baby to latch on:  ? Support and narrow your breast with one hand using a \"U hold,\" with your thumb on the outer side of your breast and your fingers on the inner side. You can also use a \"C hold,\" with all your fingers below the nipple and your thumb above it. Try the different holds to get the deepest latch for whichever breastfeeding position you use. Your other arm is behind your baby's back, with your hand supporting the base of the baby's head. Position your fingers and thumb to point toward your baby's ears. ? You can touch your baby's lower lip with your nipple to get your baby to open his or her mouth. Wait until your baby opens up really wide, like a big yawn. Then be sure to bring the baby quickly to your breast--not your breast to the baby. As you bring your baby toward your breast, use your other hand to support the breast and guide it into his or her mouth. ? Both the nipple and a large portion of the darker area around the nipple (areola) should be in the baby's mouth. The baby's lips should be flared outward, not folded in (inverted). ? Listen for a regular sucking and swallowing pattern while the baby is feeding. If you cannot see or hear a swallowing pattern, watch the baby's ears, which will wiggle slightly when the baby swallows. If the baby's nose appears to be blocked by your breast, bring your baby's body closer to you. This will help tilt the baby's head back slightly, so just the edge of one nostril is clear for breathing. ?  When your baby is latched, you can usually remove your hand from supporting your breast and bring it under your baby to cradle him or her. Now just relax and breastfeed your baby. · You will know that your baby is feeding well when:  ? His or her mouth covers a lot of the areola, and the lips are flared out.  ? His or her chin and nose rest against your breast.  ? Sucking is deep and rhythmic, with short pauses. ? You are able to see and hear your baby swallowing. ? You do not feel pain in your nipple. · Offer both breasts to your baby at each feeding. Each time you breastfeed, switch which breast you start with. · Anytime you need to remove your baby from the breast, put one finger in the corner of his or her mouth. Push your finger between your baby's gums to gently break the seal. If you do not break the tight seal before you remove your baby, your nipples can become sore, cracked, or bruised. · After feeding your baby, gently pat his or her back to let out any swallowed air. After your baby burps, offer the breast again, or offer the other breast. Sometimes a baby will want to keep feeding after being burped. When should you call for help? Call your doctor now or seek immediate medical care if:    · You have symptoms of a breast infection, such as:  ? Increased pain, swelling, redness, or warmth around a breast.  ? Red streaks extending from the breast.  ? Pus draining from a breast.  ? A fever.     · Your baby has no wet diapers for 6 hours.    Watch closely for changes in your health, and be sure to contact your doctor if:    · Your baby has trouble latching on to your breast.     · You continue to have pain or discomfort when breastfeeding.     · You have other questions or concerns. Where can you learn more? Go to http://piero-audrey.info/. Enter P492 in the search box to learn more about \"Breastfeeding: Care Instructions. \"  Current as of: May 29, 2019  Content Version: 12.2  © 7849-7527 [employer].  Care instructions adapted under license by [employer] (which disclaims liability or warranty for this information). If you have questions about a medical condition or this instruction, always ask your healthcare professional. [place] [address] any warranty or liability for your use of this information.

## 2019-12-06 ENCOUNTER — OFFICE VISIT (OUTPATIENT)
Dept: INTERNAL MEDICINE CLINIC | Age: 34
End: 2019-12-06

## 2019-12-06 VITALS
HEIGHT: 69 IN | RESPIRATION RATE: 18 BRPM | WEIGHT: 188.38 LBS | BODY MASS INDEX: 27.9 KG/M2 | SYSTOLIC BLOOD PRESSURE: 111 MMHG | DIASTOLIC BLOOD PRESSURE: 76 MMHG | TEMPERATURE: 98.1 F | HEART RATE: 65 BPM | OXYGEN SATURATION: 96 %

## 2019-12-06 DIAGNOSIS — D72.829 LEUKOCYTOSIS, UNSPECIFIED TYPE: ICD-10-CM

## 2019-12-06 DIAGNOSIS — R10.11 RIGHT UPPER QUADRANT ABDOMINAL PAIN: Primary | ICD-10-CM

## 2019-12-06 RX ORDER — TRIAMCINOLONE ACETONIDE 1 MG/G
CREAM TOPICAL
Refills: 0 | COMMUNITY
Start: 2019-10-30 | End: 2019-12-31

## 2019-12-06 NOTE — PATIENT INSTRUCTIONS
Low-Fat Diet for Gallbladder Disease: Care Instructions Your Care Instructions When you eat, the gallbladder releases bile, which helps you digest the fat in food. If you have an inflamed gallbladder, this may cause pain. A low-fat diet may give your gallbladder a rest so you can start to heal. Your doctor and dietitian can help you make an eating plan that does not irritate your digestive system. Always talk with your doctor or dietitian before you make changes in your diet. Follow-up care is a key part of your treatment and safety. Be sure to make and go to all appointments, and call your doctor if you are having problems. It's also a good idea to know your test results and keep a list of the medicines you take. How can you care for yourself at home? · Eat many small meals and snacks each day instead of three large meals. · Choose lean meats. ? Eat no more than 5 to 6½ ounces of meat a day. ? Cut off all fat you can see. ? Eat chicken and turkey without the skin. ? Many types of fish, such as salmon, lake trout, tuna, and herring, provide healthy omega-3 fat. But, avoid fish canned in oil, such as sardines in olive oil. ? Bake, broil, or grill meats, poultry, or fish instead of frying them in butter or fat. · Drink or eat nonfat or low-fat milk, yogurt, cheese, or other milk products each day. ? Read the labels on cheeses, and choose those with less than 5 grams of fat an ounce. ? Try fat-free sour cream, cream cheese, or yogurt. ? Avoid cream soups and cream sauces on pasta. ? Eat low-fat ice cream, frozen yogurt, or sorbet. Avoid regular ice cream. 
· Eat whole-grain cereals, breads, crackers, rice, or pasta. Avoid high-fat foods such as croissants, scones, biscuits, waffles, doughnuts, muffins, granola, and high-fat breads. · Flavor your foods with herbs and spices (such as basil, tarragon, or mint), fat-free sauces, or lemon juice instead of butter.  You can also use butter substitutes, fat-free mayonnaise, or fat-free dressing. · Try applesauce, prune puree, or mashed bananas to replace some or all of the fat when you bake. · Limit fats and oils, such as butter, margarine, mayonnaise, and salad dressing, to no more than 1 tablespoon a meal. 
· Avoid high-fat foods, such as: 
? Chocolate, whole milk, ice cream, and processed cheese. ? Fried or buttered foods. ? Sausage, salami, and alexandra. ? Cinnamon rolls, cakes, pies, cookies, and other pastries. ? Prepared snack foods, such as potato chips, nut and granola bars, and mixed nuts. ? Coconut and avocado. · Learn how to read food labels for serving sizes and ingredients. Fast-food and convenience-food meals often have lots of fat. Where can you learn more? Go to http://piero-audrey.info/. Enter F178 in the search box to learn more about \"Low-Fat Diet for Gallbladder Disease: Care Instructions. \" Current as of: November 7, 2018 Content Version: 12.2 © 5037-6900 [employer], [employer]. Care instructions adapted under license by [employer] (which disclaims liability or warranty for this information). If you have questions about a medical condition or this instruction, always ask your healthcare professional. [place] [address] any warranty or liability for your use of this information.

## 2019-12-06 NOTE — PROGRESS NOTES
Chief Complaint   Patient presents with    Abdominal Pain       RUQ pain  Pt reprots RUQ pain which started first trimester. She did not appreciate as much in 2nd and third. She notes after delivery of son in 9/2019 she has had more progressive pain. RUQ frequency 4-5 times/week more noticeable after eating big meals. No vomitting but nausea with episodes. She also has a burning sensaton in her epigastric area radiatign to ruq. She is not taking anything over the counter. She is breastfeeding. Past Medical History:   Diagnosis Date    Abnormal Papanicolaou smear of cervix     colposcopy, dr. Jie Stanley Pap smear for cervical cancer screening 05/03/2017 neg HPV POS     Past Surgical History:   Procedure Laterality Date    HX HEENT      wisdom teeth extraction    HX HEENT      nasal fracture repair    HX OTHER SURGICAL  09/03/2019    vaginal delivery      Social History     Socioeconomic History    Marital status:      Spouse name: Not on file    Number of children: Not on file    Years of education: Not on file    Highest education level: Not on file   Tobacco Use    Smoking status: Never Smoker    Smokeless tobacco: Never Used   Substance and Sexual Activity    Alcohol use: Yes     Frequency: Never     Comment: occasional    Drug use: No    Sexual activity: Yes     Partners: Male     Birth control/protection: Rhythm   Other Topics Concern   Social History Narrative    Full time  for private school    Manageable stress              healthy        Son 2yo -day care will go to [employer] at [address] [place] [address]     Pt was home part time for 11 months     Family History   Problem Relation Age of Onset    No Known Problems Father     Elevated Lipids Mother      Current Outpatient Medications   Medication Sig Dispense Refill    Lactobacillus acidophilus (PROBIOTIC PO) Take  by mouth.       triamcinolone acetonide (KENALOG) 0.1 % topical cream APPLY AA BID USE A THIN LAYER  0    prenatal multivit-ca-min-fe-fa tab Take  by mouth. No Known Allergies    Review of Systems - General ROS: negative for - chills, fatigue, fever, hot flashes or malaise  Cardiovascular ROS: no chest pain or dyspnea on exertion  Respiratory ROS: no cough, shortness of breath, or wheezing    Visit Vitals  /76 (BP 1 Location: Left arm, BP Patient Position: Sitting)   Pulse 65   Temp 98.1 °F (36.7 °C) (Oral)   Resp 18   Ht 5' 9\" (1.753 m)   Wt 188 lb 6 oz (85.4 kg)   SpO2 96%   Breastfeeding Yes   BMI 27.82 kg/m²     General Appearance:  Well developed, well nourished,alert and oriented x 3, and individual in no acute distress. Ears/Nose/Mouth/Throat:   Hearing grossly normal.         Neck: Supple, no lad, no bruits   Chest:   Lungs clear to auscultation bilaterally. Cardiovascular:  Regular rate and rhythm, S1, S2 normal, no murmur. Abdomen:   Soft, non-tender, bowel sounds are active. Extremities: No edema bilaterally. Skin: Warm and dry, no suspicious lesions                 Diagnoses and all orders for this visit:    1. Right upper quadrant abdominal pain  I suspect patient has gallstones. We will monitor frequency and if needed we will send to general surgery. Low-fat diet discussed which she is already doing.  -     US ABD COMP; Future  -     METABOLIC PANEL, COMPREHENSIVE; Future    2. Leukocytosis, unspecified type  Patient with episodic leukocytosis  Clinically stable  -     METABOLIC PANEL, COMPREHENSIVE; Future  -     CBC WITH AUTOMATED DIFF;  Future

## 2019-12-09 ENCOUNTER — PATIENT MESSAGE (OUTPATIENT)
Dept: INTERNAL MEDICINE CLINIC | Age: 34
End: 2019-12-09

## 2019-12-09 DIAGNOSIS — Z12.83 SKIN EXAM, SCREENING FOR CANCER: Primary | ICD-10-CM

## 2019-12-11 NOTE — TELEPHONE ENCOUNTER
From: [facility]  To: Donnell Alonso MD  Sent: 12/9/2019 1:15 PM EST  Subject: Visit Follow-Up Question    Forgot to ask Dr. Zheng Martínez at my last visit but I would like to get a referral for a dermatologist. The spot on my face has not gone away and has been itchy and present since late summer.

## 2019-12-19 ENCOUNTER — OFFICE VISIT (OUTPATIENT)
Dept: OBGYN CLINIC | Age: 34
End: 2019-12-19

## 2019-12-19 VITALS — DIASTOLIC BLOOD PRESSURE: 86 MMHG | WEIGHT: 180.5 LBS | SYSTOLIC BLOOD PRESSURE: 128 MMHG | BODY MASS INDEX: 26.66 KG/M2

## 2019-12-19 DIAGNOSIS — Z01.419 WELL WOMAN EXAM WITH ROUTINE GYNECOLOGICAL EXAM: Primary | ICD-10-CM

## 2019-12-19 NOTE — PATIENT INSTRUCTIONS

## 2019-12-19 NOTE — PROGRESS NOTES
Annual exam ages 21-44    Alysa Page is a ,  36 y.o. female [facility] [place] [facility]'s last menstrual period was 2019. .    She presents for her annual checkup. She is having vaginal itching and irritation. Also reporting irregular spotty bleeding. Exclusively pumping and breastfeeding. No birth control. With regard to the Gardasil vaccine, she is older than the FDA approved age to receive it. Menstrual status:    Her periods are light in flow. She is using three to five pads or tampons per day, usually regular and last 26-30 days. She denies dysmenorrhea. She reports no premenstrual symptoms. Contraception:    The current method of family planning is rhythm method. Sexual history:     She  reports being sexually active and has had partner(s) who are Male. She reports using the following method of birth control/protection: Rhythm. .    Medical conditions:    Since her last annual GYN exam about one year ago, she has not the following changes in her health history: none. Pap and Mammogram History:    Her most recent Pap smear was normal, obtained 1 year(s) ago. Breast Cancer History/Substance Abuse: negative    Past Medical History:   Diagnosis Date    Abnormal Papanicolaou smear of cervix     colposcopy, dr. Adams Covert Pap smear for cervical cancer screening 2017 neg HPV POS     Past Surgical History:   Procedure Laterality Date    HX HEENT      wisdom teeth extraction    HX HEENT      nasal fracture repair    HX OTHER SURGICAL  2019    vaginal delivery        Current Outpatient Medications   Medication Sig Dispense Refill    triamcinolone acetonide (KENALOG) 0.1 % topical cream APPLY AA BID USE A THIN LAYER  0    Lactobacillus acidophilus (PROBIOTIC PO) Take  by mouth.  prenatal multivit-ca-min-fe-fa tab Take  by mouth. Allergies: Patient has no known allergies.      Tobacco History:  reports that she has never smoked. She has never used smokeless tobacco.  Alcohol Abuse:  reports current alcohol use. Drug Abuse:  reports no history of drug use.     Family Medical/Cancer History:   Family History   Problem Relation Age of Onset    No Known Problems Father     Elevated Lipids Mother         Review of Systems - History obtained from the patient  Constitutional: negative for weight loss, fever, night sweats  HEENT: negative for hearing loss, earache, congestion, snoring, sorethroat  CV: negative for chest pain, palpitations, edema  Resp: negative for cough, shortness of breath, wheezing  GI: negative for change in bowel habits, abdominal pain, black or bloody stools  : negative for frequency, dysuria, hematuria, vaginal discharge  MSK: negative for back pain, joint pain, muscle pain  Breast: negative for breast lumps, nipple discharge, galactorrhea  Skin :negative for itching, rash, hives  Neuro: negative for dizziness, headache, confusion, weakness  Psych: negative for anxiety, depression, change in mood  Heme/lymph: negative for bleeding, bruising, pallor    Physical Exam    Visit Vitals  Wt 180 lb 8 oz (81.9 kg)   LMP 12/05/2019   Breastfeeding Yes   BMI 26.66 kg/m²       Constitutional  · Appearance: well-nourished, well developed, alert, in no acute distress    HENT  · Head and Face: appears normal    Chest  · Respiratory Effort: breathing unlabored    Breasts  · Inspection of Breasts: breasts symmetrical, no skin changes, no discharge present, nipple appearance normal, no skin retraction present  · Palpation of Breasts and Axillae: no masses present on palpation, no breast tenderness  · Axillary Lymph Nodes: no lymphadenopathy present    Gastrointestinal  · Abdominal Examination: abdomen non-tender to palpation, normal bowel sounds, no masses present  · Liver and spleen: no hepatomegaly present, spleen not palpable  · Hernias: no hernias identified    Genitourinary  · External Genitalia: normal appearance for age, no discharge present, no tenderness present, no inflammatory lesions present, no masses present, no atrophy present  · Vagina: normal vaginal vault without central or paravaginal defects, no discharge present, no inflammatory lesions present, no masses present  · Bladder: non-tender to palpation  · Urethra: appears normal  · Cervix: normal   · Uterus: normal size, shape and consistency  · Adnexa: no adnexal tenderness present, no adnexal masses present  · Perineum: perineum within normal limits, no evidence of trauma, no rashes or skin lesions present  · Anus: anus within normal limits, no hemorrhoids present  · Inguinal Lymph Nodes: no lymphadenopathy present    Skin  · General Inspection: no rash, no lesions identified    Neurologic/Psychiatric  · Mental Status:  · Orientation: grossly oriented to person, place and time  · Mood and Affect: mood normal, affect appropriate    . Assessment:  Routine gynecologic examination  Her current medical status is satisfactory with no evidence of significant gynecologic issues.     Plan:  GC/ chlamydia offered and declined  Pap not done today due to menses  Counseled re: diet, exercise, healthy lifestyle  Return for yearly wellness visits  Gardisil counseling provided  Rec screening mammo at either 36 or 41

## 2019-12-20 ENCOUNTER — HOSPITAL ENCOUNTER (OUTPATIENT)
Dept: ULTRASOUND IMAGING | Age: 34
Discharge: HOME | End: 2019-12-20
Attending: INTERNAL MEDICINE
Payer: COMMERCIAL

## 2019-12-20 DIAGNOSIS — R10.11 RIGHT UPPER QUADRANT ABDOMINAL PAIN: ICD-10-CM

## 2019-12-20 PROCEDURE — 76705 ECHO EXAM OF ABDOMEN: CPT

## 2019-12-21 LAB
ALBUMIN SERPL-MCNC: 4.4 G/DL (ref 3.5–5.5)
ALBUMIN/GLOB SERPL: 1.6 {RATIO} (ref 1.2–2.2)
ALP SERPL-CCNC: 75 IU/L (ref 39–117)
ALT SERPL-CCNC: 16 IU/L (ref 0–32)
AST SERPL-CCNC: 12 IU/L (ref 0–40)
BASOPHILS # BLD AUTO: 0 X10E3/UL (ref 0–0.2)
BASOPHILS NFR BLD AUTO: 0 %
BILIRUB SERPL-MCNC: 0.7 MG/DL (ref 0–1.2)
BUN SERPL-MCNC: 16 MG/DL (ref 6–20)
BUN/CREAT SERPL: 20 (ref 9–23)
CALCIUM SERPL-MCNC: 9.2 MG/DL (ref 8.7–10.2)
CHLORIDE SERPL-SCNC: 103 MMOL/L (ref 96–106)
CHOLEST SERPL-MCNC: 180 MG/DL (ref 100–199)
CO2 SERPL-SCNC: 25 MMOL/L (ref 20–29)
CREAT SERPL-MCNC: 0.79 MG/DL (ref 0.57–1)
EOSINOPHIL # BLD AUTO: 0.1 X10E3/UL (ref 0–0.4)
EOSINOPHIL NFR BLD AUTO: 2 %
ERYTHROCYTE [DISTWIDTH] IN BLOOD BY AUTOMATED COUNT: 12.9 % (ref 12.3–15.4)
GLOBULIN SER CALC-MCNC: 2.8 G/DL (ref 1.5–4.5)
GLUCOSE SERPL-MCNC: 82 MG/DL (ref 65–99)
HCT VFR BLD AUTO: 41.9 % (ref 34–46.6)
HDLC SERPL-MCNC: 50 MG/DL
HGB BLD-MCNC: 13.9 G/DL (ref 11.1–15.9)
IMM GRANULOCYTES # BLD AUTO: 0 X10E3/UL (ref 0–0.1)
IMM GRANULOCYTES NFR BLD AUTO: 0 %
INTERPRETATION, 910389: NORMAL
LDLC SERPL CALC-MCNC: 120 MG/DL (ref 0–99)
LYMPHOCYTES # BLD AUTO: 2.3 X10E3/UL (ref 0.7–3.1)
LYMPHOCYTES NFR BLD AUTO: 44 %
MCH RBC QN AUTO: 28.5 PG (ref 26.6–33)
MCHC RBC AUTO-ENTMCNC: 33.2 G/DL (ref 31.5–35.7)
MCV RBC AUTO: 86 FL (ref 79–97)
MONOCYTES # BLD AUTO: 0.4 X10E3/UL (ref 0.1–0.9)
MONOCYTES NFR BLD AUTO: 7 %
NEUTROPHILS # BLD AUTO: 2.4 X10E3/UL (ref 1.4–7)
NEUTROPHILS NFR BLD AUTO: 47 %
PLATELET # BLD AUTO: 339 X10E3/UL (ref 150–450)
POTASSIUM SERPL-SCNC: 4.3 MMOL/L (ref 3.5–5.2)
PROT SERPL-MCNC: 7.2 G/DL (ref 6–8.5)
RBC # BLD AUTO: 4.88 X10E6/UL (ref 3.77–5.28)
SODIUM SERPL-SCNC: 139 MMOL/L (ref 134–144)
T4 FREE SERPL-MCNC: 1.38 NG/DL (ref 0.82–1.77)
TRIGL SERPL-MCNC: 50 MG/DL (ref 0–149)
TSH SERPL DL<=0.005 MIU/L-ACNC: 1.12 UIU/ML (ref 0.45–4.5)
VLDLC SERPL CALC-MCNC: 10 MG/DL (ref 5–40)
WBC # BLD AUTO: 5.3 X10E3/UL (ref 3.4–10.8)

## 2019-12-31 ENCOUNTER — OFFICE VISIT (OUTPATIENT)
Dept: OBGYN CLINIC | Age: 34
End: 2019-12-31

## 2019-12-31 VITALS
BODY MASS INDEX: 27.11 KG/M2 | WEIGHT: 183 LBS | HEIGHT: 69 IN | SYSTOLIC BLOOD PRESSURE: 116 MMHG | DIASTOLIC BLOOD PRESSURE: 76 MMHG

## 2019-12-31 DIAGNOSIS — Z01.419 WELL WOMAN EXAM: Primary | ICD-10-CM

## 2019-12-31 NOTE — PROGRESS NOTES
Carlitos Sears is a 36 y.o. female who presents for pap smear only. On her menses at her recent annual visit. Her current method of family planning is rhythm method. The patient is sexually active. Her relevant past medical history:   Past Medical History:   Diagnosis Date    Abnormal Papanicolaou smear of cervix     colposcopy, dr. Lida Garcia Pap smear for cervical cancer screening 05/03/2017 neg HPV POS        Past Surgical History:   Procedure Laterality Date    HX HEENT      wisdom teeth extraction    HX HEENT      nasal fracture repair    HX OTHER SURGICAL  09/03/2019    vaginal delivery      Social History     Occupational History    Not on file   Tobacco Use    Smoking status: Never Smoker    Smokeless tobacco: Never Used   Substance and Sexual Activity    Alcohol use: Yes     Frequency: Never     Comment: occasional    Drug use: No    Sexual activity: Yes     Partners: Male     Birth control/protection: Rhythm     Family History   Problem Relation Age of Onset    No Known Problems Father     Elevated Lipids Mother        No Known Allergies  Prior to Admission medications    Medication Sig Start Date End Date Taking? Authorizing Provider   Lactobacillus acidophilus (PROBIOTIC PO) Take  by mouth. Yes Provider, Historical   prenatal multivit-ca-min-fe-fa tab Take  by mouth.    Yes Provider, Historical   triamcinolone acetonide (KENALOG) 0.1 % topical cream APPLY AA BID USE A THIN LAYER 10/30/19   Provider, Historical        Review of Systems - History obtained from the patient  Constitutional: negative for weight loss, fever, night sweats  HEENT: negative for hearing loss, earache, congestion, snoring, sorethroat  CV: negative for chest pain, palpitations, edema  Resp: negative for cough, shortness of breath, wheezing  Breast: negative for breast lumps, nipple discharge, galactorrhea  GI: negative for change in bowel habits, abdominal pain, black or bloody stools  : negative for frequency, dysuria, hematuria  MSK: negative for back pain, joint pain, muscle pain  Skin: negative for itching, rash, hives  Neuro: negative for dizziness, headache, confusion, weakness  Psych: negative for anxiety, depression, change in mood  Heme/lymph: negative for bleeding, bruising, pallor      Objective:  Visit Vitals  /76 (BP 1 Location: Left arm, BP Patient Position: Sitting)   Ht 5' 9\" (1.753 m)   Wt 183 lb (83 kg)   LMP 12/05/2019   BMI 27.02 kg/m²          PHYSICAL EXAMINATION    Constitutional  · Appearance: well-nourished, well developed, alert, in no acute distress    HENT  · Head and Face: appears normal    Neck  · Inspection/Palpation: normal appearance, no masses or tenderness  · Lymph Nodes: no lymphadenopathy present  · Thyroid: gland size normal, nontender, no nodules or masses present on palpation  ·   Breasts  · Inspection of Breasts: breasts symmetrical, no skin changes, no discharge present, nipple appearance normal, no skin retraction present  · Palpation of Breasts and Axillae: no masses present on palpation, no breast tenderness  · Axillary Lymph Nodes: no lymphadenopathy present    Gastrointestinal  · Abdominal Examination: abdomen non-tender to palpation, normal bowel sounds, no masses present  · Liver and spleen: no hepatomegaly present, spleen not palpable  · Hernias: no hernias identified    Genitourinary  · External Genitalia: normal appearance for age, no discharge present, no tenderness present, no inflammatory lesions present, no masses present, no atrophy present  · Vagina: normal vaginal vault without central or paravaginal defects, no discharge present, no inflammatory lesions present, no masses present  · Bladder: non-tender to palpation  · Urethra: appears normal  · Cervix: normal   · Uterus: normal size, shape and consistency  · Adnexa: no adnexal tenderness present, no adnexal masses present  · Perineum: perineum within normal limits, no evidence of trauma, no rashes or skin lesions present  · Anus: anus within normal limits, no hemorrhoids present  · Inguinal Lymph Nodes: no lymphadenopathy present    Skin  · General Inspection: no rash, no lesions identified    Neurologic/Psychiatric  · Mental Status:  · Orientation: grossly oriented to person, place and time  · Mood and Affect: mood normal, affect appropriate    Assessment:    pap to complete annual    Plan:   Pap sent

## 2019-12-31 NOTE — PATIENT INSTRUCTIONS

## 2020-01-03 LAB
CYTOLOGIST CVX/VAG CYTO: NORMAL
CYTOLOGY CVX/VAG DOC CYTO: NORMAL
CYTOLOGY CVX/VAG DOC THIN PREP: NORMAL
DX ICD CODE: NORMAL
LABCORP, 190119: NORMAL
Lab: NORMAL
OTHER STN SPEC: NORMAL
STAT OF ADQ CVX/VAG CYTO-IMP: NORMAL

## 2020-03-09 ENCOUNTER — TELEPHONE (OUTPATIENT)
Dept: INTERNAL MEDICINE CLINIC | Age: 35
End: 2020-03-09

## 2020-03-09 ENCOUNTER — PATIENT MESSAGE (OUTPATIENT)
Dept: INTERNAL MEDICINE CLINIC | Age: 35
End: 2020-03-09

## 2020-03-09 RX ORDER — NITROFURANTOIN 25; 75 MG/1; MG/1
100 CAPSULE ORAL 2 TIMES DAILY
Qty: 10 CAP | Refills: 0 | Status: SHIPPED | OUTPATIENT
Start: 2020-03-09 | End: 2020-08-17

## 2020-04-04 NOTE — TELEPHONE ENCOUNTER
Attempted to reach pt, no answer, left voicemail requesting return call
Dr. Thomas Mendiola: Today   Message Contents   Rikki Laguna sent to [email or web address]  Phone Number: [phone]         Caller's first and last name: n/a   Reason for call: Patient is following up on an antibiotic request that she did on MyChart.    Callback required yes/no and why: yes   Best contact number(s): [phone]   Details to clarify the request: n/a
Please let pt know an abx was sent in to her pharmacy. In the future if she is having sx (as noted in the message)  please have her make an appt so we can get a culture to ensure abx was correct.  Thanks,tania
Pt is c/o Right side mid back pain, urinary urgency and frequency on/off x 1 week. Temp 99.5 today. Leaving to go out of town tomorrow, will return back on Friday. She would like PCP to send in an abx before she leaves town. She asked that PCP reply via [facility].
Dr. Russell
md meredith mustafa

## 2020-07-08 ENCOUNTER — OFFICE VISIT (OUTPATIENT)
Dept: OBGYN CLINIC | Age: 35
End: 2020-07-08

## 2020-07-08 VITALS — BODY MASS INDEX: 25.25 KG/M2 | HEIGHT: 69 IN | WEIGHT: 170.5 LBS

## 2020-07-08 DIAGNOSIS — Z34.81 PRENATAL CARE, SUBSEQUENT PREGNANCY IN FIRST TRIMESTER: Primary | ICD-10-CM

## 2020-07-08 DIAGNOSIS — Z34.80 PRENATAL CARE OF MULTIGRAVIDA, ANTEPARTUM: ICD-10-CM

## 2020-07-08 PROBLEM — Z34.90 PREGNANCY: Status: RESOLVED | Noted: 2019-09-03 | Resolved: 2020-07-08

## 2020-07-08 LAB
CHLAMYDIA, EXTERNAL: NEGATIVE
N. GONORRHEA, EXTERNAL: NEGATIVE
URINALYSIS, EXTERNAL: NEGATIVE
URINALYSIS, EXTERNAL: NORMAL

## 2020-07-08 NOTE — PROGRESS NOTES
Current pregnancy history:    Kassi Keating is a  36 y.o. female [facility] Patient's last menstrual period was 2020 (exact date). .  She presents for the evaluation of amenorrhea and a positive pregnancy test.    LMP history:  The date of her LMP is 65 certain. Her last menstrual period was normal.  A urine pregnancy test was positive      Based on her LMP, her EDC is 21 and her EGA is 10 weeks,1 days. Her menstrual cycles are regular and occur approximately every 28 days  and range from 3 to 5 days. Ultrasound data:  She had an  ultrasound done by the ultrasound tech today which revealed a viable johnson pregnancy with a gestational age of 9 weeks and 2 days giving an EDC of 21. Pregnancy symptoms:    Since her LMP she has experienced  urinary frequency, breast tenderness, and nausea. She has not been vomiting over the last few weeks. Associated signs and symptoms which she denies: dysuria, discharge, vaginal bleeding. Relevant past pregnancy history:   She has the following pregnancy history: Her last pregnancy was uncomplicated. She has no history of  delivery. Relevant past medical history:(relevant to this pregnancy): noncontributory. Pap/Occupational history:  Last pap smear: last year Results: Normal            Substance history: negative for alcohol, tobacco and street drugs. Positive for nothing. Exposure history: There is/are no indoor cat/s in the home. The patient was instructed to not change the cat litter. She admits close contact with children on a regular basis. She has had chicken pox or the vaccine in the past.   Patient denies issues with domestic violence. Genetic Screening/Teratology Counseling: (Includes patient, baby's father, or anyone in either family with:)  3.  Patient's age >/= 28 at EDC?--no  2.   [facility] ([facility] [place] [address], or  background): MCV<80?--no.     3.  Neural tube defect (meningomyelocele, spina bifida, anencephaly)?--no.   4.  Congenital heart defect?--no.  5.  Down syndrome?--no.   6.  Wade-Sachs (Spiritism, [place])?--no.   7.  Canavan's Disease?--no.   8.  Familial Dysautonomia?--no.   9.  Sickle cell disease or trait ()? --no   The patient has not been tested for sickle trait  10. Hemophilia or other blood disorders?--no. 11.  Muscular dystrophy?--no. 12.  Cystic fibrosis?--no. 13.  Cotton's Chorea?--no. 14.  Mental retardation/autism (if yes was person tested for Fragile X)?--no. 15.  Other inherited genetic or chromosomal disorder?--no. 12.  Maternal metabolic disorder (DM, PKU, etc)?--no. 17.  Patient or FOB with a child with a birth defect not listed above?--no.  17a. Patient or FOB with a birth defect themselves?--no. 18.  Recurrent pregnancy loss, or stillbirth?--no. 19.  Any medications since LMP other than prenatal vitamins (include vitamins, supplements, OTC meds, drugs, alcohol)?--no. 20.  Any other genetic/environmental exposure to discuss?--no. Infection History:  1. Lives with someone with TB or TB exposed?--no.   2.  Patient or partner has history of genital herpes?--no.  3.  Rash or viral illness since LMP?--no.    4.  History of STD (GC, CT, HPV, syphilis, HIV)? --no   5. Other: OTHER?       Past Medical History:   Diagnosis Date    Abnormal Papanicolaou smear of cervix     colposcopy, dr. Rosemarie Freedman Pap smear for cervical cancer screening 05/03/2017 neg HPV POS     Past Surgical History:   Procedure Laterality Date    HX HEENT      wisdom teeth extraction    HX HEENT      nasal fracture repair    HX OTHER SURGICAL  09/03/2019    vaginal delivery      Social History     Occupational History    Not on file   Tobacco Use    Smoking status: Never Smoker    Smokeless tobacco: Never Used   Substance and Sexual Activity    Alcohol use: Not Currently     Frequency: Never     Comment: occasional    Drug use: No    Sexual activity: Yes     Partners: Male     Family History   Problem Relation Age of Onset    No Known Problems Father     Elevated Lipids Mother      OB History    Para Term  AB Living   4 2 2   1 2   SAB TAB Ectopic Molar Multiple Live Births   1       0 2      # Outcome Date GA Lbr Akshat/2nd Weight Sex Delivery Anes PTL Lv   4 Current            3 Term 19 39w1d 07:12 / 00:27 7 lb 11.6 oz (3.505 kg) M Vag-Spont EPI N TREY   2 Term 16 39w1d  7 lb (3.175 kg) M Vag-Spont EPIDURAL AN N TREY   1 SAB              No Known Allergies  Prior to Admission medications    Medication Sig Start Date End Date Taking? Authorizing Provider   prenatal multivit-ca-min-fe-fa tab Take  by mouth. Yes Provider, Historical   nitrofurantoin, macrocrystal-monohydrate, (MACROBID) 100 mg capsule Take 1 Cap by mouth two (2) times a day. 3/9/20   Ulysses Soja, MD   Lactobacillus acidophilus (PROBIOTIC PO) Take  by mouth.     Provider, Historical        Review of Systems: History obtained from the patient  Constitutional: negative for weight loss, fever, night sweats  HEENT: negative for hearing loss, earache, congestion, snoring, sore throat  CV: negative for chest pain, palpitations, edema  Resp: negative for cough, shortness of breath, wheezing  Breast: negative for breast lumps, nipple discharge, galactorrhea  GI: negative for change in bowel habits, abdominal pain, black or bloody stools  : negative for frequency, dysuria, hematuria, vaginal discharge  MSK: negative for back pain, joint pain, muscle pain  Skin: negative for itching, rash, hives  Neuro: negative for dizziness, headache, confusion, weakness  Psych: negative for anxiety, depression, change in mood  Heme/lymph: negative for bleeding, bruising, pallor    Objective:  Visit Vitals  Ht 5' 9\" (1.753 m)   Wt 170 lb 8 oz (77.3 kg)   LMP 2020 (Exact Date)   BMI 25.18 kg/m²       Physical Exam: Constitutional  · Appearance: well-nourished, well developed, alert, in no acute distress    HENT  · Head  · Face: appears normal  · Eyes: appear normal  · Ears: normal  · Mouth: normal  · Lips: no lesions      Chest  · Respiratory Effort: breathing unlabored     Cardiovascular  · Heart:  · Auscultation: regular rate and rhythm without murmur      Gastrointestinal  · Abdominal Examination: abdomen non-tender to palpation, normal bowel sounds, no masses present  · Liver and spleen: no hepatomegaly present, spleen not palpable  · Hernias: no hernias identified    Genitourinary  · deferred    Skin  · General Inspection: no rash, no lesions identified    Neurologic/Psychiatric  · Mental Status:  · Orientation: grossly oriented to person, place and time  · Mood and Affect: mood normal, affect appropriate    Assessment:   Intrauterine pregnancy with issues addressed in problem list  Plan:   Patient declines presence of chaperone during today's visit. Offered CF testing, CVS, Nuchal Translucency, MSAFP, amnio, and discussed NIPT  Course of pregnancy discussed including visit schedule, routine U/S, glucola testing, etc.  Avoid alcoholic beverages and illicit/recreational drugs use  Take prenatal vitamins or folic acid daily. Hospital and practice style discussed with coverage system. Discussed nutrition, toxoplasmosis precautions, sexual activity, exercise, need for influenza vaccine, environmental and work hazards, travel advice, screen for domestic violence, need for seat belts. Discussed seafood, unpasteurized dairy products, deli meat, artificial sweeteners, and caffeine. Discussed current prescription drug use. Given medication list.  Discussed the use of over the counter medications and chemicals. Route of delivery discussed, including risks, benefits     Handouts given to pt.

## 2020-07-08 NOTE — PATIENT INSTRUCTIONS
Weeks 10 to 14 of Your Pregnancy: Care Instructions  Your Care Instructions    By weeks 10 to 14 of your pregnancy, the placenta has formed inside your uterus. It is possible to hear your baby's heartbeat with a special ultrasound device. Your baby's eyes can and do move. The arms and legs can bend. This is a good time to think about testing for birth defects. There are two types of tests: screening and diagnostic. Screening tests show the chance that a baby has a certain birth defect. They can't tell you for sure that your baby has a problem. Diagnostic tests show if a baby has a certain birth defect. It's your choice whether to have these tests. You and your partner can talk to your doctor or midwife about birth defects tests. Follow-up care is a key part of your treatment and safety. Be sure to make and go to all appointments, and call your doctor if you are having problems. It's also a good idea to know your test results and keep a list of the medicines you take. How can you care for yourself at home? Decide about tests  · You can have screening tests and diagnostic tests to check for birth defects. The decision to have a test for birth defects is personal. Think about your age, your chance of passing on a family disease, your need to know about any problems, and what you might do after you have the test results. ? Triple or quadruple (quad) blood tests. These screening tests can be done between 15 and 20 weeks of pregnancy. They check the amounts of three or four substances in your blood. The doctor looks at these test results, along with your age and other factors, to find out the chance that your baby may have certain problems. ? Amniocentesis. This diagnostic test is used to look for chromosomal problems in the baby's cells.  It can be done between 15 and 20 weeks of pregnancy, usually around week 16.  ? Nuchal translucency test. This test uses ultrasound to measure the thickness of the area at the back of the baby's neck. An increase in the thickness can be an early sign of Down syndrome. ? Chorionic villus sampling (CVS). This is a test that looks for certain genetic problems with your baby. The same genes that are in your baby are in the placenta. A small piece of the placenta is taken out and tested. This test is done when you are 10 to 13 weeks pregnant. Ease discomfort  · Slow down and take naps when you feel tired. · If your emotions swing, talk to someone. Crying, anxiety, and concentration problems are common. · If your gums bleed, try a softer toothbrush. If your gums are puffy and bleed a lot, see your dentist.  · If you feel dizzy:  ? Get up slowly after sitting or lying down. ? Drink plenty of fluids. ? Eat small snacks to keep your blood sugar stable. ? Put your head between your legs as though you were tying your shoelaces. ? Lie down with your legs higher than your head. Use pillows to prop up your feet. · If you have a headache:  ? Lie down. ? Ask your partner or a good friend for a neck massage. ? Try cool cloths over your forehead or across the back of your neck. ? Use acetaminophen (Tylenol) for pain relief. Do not use nonsteroidal anti-inflammatory drugs (NSAIDs), such as ibuprofen (Advil, Motrin) or naproxen (Aleve), unless your doctor says it is okay. · If you have a nosebleed, pinch your nose gently, and hold it for a short while. To prevent nosebleeds, try massaging a small dab of petroleum jelly, such as Vaseline, in your nostrils. · If your nose is stuffed up, try saline (saltwater) nose sprays. Do not use decongestant sprays. Care for your breasts  · Wear a bra that gives you good support. · Know that changes in your breasts are normal.  ? Your breasts may get larger and more tender. Tenderness usually gets better by 12 weeks. ? Your nipples may get darker and larger, and small bumps around your nipples may show more. ?  The veins in your chest and breasts may show more. · Don't worry about \"toughening'\" your nipples. Breastfeeding will naturally do this. Where can you learn more? Go to http://piero-audrey.info/  Enter X412 in the search box to learn more about \"Weeks 10 to 14 of Your Pregnancy: Care Instructions. \"  Current as of: May 29, 2019Content Version: 12.4  © 4368-5878 Healthwise, Incorporated. Care instructions adapted under license by [employer] (which disclaims liability or warranty for this information). If you have questions about a medical condition or this instruction, always ask your healthcare professional. [address] any warranty or liability for your use of this information.

## 2020-07-10 LAB
C TRACH RRNA SPEC QL NAA+PROBE: NEGATIVE
N GONORRHOEA RRNA SPEC QL NAA+PROBE: NEGATIVE

## 2020-07-11 LAB
BACTERIA UR CULT: ABNORMAL
BACTERIA UR CULT: ABNORMAL
C TRACH RRNA SPEC QL NAA+PROBE: NORMAL
N GONORRHOEA RRNA SPEC QL NAA+PROBE: NORMAL

## 2020-07-13 ENCOUNTER — PATIENT MESSAGE (OUTPATIENT)
Dept: OBGYN CLINIC | Age: 35
End: 2020-07-13

## 2020-07-13 DIAGNOSIS — Z34.80 PRENATAL CARE OF MULTIGRAVIDA, ANTEPARTUM: ICD-10-CM

## 2020-07-13 DIAGNOSIS — R82.71 GBS BACTERIURIA: Primary | ICD-10-CM

## 2020-07-14 RX ORDER — AMOXICILLIN 500 MG/1
500 CAPSULE ORAL 2 TIMES DAILY
Qty: 14 CAP | Refills: 0 | Status: SHIPPED | OUTPATIENT
Start: 2020-07-14 | End: 2020-07-21

## 2020-07-14 NOTE — TELEPHONE ENCOUNTER
From: Adeline Kyle RN  To: Kassi Keating  Sent: 7/13/2020 3:13 PM EDT  Subject: Lab results    Rolf Hussein,    Your urine results from your last visit show evidence of a urinary tract infection that needs treatment with Amoxicillin. Where you would like us to send your prescription? Thanks!   Ricci Caraballo

## 2020-08-17 ENCOUNTER — ROUTINE PRENATAL (OUTPATIENT)
Dept: OBGYN CLINIC | Age: 35
End: 2020-08-17
Payer: COMMERCIAL

## 2020-08-17 VITALS
HEIGHT: 69 IN | DIASTOLIC BLOOD PRESSURE: 80 MMHG | WEIGHT: 175 LBS | SYSTOLIC BLOOD PRESSURE: 120 MMHG | BODY MASS INDEX: 25.92 KG/M2

## 2020-08-17 DIAGNOSIS — Z34.82 PRENATAL CARE, SUBSEQUENT PREGNANCY IN SECOND TRIMESTER: Primary | ICD-10-CM

## 2020-08-17 DIAGNOSIS — Z34.80 PRENATAL CARE OF MULTIGRAVIDA, ANTEPARTUM: ICD-10-CM

## 2020-08-17 PROBLEM — Z34.81 PRENATAL CARE, SUBSEQUENT PREGNANCY IN FIRST TRIMESTER: Status: RESOLVED | Noted: 2019-02-05 | Resolved: 2020-08-17

## 2020-08-17 PROCEDURE — 0502F SUBSEQUENT PRENATAL CARE: CPT | Performed by: OBSTETRICS & GYNECOLOGY

## 2020-08-17 NOTE — PROGRESS NOTES
small lump in left breast has reappeared; was present in first pregnancy 4 years ago; had negative imagining at that time; thorough exam today reassuring; offered US if more discrete at any point; doing great

## 2020-08-17 NOTE — PATIENT INSTRUCTIONS

## 2020-09-22 ENCOUNTER — ROUTINE PRENATAL (OUTPATIENT)
Dept: OBGYN CLINIC | Age: 35
End: 2020-09-22
Payer: COMMERCIAL

## 2020-09-22 VITALS
HEIGHT: 69 IN | WEIGHT: 182 LBS | DIASTOLIC BLOOD PRESSURE: 70 MMHG | BODY MASS INDEX: 26.96 KG/M2 | SYSTOLIC BLOOD PRESSURE: 116 MMHG

## 2020-09-22 DIAGNOSIS — Z34.80 PRENATAL CARE OF MULTIGRAVIDA, ANTEPARTUM: ICD-10-CM

## 2020-09-22 DIAGNOSIS — Z34.82 PRENATAL CARE, SUBSEQUENT PREGNANCY IN SECOND TRIMESTER: Primary | ICD-10-CM

## 2020-09-22 PROCEDURE — 0502F SUBSEQUENT PRENATAL CARE: CPT | Performed by: OBSTETRICS & GYNECOLOGY

## 2020-09-22 PROCEDURE — 76805 OB US >/= 14 WKS SNGL FETUS: CPT | Performed by: OBSTETRICS & GYNECOLOGY

## 2020-09-22 NOTE — PATIENT INSTRUCTIONS
Weeks 22 to 26 of Your Pregnancy: Care Instructions Your Care Instructions As you enter your 7th month of pregnancy at week 26, your baby's lungs are growing stronger and getting ready to breathe. You may notice that your baby responds to the sound of your or your partner's voice. You may also notice that your baby does less turning and twisting and more squirming or jerking. Jerking often means that your baby has the hiccups. Hiccups are perfectly normal and are only temporary. You may want to think about attending a childbirth preparation class. This is also a good time to start thinking about whether you want to have pain medicine during labor. Most pregnant women are tested for gestational diabetes between weeks 25 and 28. Gestational diabetes occurs when your blood sugar level gets too high when you're pregnant. The test is important, because you can have gestational diabetes and not know it. But the condition can cause problems for your baby. Follow-up care is a key part of your treatment and safety. Be sure to make and go to all appointments, and call your doctor if you are having problems. It's also a good idea to know your test results and keep a list of the medicines you take. How can you care for yourself at home? Ease discomfort from your baby's kicking · Change your position. Sometimes this will cause your baby to change position too. · Take a deep breath while you raise your arm over your head. Then breathe out while you drop your arm. Do Kegel exercises to prevent urine from leaking · You can do Kegel exercises while you stand or sit. ? Squeeze the same muscles you would use to stop your urine. Your belly and thighs should not move. ? Hold the squeeze for 3 seconds, and then relax for 3 seconds. ? Start with 3 seconds. Then add 1 second each week until you are able to squeeze for 10 seconds. ? Repeat the exercise 10 to 15 times for each session. Do three or more sessions each day. Ease or reduce swelling in your feet, ankles, hands, and fingers · If your fingers are puffy, take off your rings. · Do not eat high-salt foods, such as potato chips. · Prop up your feet on a stool or couch as much as possible. Sleep with pillows under your feet. · Do not stand for long periods of time or wear tight shoes. · Wear support stockings. Where can you learn more? Go to http://piero-audrey.info/ Enter G264 in the search box to learn more about \"Weeks 22 to 26 of Your Pregnancy: Care Instructions. \" Current as of: February 11, 2020               Content Version: 12.6 © 3389-5926 [employer] Incorporated. Care instructions adapted under license by [employer] (which disclaims liability or warranty for this information). If you have questions about a medical condition or this instruction, always ask your healthcare professional. [place] [address] any warranty or liability for your use of this information.

## 2020-09-22 NOTE — PROGRESS NOTES
FETAL SURVEY  A SINGLE VIABLE IUP OF 21W0D IS SEEN WITH FETAL CARDIAC MOTION OBSERVED. FETAL ANATOMY NOT WELL VISUALIZED DUE TO FETAL POSITION = SHORT AXIS HEART, AO AND LT  HAND. CLINICAL COORELATION RECOMMENDED. OTHERWISE ANATOMY APPEARS WNL. APPROPRIATE GROWTH MEASURED. SIZE=DATES. WING, PLACENTA, AND CERVIX APPEARS WNL.   GENDER: MALE

## 2020-10-21 ENCOUNTER — ROUTINE PRENATAL (OUTPATIENT)
Dept: OBGYN CLINIC | Age: 35
End: 2020-10-21
Payer: COMMERCIAL

## 2020-10-21 VITALS — WEIGHT: 188.6 LBS | SYSTOLIC BLOOD PRESSURE: 122 MMHG | DIASTOLIC BLOOD PRESSURE: 82 MMHG | BODY MASS INDEX: 27.85 KG/M2

## 2020-10-21 DIAGNOSIS — Z34.80 PRENATAL CARE OF MULTIGRAVIDA, ANTEPARTUM: ICD-10-CM

## 2020-10-21 DIAGNOSIS — Z34.80 SUPERVISION OF OTHER NORMAL PREGNANCY: Primary | ICD-10-CM

## 2020-10-21 DIAGNOSIS — Z23 ENCOUNTER FOR IMMUNIZATION: ICD-10-CM

## 2020-10-21 PROCEDURE — 76815 OB US LIMITED FETUS(S): CPT | Performed by: OBSTETRICS & GYNECOLOGY

## 2020-10-21 PROCEDURE — 90686 IIV4 VACC NO PRSV 0.5 ML IM: CPT

## 2020-10-21 PROCEDURE — 90471 IMMUNIZATION ADMIN: CPT

## 2020-10-21 PROCEDURE — 0502F SUBSEQUENT PRENATAL CARE: CPT | Performed by: OBSTETRICS & GYNECOLOGY

## 2020-10-21 NOTE — PROGRESS NOTES
+FM  Occ UCs    LIMITED OB SCAN 10/21/20  A SINGLE VERTEX 25w1D IUP IS SEEN. FETAL CARDIAC MOTION OBSERVED. LIMITED ANATOMY WAS VISUALIZED AND APPEARS WNL. APPROPRIATE GROWTH MEASURED. SIZE = DATES. WING AND PLACENTA APPEAR WITHIN NORMAL LIMITS. FOLLOW UP ANATOMY OF SHORT AXIS HEART, AO, DA AND LT HAND SEEN ON TODAYS EXAM APPEARS WNL.

## 2020-10-21 NOTE — PATIENT INSTRUCTIONS
Weeks 22 to 26 of Your Pregnancy: Care Instructions Your Care Instructions As you enter your 7th month of pregnancy at week 26, your baby's lungs are growing stronger and getting ready to breathe. You may notice that your baby responds to the sound of your or your partner's voice. You may also notice that your baby does less turning and twisting and more squirming or jerking. Jerking often means that your baby has the hiccups. Hiccups are perfectly normal and are only temporary. You may want to think about attending a childbirth preparation class. This is also a good time to start thinking about whether you want to have pain medicine during labor. Most pregnant women are tested for gestational diabetes between weeks 25 and 28. Gestational diabetes occurs when your blood sugar level gets too high when you're pregnant. The test is important, because you can have gestational diabetes and not know it. But the condition can cause problems for your baby. Follow-up care is a key part of your treatment and safety. Be sure to make and go to all appointments, and call your doctor if you are having problems. It's also a good idea to know your test results and keep a list of the medicines you take. How can you care for yourself at home? Ease discomfort from your baby's kicking · Change your position. Sometimes this will cause your baby to change position too. · Take a deep breath while you raise your arm over your head. Then breathe out while you drop your arm. Do Kegel exercises to prevent urine from leaking · You can do Kegel exercises while you stand or sit. ? Squeeze the same muscles you would use to stop your urine. Your belly and thighs should not move. ? Hold the squeeze for 3 seconds, and then relax for 3 seconds. ? Start with 3 seconds. Then add 1 second each week until you are able to squeeze for 10 seconds. ? Repeat the exercise 10 to 15 times for each session. Do three or more sessions each day. Ease or reduce swelling in your feet, ankles, hands, and fingers · If your fingers are puffy, take off your rings. · Do not eat high-salt foods, such as potato chips. · Prop up your feet on a stool or couch as much as possible. Sleep with pillows under your feet. · Do not stand for long periods of time or wear tight shoes. · Wear support stockings. Where can you learn more? Go to http://www.rodas.com/ Enter G264 in the search box to learn more about \"Weeks 22 to 26 of Your Pregnancy: Care Instructions. \" Current as of: February 11, 2020               Content Version: 12.6 © 2065-7309 [employer]. Care instructions adapted under license by [employer] (which disclaims liability or warranty for this information). If you have questions about a medical condition or this instruction, always ask your healthcare professional. [place] [address] any warranty or liability for your use of this information.

## 2020-10-21 NOTE — PROGRESS NOTES
After obtaining consent, and per orders of Dr. Jack Rader, injection of Flu Vaccine given by [facility], [facility], [place], [place] Patient instructed to remain in clinic for 20 minutes afterwards, and to report any adverse reaction to me immediately.     Flu Vaccine, Quadrivalent  :ID [employer]  Site: left  Deltoid  Route: Intramuscular  Dose: 0.5ML  Lot#: [id number]  Exp date: 06/30/21  NDC: 41979-863-84

## 2020-11-17 ENCOUNTER — ROUTINE PRENATAL (OUTPATIENT)
Dept: OBGYN CLINIC | Age: 35
End: 2020-11-17
Payer: COMMERCIAL

## 2020-11-17 VITALS — DIASTOLIC BLOOD PRESSURE: 76 MMHG | BODY MASS INDEX: 28.35 KG/M2 | SYSTOLIC BLOOD PRESSURE: 124 MMHG | WEIGHT: 192 LBS

## 2020-11-17 DIAGNOSIS — Z34.80 SUPERVISION OF OTHER NORMAL PREGNANCY: ICD-10-CM

## 2020-11-17 DIAGNOSIS — Z23 ENCOUNTER FOR IMMUNIZATION: ICD-10-CM

## 2020-11-17 DIAGNOSIS — Z36.9 UNSPECIFIED ANTENATAL SCREENING: Primary | ICD-10-CM

## 2020-11-17 DIAGNOSIS — Z34.80 PRENATAL CARE OF MULTIGRAVIDA, ANTEPARTUM: ICD-10-CM

## 2020-11-17 LAB
BASOPHILS # BLD: 0 K/UL (ref 0–0.1)
BASOPHILS NFR BLD: 0 % (ref 0–1)
BLOOD BANK CMNT PATIENT-IMP: NORMAL
BLOOD GROUP ANTIBODIES SERPL: NORMAL
DIFFERENTIAL METHOD BLD: NORMAL
EOSINOPHIL # BLD: 0.1 K/UL (ref 0–0.4)
EOSINOPHIL NFR BLD: 1 % (ref 0–7)
ERYTHROCYTE [DISTWIDTH] IN BLOOD BY AUTOMATED COUNT: 12.9 % (ref 11.5–14.5)
GLUCOSE 1H P 100 G GLC PO SERPL-MCNC: 67 MG/DL (ref 65–140)
HCT VFR BLD AUTO: 39.2 % (ref 35–47)
HGB BLD-MCNC: 12.9 G/DL (ref 11.5–16)
HIV 1+2 AB+HIV1 P24 AG SERPL QL IA: NONREACTIVE
HIV, EXTERNAL: NEGATIVE
HIV12 RESULT COMMENT, HHIVC: NORMAL
IMM GRANULOCYTES # BLD AUTO: 0 K/UL (ref 0–0.04)
IMM GRANULOCYTES NFR BLD AUTO: 0 % (ref 0–0.5)
LYMPHOCYTES # BLD: 2.2 K/UL (ref 0.8–3.5)
LYMPHOCYTES NFR BLD: 23 % (ref 12–49)
MCH RBC QN AUTO: 29.5 PG (ref 26–34)
MCHC RBC AUTO-ENTMCNC: 32.9 G/DL (ref 30–36.5)
MCV RBC AUTO: 89.5 FL (ref 80–99)
MONOCYTES # BLD: 0.5 K/UL (ref 0–1)
MONOCYTES NFR BLD: 6 % (ref 5–13)
NEUTS SEG # BLD: 6.6 K/UL (ref 1.8–8)
NEUTS SEG NFR BLD: 70 % (ref 32–75)
NRBC # BLD: 0 K/UL (ref 0–0.01)
NRBC BLD-RTO: 0 PER 100 WBC
PLATELET # BLD AUTO: 320 K/UL (ref 150–400)
PMV BLD AUTO: 10.2 FL (ref 8.9–12.9)
RBC # BLD AUTO: 4.38 M/UL (ref 3.8–5.2)
T. PALLIDUM, EXTERNAL: NEGATIVE
WBC # BLD AUTO: 9.4 K/UL (ref 3.6–11)

## 2020-11-17 PROCEDURE — 90471 IMMUNIZATION ADMIN: CPT | Performed by: OBSTETRICS & GYNECOLOGY

## 2020-11-17 PROCEDURE — 0502F SUBSEQUENT PRENATAL CARE: CPT | Performed by: OBSTETRICS & GYNECOLOGY

## 2020-11-17 PROCEDURE — 90715 TDAP VACCINE 7 YRS/> IM: CPT | Performed by: OBSTETRICS & GYNECOLOGY

## 2020-11-17 NOTE — PROGRESS NOTES
Nausea   Tired Busy     TDAP  Patient in office for TDAP injection, office supplied. After obtaining consent, and per orders of MD, injection of TDAP vaccine given by [facility], RN. Patient instructed to remain in clinic for 20 minutes afterwards, and to report any adverse reaction to me immediately.     Lot [id number]  Exp 11/7/22  Dose 0.5 ml  Site L deltoid  Route IM   [address] 08014-141-01

## 2020-11-17 NOTE — PATIENT INSTRUCTIONS

## 2020-11-18 LAB — ANTIBODY SCREEN, EXTERNAL: NEGATIVE

## 2020-11-19 LAB — T PALLIDUM AB SER QL IA: NON REACTIVE

## 2020-12-04 ENCOUNTER — ROUTINE PRENATAL (OUTPATIENT)
Dept: OBGYN CLINIC | Age: 35
End: 2020-12-04
Payer: COMMERCIAL

## 2020-12-04 VITALS — BODY MASS INDEX: 29.09 KG/M2 | DIASTOLIC BLOOD PRESSURE: 80 MMHG | WEIGHT: 197 LBS | SYSTOLIC BLOOD PRESSURE: 130 MMHG

## 2020-12-04 DIAGNOSIS — Z34.80 SUPERVISION OF OTHER NORMAL PREGNANCY: Primary | ICD-10-CM

## 2020-12-04 DIAGNOSIS — Z34.80 PRENATAL CARE OF MULTIGRAVIDA, ANTEPARTUM: ICD-10-CM

## 2020-12-04 PROCEDURE — 0502F SUBSEQUENT PRENATAL CARE: CPT | Performed by: OBSTETRICS & GYNECOLOGY

## 2020-12-04 NOTE — PATIENT INSTRUCTIONS

## 2020-12-18 ENCOUNTER — ROUTINE PRENATAL (OUTPATIENT)
Dept: OBGYN CLINIC | Age: 35
End: 2020-12-18
Payer: COMMERCIAL

## 2020-12-18 VITALS — WEIGHT: 201 LBS | SYSTOLIC BLOOD PRESSURE: 120 MMHG | DIASTOLIC BLOOD PRESSURE: 82 MMHG | BODY MASS INDEX: 29.68 KG/M2

## 2020-12-18 DIAGNOSIS — Z34.80 SUPERVISION OF OTHER NORMAL PREGNANCY: Primary | ICD-10-CM

## 2020-12-18 PROCEDURE — 0502F SUBSEQUENT PRENATAL CARE: CPT | Performed by: OBSTETRICS & GYNECOLOGY

## 2020-12-18 NOTE — PATIENT INSTRUCTIONS
Weeks 32 to 34 of Your Pregnancy: Care Instructions  Your Care Instructions     During the last few weeks of your pregnancy, you may have more aches and pains. It's important to rest when you can. Your growing baby is putting more pressure on your bladder. So you may need to urinate more often. Hemorrhoids are also common. These are painful, itchy veins in the rectal area. In the 36th week, most women have a test for group B streptococcus (GBS). GBS is a common bacteria that can live in the vagina and rectum. It can make your baby sick after birth. If you test positive, you will get antibiotics during labor. These will keep your baby from getting the bacteria. You may want to talk with your doctor about banking your baby's umbilical cord blood. This is the blood left in the cord after birth. If you want to save this blood, you must arrange it ahead of time. You can't decide at the last minute. If you haven't already had the Tdap shot during this pregnancy, talk to your doctor about getting it. It will help protect your  against pertussis infection. Follow-up care is a key part of your treatment and safety. Be sure to make and go to all appointments, and call your doctor if you are having problems. It's also a good idea to know your test results and keep a list of the medicines you take. How can you care for yourself at home? Ease hemorrhoids  · Get more liquids, fruits, vegetables, and fiber in your diet. This will help keep your stools soft. · Avoid sitting for too long. Lie on your left side several times a day. · Clean yourself with soft, moist toilet paper. Or you can use witch hazel pads or personal hygiene pads. · If you are uncomfortable, try ice packs. Or you can sit in a warm sitz bath. Do these for 20 minutes at a time, as needed. · Use hydrocortisone cream for pain and itching. Two examples are Anusol and Preparation H Hydrocortisone.   · Ask your doctor about taking an over-the-counter stool softener. Consider breastfeeding  · Experts recommend that women breastfeed for 1 year or longer. · Breast milk may help protect your child from some health problems.  babies are less likely than formula-fed babies to:  ? Get ear infections, colds, diarrhea, and pneumonia. ? Be obese or get diabetes later in life. · Women who breastfeed have less bleeding after the birth. Their uteruses also shrink back faster. · Some women who breastfeed lose weight faster. Making milk burns calories. · Breastfeeding can lower your risk of breast cancer, ovarian cancer, and osteoporosis. Decide about circumcision for boys  · As you make this decision, it may help to think about your personal, Mu-ism, and family traditions. You get to decide if you will keep your son's penis natural or if he will be circumcised. · If you decide that you would like to have your baby circumcised, talk with your doctor. You can share your concerns about pain. And you can discuss your preferences for anesthesia. Where can you learn more? Go to http://www.[employer]ments.com/  Enter X711 in the search box to learn more about \"Weeks 32 to 34 of Your Pregnancy: Care Instructions. \"  Current as of: February 11, 2020               Content Version: 12.6  © 0668-7279 [employer] Incorporated. Care instructions adapted under license by [employer] (which disclaims liability or warranty for this information). If you have questions about a medical condition or this instruction, always ask your healthcare professional. [place] [address] any warranty or liability for your use of this information.

## 2021-01-07 ENCOUNTER — ROUTINE PRENATAL (OUTPATIENT)
Dept: OBGYN CLINIC | Age: 36
End: 2021-01-07
Payer: COMMERCIAL

## 2021-01-07 VITALS
HEIGHT: 69 IN | BODY MASS INDEX: 30.66 KG/M2 | SYSTOLIC BLOOD PRESSURE: 120 MMHG | DIASTOLIC BLOOD PRESSURE: 72 MMHG | WEIGHT: 207 LBS

## 2021-01-07 DIAGNOSIS — Z36.85 ANTENATAL SCREENING FOR STREPTOCOCCUS B: Primary | ICD-10-CM

## 2021-01-07 DIAGNOSIS — Z34.80 PRENATAL CARE OF MULTIGRAVIDA, ANTEPARTUM: ICD-10-CM

## 2021-01-07 LAB — GRBS, EXTERNAL: POSITIVE

## 2021-01-07 PROCEDURE — 0502F SUBSEQUENT PRENATAL CARE: CPT | Performed by: OBSTETRICS & GYNECOLOGY

## 2021-01-07 NOTE — PROGRESS NOTES
Doing fine; GBS positive; Current recommendations regarding Coronavirus reviewed and reassurance offered.

## 2021-01-15 ENCOUNTER — ROUTINE PRENATAL (OUTPATIENT)
Dept: OBGYN CLINIC | Age: 36
End: 2021-01-15
Payer: COMMERCIAL

## 2021-01-15 ENCOUNTER — HOSPITAL ENCOUNTER (OUTPATIENT)
Dept: PREADMISSION TESTING | Age: 36
Discharge: HOME | End: 2021-01-15
Payer: COMMERCIAL

## 2021-01-15 ENCOUNTER — TRANSCRIBE ORDER (OUTPATIENT)
Dept: REGISTRATION | Age: 36
End: 2021-01-15

## 2021-01-15 VITALS — DIASTOLIC BLOOD PRESSURE: 86 MMHG | SYSTOLIC BLOOD PRESSURE: 134 MMHG | BODY MASS INDEX: 30.86 KG/M2 | WEIGHT: 209 LBS

## 2021-01-15 DIAGNOSIS — Z01.812 PRE-PROCEDURE LAB EXAM: Primary | ICD-10-CM

## 2021-01-15 DIAGNOSIS — Z01.812 PRE-PROCEDURE LAB EXAM: ICD-10-CM

## 2021-01-15 DIAGNOSIS — Z34.80 PRENATAL CARE OF MULTIGRAVIDA, ANTEPARTUM: ICD-10-CM

## 2021-01-15 LAB — COVID-19, EXTERNAL: NEGATIVE

## 2021-01-15 PROCEDURE — U0003 INFECTIOUS AGENT DETECTION BY NUCLEIC ACID (DNA OR RNA); SEVERE ACUTE RESPIRATORY SYNDROME CORONAVIRUS 2 (SARS-COV-2) (CORONAVIRUS DISEASE [COVID-19]), AMPLIFIED PROBE TECHNIQUE, MAKING USE OF HIGH THROUGHPUT TECHNOLOGIES AS DESCRIBED BY CMS-2020-01-R: HCPCS

## 2021-01-15 PROCEDURE — 0502F SUBSEQUENT PRENATAL CARE: CPT | Performed by: OBSTETRICS & GYNECOLOGY

## 2021-01-15 NOTE — PROGRESS NOTES
Tired; reviewed today's BP; has been normotensive in past 2 pregnancies  Current recommendations regarding Coronavirus reviewed and reassurance offered.   Insomnia

## 2021-01-16 LAB — SARS-COV-2, COV2NT: NOT DETECTED

## 2021-01-22 ENCOUNTER — ROUTINE PRENATAL (OUTPATIENT)
Dept: OBGYN CLINIC | Age: 36
End: 2021-01-22
Payer: COMMERCIAL

## 2021-01-22 VITALS — BODY MASS INDEX: 31.45 KG/M2 | WEIGHT: 213 LBS | SYSTOLIC BLOOD PRESSURE: 130 MMHG | DIASTOLIC BLOOD PRESSURE: 74 MMHG

## 2021-01-22 DIAGNOSIS — Z34.80 PRENATAL CARE OF MULTIGRAVIDA, ANTEPARTUM: Primary | ICD-10-CM

## 2021-01-22 PROCEDURE — 0502F SUBSEQUENT PRENATAL CARE: CPT | Performed by: OBSTETRICS & GYNECOLOGY

## 2021-01-22 NOTE — PATIENT INSTRUCTIONS

## 2021-01-29 ENCOUNTER — ROUTINE PRENATAL (OUTPATIENT)
Dept: OBGYN CLINIC | Age: 36
End: 2021-01-29
Payer: COMMERCIAL

## 2021-01-29 VITALS — WEIGHT: 211.4 LBS | SYSTOLIC BLOOD PRESSURE: 128 MMHG | BODY MASS INDEX: 31.22 KG/M2 | DIASTOLIC BLOOD PRESSURE: 88 MMHG

## 2021-01-29 DIAGNOSIS — Z34.80 PRENATAL CARE OF MULTIGRAVIDA, ANTEPARTUM: Primary | ICD-10-CM

## 2021-01-29 PROCEDURE — 0502F SUBSEQUENT PRENATAL CARE: CPT | Performed by: OBSTETRICS & GYNECOLOGY

## 2021-01-29 NOTE — PROGRESS NOTES
Pelvic ligaments loosening- discomfort  +FM  Sharp pains in pelvis occasionally  Requests cervical check

## 2021-01-29 NOTE — PATIENT INSTRUCTIONS
Week 39 of Your Pregnancy: Care Instructions  Your Care Instructions     During these final weeks, you may feel anxious to see your new baby. [place] babies often look different from what you see in pictures or movies. Right after birth, their heads may have a strange shape. Their eyes may be puffy. And their genitals may be swollen. They may also have very dry skin, or red marks on the eyelids, nose, or neck. Still, most parents think their babies are beautiful. Follow-up care is a key part of your treatment and safety. Be sure to make and go to all appointments, and call your doctor if you are having problems. It's also a good idea to know your test results and keep a list of the medicines you take. How can you care for yourself at home? Prepare to breastfeed  · If you are breastfeeding, continue to eat healthy foods. · If your health care provider recommends it, keep taking your prenatal vitamins. · Talk to your doctor before you take any medicine or supplement. That's because some medicines can affect your breast milk. · You can help prevent sore nipples if you feed your baby in the correct position. Nurses will help you learn to do this. Choose the right birth control after your baby is born  · Women who are breastfeeding can still get pregnant. Use birth control if you don't want to get pregnant. · Intrauterine devices (IUDs) are very effective at preventing pregnancy and can provide birth control for 3 to 10 years, depending on the type. If you talk with your doctor before having your baby, the IUD can be placed right after you give birth. If you decide you want to get pregnant later, you can have it removed. They are safe to use while you are breastfeeding. · A hormonal implant is also very effective at preventing pregnancy. It is placed under the skin of the arm. This can be done right after you give birth. It releases the hormone progestin and prevents pregnancy for about 3 years.  This can also be removed by a doctor at any time. It is safe to use while you are breastfeeding. · Depo-Provera can be used while you are breastfeeding. It is a shot you get every 3 months. · Birth control pills work well. But you need a different kind of pill for the first few weeks after giving birth. And when you start taking these pills, you need to make sure to use another type of birth control for 7 days after you start your pack. · Diaphragms, cervical caps, and condoms with spermicide work less well after birth. If you have a diaphragm or cervical cap, talk to your doctor to see if you need a different size. · Tubal ligation (tying your tubes) and vasectomy are both permanent. These are good options if you are sure you are done having children. Where can you learn more? Go to http://www.gray.com/  Enter A811 in the search box to learn more about \"Week 39 of Your Pregnancy: Care Instructions. \"  Current as of: February 11, 2020               Content Version: 12.6  © 3855-7116 [employer]. Care instructions adapted under license by [employer] (which disclaims liability or warranty for this information). If you have questions about a medical condition or this instruction, always ask your healthcare professional. [address] any warranty or liability for your use of this information.

## 2021-02-01 ENCOUNTER — ROUTINE PRENATAL (OUTPATIENT)
Dept: OBGYN CLINIC | Age: 36
End: 2021-02-01
Payer: COMMERCIAL

## 2021-02-01 VITALS — SYSTOLIC BLOOD PRESSURE: 116 MMHG | DIASTOLIC BLOOD PRESSURE: 78 MMHG | BODY MASS INDEX: 31.31 KG/M2 | WEIGHT: 212 LBS

## 2021-02-01 DIAGNOSIS — Z34.80 PRENATAL CARE OF MULTIGRAVIDA, ANTEPARTUM: Primary | ICD-10-CM

## 2021-02-01 PROCEDURE — 0502F SUBSEQUENT PRENATAL CARE: CPT | Performed by: OBSTETRICS & GYNECOLOGY

## 2021-02-01 NOTE — PATIENT INSTRUCTIONS
Week 39 of Your Pregnancy: Care Instructions  Your Care Instructions     During these final weeks, you may feel anxious to see your new baby. [place] babies often look different from what you see in pictures or movies. Right after birth, their heads may have a strange shape. Their eyes may be puffy. And their genitals may be swollen. They may also have very dry skin, or red marks on the eyelids, nose, or neck. Still, most parents think their babies are beautiful. Follow-up care is a key part of your treatment and safety. Be sure to make and go to all appointments, and call your doctor if you are having problems. It's also a good idea to know your test results and keep a list of the medicines you take. How can you care for yourself at home? Prepare to breastfeed  · If you are breastfeeding, continue to eat healthy foods. · If your health care provider recommends it, keep taking your prenatal vitamins. · Talk to your doctor before you take any medicine or supplement. That's because some medicines can affect your breast milk. · You can help prevent sore nipples if you feed your baby in the correct position. Nurses will help you learn to do this. Choose the right birth control after your baby is born  · Women who are breastfeeding can still get pregnant. Use birth control if you don't want to get pregnant. · Intrauterine devices (IUDs) are very effective at preventing pregnancy and can provide birth control for 3 to 10 years, depending on the type. If you talk with your doctor before having your baby, the IUD can be placed right after you give birth. If you decide you want to get pregnant later, you can have it removed. They are safe to use while you are breastfeeding. · A hormonal implant is also very effective at preventing pregnancy. It is placed under the skin of the arm. This can be done right after you give birth. It releases the hormone progestin and prevents pregnancy for about 3 years.  This can also be removed by a doctor at any time. It is safe to use while you are breastfeeding. · Depo-Provera can be used while you are breastfeeding. It is a shot you get every 3 months. · Birth control pills work well. But you need a different kind of pill for the first few weeks after giving birth. And when you start taking these pills, you need to make sure to use another type of birth control for 7 days after you start your pack. · Diaphragms, cervical caps, and condoms with spermicide work less well after birth. If you have a diaphragm or cervical cap, talk to your doctor to see if you need a different size. · Tubal ligation (tying your tubes) and vasectomy are both permanent. These are good options if you are sure you are done having children. Where can you learn more? Go to http://www.gray.com/  Enter A811 in the search box to learn more about \"Week 39 of Your Pregnancy: Care Instructions. \"  Current as of: February 11, 2020               Content Version: 12.6  © 0312-6335 [employer]. Care instructions adapted under license by [employer] (which disclaims liability or warranty for this information). If you have questions about a medical condition or this instruction, always ask your healthcare professional. [address] any warranty or liability for your use of this information.

## 2021-02-03 ENCOUNTER — ROUTINE PRENATAL (OUTPATIENT)
Dept: OBGYN CLINIC | Age: 36
End: 2021-02-03
Payer: COMMERCIAL

## 2021-02-03 ENCOUNTER — TELEPHONE (OUTPATIENT)
Dept: OBGYN CLINIC | Age: 36
End: 2021-02-03

## 2021-02-03 VITALS — DIASTOLIC BLOOD PRESSURE: 78 MMHG | WEIGHT: 213 LBS | SYSTOLIC BLOOD PRESSURE: 130 MMHG | BODY MASS INDEX: 31.45 KG/M2

## 2021-02-03 DIAGNOSIS — Z34.80 PRENATAL CARE OF MULTIGRAVIDA, ANTEPARTUM: Primary | ICD-10-CM

## 2021-02-03 PROCEDURE — 0502F SUBSEQUENT PRENATAL CARE: CPT | Performed by: OBSTETRICS & GYNECOLOGY

## 2021-02-03 NOTE — PATIENT INSTRUCTIONS
Week 40 of Your Pregnancy: Care Instructions  Your Care Instructions     By week 40, you have reached your due date. Your baby could be coming any day. But it's a good idea to think ahead to the next few weeks and what might happen. If this is your first time having a baby, try not to worry. If you don't start labor on your own by 41 or 42 weeks, your doctor may recommend giving you medicines to start labor. This care sheet gives you information about how labor can be started. It also gives you some ideas about breathing exercises you can do if you start to feel anxious or if you are trying to relax. Follow-up care is a key part of your treatment and safety. Be sure to make and go to all appointments, and call your doctor if you are having problems. It's also a good idea to know your test results and keep a list of the medicines you take. How can you care for yourself at home? Learn how labor can be started  · If you and your baby are both healthy and ready, and if your cervix has started to open, your doctor may \"break your water\" (rupture the amniotic sac). This often starts labor. · If your cervix is not quite ready, you may get a medicine called Pitocin through an IV to start contractions. · If your cervix is still very firm, you may have prostaglandin tablets (misoprostol) placed in your vagina to soften the cervix. Try guided imagery to help you relax  · Find a comfortable place to sit or lie down. Close your eyes. · Start by just taking a few deep breaths to help you relax. · Picture a setting that is calm and peaceful. This could be a beach, a mountain setting, a meadow, or a scene that you choose. · Imagine your scene, and try to add some detail. For example, is there a breeze? What does the shirley look like? Is it clear, or are there clouds? · It often helps to add a path to your scene.  For example, as you enter the meadow, imagine a path leading you through the meadow to the trees on the other side. As you follow the path farther into the [facility] you feel more and more relaxed. · When you are deep into your scene and are feeling relaxed, take a few minutes to breathe slowly and feel the calm. · When you are ready, slowly take yourself out of the scene back to the present. Tell yourself that you will feel relaxed and refreshed and will bring that sense of calm with you. · Count to 3, and open your eyes. Where can you learn more? Go to http://www.gray.com/  Enter T922 in the search box to learn more about \"Week 40 of Your Pregnancy: Care Instructions. \"  Current as of: February 11, 2020               Content Version: 12.6  © 9586-3569 [employer]. Care instructions adapted under license by [employer] (which disclaims liability or warranty for this information). If you have questions about a medical condition or this instruction, always ask your healthcare professional. [address] any warranty or liability for your use of this information.

## 2021-02-03 NOTE — TELEPHONE ENCOUNTER
Call received at 8:35am      30year old patient  40w1d pregnant patient last seen in the office on 2021      Patient calling to say that she has been having contractions all night from 11:00PM to 7am every 10 minutes lasting about a minute and now that she has gotten up and moving around the contractions have stalled    Patient denies vaginal bleeding ,ROM, and reports positive fetal movement.         Patient is wondering about coming in today instead of tomorrow afternoon    Patient was placed on the schedule to be seen at 9:50am today      Please confirm that is ok or patient should wait till tomorrow      Please advise

## 2021-02-03 NOTE — TELEPHONE ENCOUNTER
Please let her know to update us if contractions resume; also offer her cervical check in office w me today or tomorrow

## 2021-02-03 NOTE — TELEPHONE ENCOUNTER
Patient advised of Md recommendations and will come today at 9:50am    Patient verbalized understanding.   VICKI

## 2021-02-05 ENCOUNTER — HOSPITAL ENCOUNTER (INPATIENT)
Age: 36
LOS: 1 days | Discharge: HOME | End: 2021-02-06
Attending: OBSTETRICS & GYNECOLOGY | Admitting: OBSTETRICS & GYNECOLOGY
Payer: COMMERCIAL

## 2021-02-05 ENCOUNTER — ROUTINE PRENATAL (OUTPATIENT)
Dept: OBGYN CLINIC | Age: 36
End: 2021-02-05
Payer: COMMERCIAL

## 2021-02-05 VITALS — WEIGHT: 209 LBS | BODY MASS INDEX: 30.86 KG/M2 | SYSTOLIC BLOOD PRESSURE: 140 MMHG | DIASTOLIC BLOOD PRESSURE: 96 MMHG

## 2021-02-05 DIAGNOSIS — Z34.80 PRENATAL CARE OF MULTIGRAVIDA, ANTEPARTUM: Primary | ICD-10-CM

## 2021-02-05 LAB
ERYTHROCYTE [DISTWIDTH] IN BLOOD BY AUTOMATED COUNT: 13.5 % (ref 11.5–14.5)
HCT VFR BLD AUTO: 37.6 % (ref 35–47)
HGB BLD-MCNC: 12.6 G/DL (ref 11.5–16)
MCH RBC QN AUTO: 28.5 PG (ref 26–34)
MCHC RBC AUTO-ENTMCNC: 33.5 G/DL (ref 30–36.5)
MCV RBC AUTO: 85.1 FL (ref 80–99)
NRBC # BLD: 0 K/UL (ref 0–0.01)
NRBC BLD-RTO: 0 PER 100 WBC
PLATELET # BLD AUTO: 343 K/UL (ref 150–400)
PMV BLD AUTO: 10 FL (ref 8.9–12.9)
RBC # BLD AUTO: 4.42 M/UL (ref 3.8–5.2)
WBC # BLD AUTO: 13.8 K/UL (ref 3.6–11)

## 2021-02-05 PROCEDURE — 65410000002 HC RM PRIVATE OB

## 2021-02-05 PROCEDURE — 75410000000 HC DELIVERY VAGINAL/SINGLE

## 2021-02-05 PROCEDURE — 59400 OBSTETRICAL CARE: CPT | Performed by: OBSTETRICS & GYNECOLOGY

## 2021-02-05 PROCEDURE — 74011250637 HC RX REV CODE- 250/637: Performed by: OBSTETRICS & GYNECOLOGY

## 2021-02-05 PROCEDURE — 85027 COMPLETE CBC AUTOMATED: CPT

## 2021-02-05 PROCEDURE — 0502F SUBSEQUENT PRENATAL CARE: CPT | Performed by: OBSTETRICS & GYNECOLOGY

## 2021-02-05 PROCEDURE — 10907ZC DRAINAGE OF AMNIOTIC FLUID, THERAPEUTIC FROM PRODUCTS OF CONCEPTION, VIA NATURAL OR ARTIFICIAL OPENING: ICD-10-PCS | Performed by: OBSTETRICS & GYNECOLOGY

## 2021-02-05 PROCEDURE — 74011250636 HC RX REV CODE- 250/636: Performed by: OBSTETRICS & GYNECOLOGY

## 2021-02-05 PROCEDURE — 74011000258 HC RX REV CODE- 258: Performed by: OBSTETRICS & GYNECOLOGY

## 2021-02-05 PROCEDURE — 75410000003 HC RECOV DEL/VAG/CSECN EA 0.5 HR

## 2021-02-05 PROCEDURE — 75410000002 HC LABOR FEE PER 1 HR

## 2021-02-05 PROCEDURE — 36415 COLL VENOUS BLD VENIPUNCTURE: CPT

## 2021-02-05 RX ORDER — FENTANYL CITRATE 50 UG/ML
100 INJECTION, SOLUTION INTRAMUSCULAR; INTRAVENOUS ONCE
Status: DISCONTINUED | OUTPATIENT
Start: 2021-02-05 | End: 2021-02-05

## 2021-02-05 RX ORDER — DOCUSATE SODIUM 100 MG/1
100 CAPSULE, LIQUID FILLED ORAL
Status: DISCONTINUED | OUTPATIENT
Start: 2021-02-05 | End: 2021-02-07 | Stop reason: HOSPADM

## 2021-02-05 RX ORDER — EPHEDRINE SULFATE/0.9% NACL/PF 50 MG/5 ML
12.5 SYRINGE (ML) INTRAVENOUS
Status: DISCONTINUED | OUTPATIENT
Start: 2021-02-05 | End: 2021-02-05

## 2021-02-05 RX ORDER — OXYTOCIN/RINGER'S LACTATE 30/500 ML
10 PLASTIC BAG, INJECTION (ML) INTRAVENOUS AS NEEDED
Status: DISCONTINUED | OUTPATIENT
Start: 2021-02-05 | End: 2021-02-05

## 2021-02-05 RX ORDER — SODIUM CHLORIDE, SODIUM LACTATE, POTASSIUM CHLORIDE, CALCIUM CHLORIDE 600; 310; 30; 20 MG/100ML; MG/100ML; MG/100ML; MG/100ML
125 INJECTION, SOLUTION INTRAVENOUS CONTINUOUS
Status: DISCONTINUED | OUTPATIENT
Start: 2021-02-05 | End: 2021-02-05

## 2021-02-05 RX ORDER — HYDROCORTISONE 1 %
CREAM (GRAM) TOPICAL AS NEEDED
Status: DISCONTINUED | OUTPATIENT
Start: 2021-02-05 | End: 2021-02-07 | Stop reason: HOSPADM

## 2021-02-05 RX ORDER — HYDROCORTISONE ACETATE PRAMOXINE HCL 2.5; 1 G/100G; G/100G
CREAM TOPICAL AS NEEDED
Status: DISCONTINUED | OUTPATIENT
Start: 2021-02-05 | End: 2021-02-07 | Stop reason: HOSPADM

## 2021-02-05 RX ORDER — SIMETHICONE 80 MG
80 TABLET,CHEWABLE ORAL
Status: DISCONTINUED | OUTPATIENT
Start: 2021-02-05 | End: 2021-02-07 | Stop reason: HOSPADM

## 2021-02-05 RX ORDER — FENTANYL CITRATE 50 UG/ML
INJECTION, SOLUTION INTRAMUSCULAR; INTRAVENOUS
Status: DISCONTINUED
Start: 2021-02-05 | End: 2021-02-05

## 2021-02-05 RX ORDER — BUPIVACAINE HYDROCHLORIDE 2.5 MG/ML
INJECTION, SOLUTION EPIDURAL; INFILTRATION; INTRACAUDAL
Status: DISCONTINUED
Start: 2021-02-05 | End: 2021-02-05

## 2021-02-05 RX ORDER — IBUPROFEN 400 MG/1
800 TABLET ORAL
Status: DISCONTINUED | OUTPATIENT
Start: 2021-02-05 | End: 2021-02-07 | Stop reason: HOSPADM

## 2021-02-05 RX ORDER — OXYTOCIN/RINGER'S LACTATE 30/500 ML
10 PLASTIC BAG, INJECTION (ML) INTRAVENOUS AS NEEDED
Status: DISCONTINUED | OUTPATIENT
Start: 2021-02-05 | End: 2021-02-07 | Stop reason: HOSPADM

## 2021-02-05 RX ORDER — SODIUM CHLORIDE 0.9 % (FLUSH) 0.9 %
5-40 SYRINGE (ML) INJECTION AS NEEDED
Status: DISCONTINUED | OUTPATIENT
Start: 2021-02-05 | End: 2021-02-07 | Stop reason: HOSPADM

## 2021-02-05 RX ORDER — ACETAMINOPHEN 325 MG/1
650 TABLET ORAL
Status: DISCONTINUED | OUTPATIENT
Start: 2021-02-05 | End: 2021-02-07 | Stop reason: HOSPADM

## 2021-02-05 RX ORDER — OXYTOCIN/RINGER'S LACTATE 30/500 ML
87.3 PLASTIC BAG, INJECTION (ML) INTRAVENOUS AS NEEDED
Status: DISCONTINUED | OUTPATIENT
Start: 2021-02-05 | End: 2021-02-05

## 2021-02-05 RX ORDER — AMMONIA 15 % (W/V)
1 AMPUL (EA) INHALATION AS NEEDED
Status: DISCONTINUED | OUTPATIENT
Start: 2021-02-05 | End: 2021-02-07 | Stop reason: HOSPADM

## 2021-02-05 RX ORDER — TERBUTALINE SULFATE 1 MG/ML
0.25 INJECTION SUBCUTANEOUS AS NEEDED
Status: DISCONTINUED | OUTPATIENT
Start: 2021-02-05 | End: 2021-02-05

## 2021-02-05 RX ORDER — ZOLPIDEM TARTRATE 5 MG/1
5 TABLET ORAL
Status: DISCONTINUED | OUTPATIENT
Start: 2021-02-05 | End: 2021-02-07 | Stop reason: HOSPADM

## 2021-02-05 RX ORDER — IBUPROFEN 400 MG/1
800 TABLET ORAL EVERY 8 HOURS
Status: DISCONTINUED | OUTPATIENT
Start: 2021-02-05 | End: 2021-02-07 | Stop reason: HOSPADM

## 2021-02-05 RX ORDER — SODIUM CHLORIDE 0.9 % (FLUSH) 0.9 %
5-40 SYRINGE (ML) INJECTION EVERY 8 HOURS
Status: DISCONTINUED | OUTPATIENT
Start: 2021-02-05 | End: 2021-02-07 | Stop reason: HOSPADM

## 2021-02-05 RX ORDER — FENTANYL/BUPIVACAINE/NS/PF 2-1250MCG
10 PREFILLED PUMP RESERVOIR EPIDURAL CONTINUOUS
Status: DISCONTINUED | OUTPATIENT
Start: 2021-02-05 | End: 2021-02-05

## 2021-02-05 RX ORDER — OXYTOCIN/RINGER'S LACTATE 30/500 ML
87.3 PLASTIC BAG, INJECTION (ML) INTRAVENOUS AS NEEDED
Status: DISCONTINUED | OUTPATIENT
Start: 2021-02-05 | End: 2021-02-07 | Stop reason: HOSPADM

## 2021-02-05 RX ORDER — OXYCODONE AND ACETAMINOPHEN 5; 325 MG/1; MG/1
1 TABLET ORAL
Status: DISCONTINUED | OUTPATIENT
Start: 2021-02-05 | End: 2021-02-07 | Stop reason: HOSPADM

## 2021-02-05 RX ADMIN — AMPICILLIN SODIUM 2 G: 2 INJECTION, POWDER, FOR SOLUTION INTRAMUSCULAR; INTRAVENOUS at 11:09

## 2021-02-05 RX ADMIN — AMPICILLIN SODIUM 1 G: 1 INJECTION, POWDER, FOR SOLUTION INTRAMUSCULAR; INTRAVENOUS at 14:52

## 2021-02-05 RX ADMIN — SODIUM CHLORIDE, POTASSIUM CHLORIDE, SODIUM LACTATE AND CALCIUM CHLORIDE 125 ML/HR: 600; 310; 30; 20 INJECTION, SOLUTION INTRAVENOUS at 11:06

## 2021-02-05 RX ADMIN — IBUPROFEN 800 MG: 400 TABLET, FILM COATED ORAL at 19:13

## 2021-02-05 NOTE — PATIENT INSTRUCTIONS
Week 40 of Your Pregnancy: Care Instructions  Your Care Instructions     By week 40, you have reached your due date. Your baby could be coming any day. But it's a good idea to think ahead to the next few weeks and what might happen. If this is your first time having a baby, try not to worry. If you don't start labor on your own by 41 or 42 weeks, your doctor may recommend giving you medicines to start labor. This care sheet gives you information about how labor can be started. It also gives you some ideas about breathing exercises you can do if you start to feel anxious or if you are trying to relax. Follow-up care is a key part of your treatment and safety. Be sure to make and go to all appointments, and call your doctor if you are having problems. It's also a good idea to know your test results and keep a list of the medicines you take. How can you care for yourself at home? Learn how labor can be started  · If you and your baby are both healthy and ready, and if your cervix has started to open, your doctor may \"break your water\" (rupture the amniotic sac). This often starts labor. · If your cervix is not quite ready, you may get a medicine called Pitocin through an IV to start contractions. · If your cervix is still very firm, you may have prostaglandin tablets (misoprostol) placed in your vagina to soften the cervix. Try guided imagery to help you relax  · Find a comfortable place to sit or lie down. Close your eyes. · Start by just taking a few deep breaths to help you relax. · Picture a setting that is calm and peaceful. This could be a beach, a mountain setting, a meadow, or a scene that you choose. · Imagine your scene, and try to add some detail. For example, is there a breeze? What does the shirley look like? Is it clear, or are there clouds? · It often helps to add a path to your scene.  For example, as you enter the meadow, imagine a path leading you through the meadow to the trees on the other side. As you follow the path farther into the [facility] you feel more and more relaxed. · When you are deep into your scene and are feeling relaxed, take a few minutes to breathe slowly and feel the calm. · When you are ready, slowly take yourself out of the scene back to the present. Tell yourself that you will feel relaxed and refreshed and will bring that sense of calm with you. · Count to 3, and open your eyes. Where can you learn more? Go to http://www.gray.com/  Enter T922 in the search box to learn more about \"Week 40 of Your Pregnancy: Care Instructions. \"  Current as of: February 11, 2020               Content Version: 12.6  © 6230-8880 [employer]. Care instructions adapted under license by [employer] (which disclaims liability or warranty for this information). If you have questions about a medical condition or this instruction, always ask your healthcare professional. [address] any warranty or liability for your use of this information.

## 2021-02-05 NOTE — PROGRESS NOTES
1530 - Bedside shift change report given to Janina Beckford RN (oncoming nurse) by Keri Diaz RN (offgoing nurse). Report included the following information SBAR. Patient lamazing and on birthing ball. Heidy Cleary on her way to assess and do SVE to assess progression. Patient states feeling lots of rectal pressure. Membranes still in tact. Rec'd two doses of antibiotics    1548 - Notified that Dr Heidy Cleary is in another patient room assisting with a delivery and will be in to assess this patient shortly. 26 - Dr Heidy Cleary at bedside to check patient. SVE -. AROM clear fluid, moderate amount. Plan to monitor progress and possible consider pitocin if contractions do not intensify with AROM, Patient up in Throne/chair position immediately following AROM    1630 - RN remained at bedside adjusting monitors. 1715 - Side lying pelvic release on left side    1720 - Dr Heidy Cleary at bedside, offered to give patient option for pitocin but patient declined and wishes to give her body more time. 1726 - Side lying pelvic release on right side    1736 - Patient to bathroom    1738 - Knee/Chest    1745 - Patient feeling pretty uncomfortable in knee/chest. Demonstrated hip squeezes and encouraged Dad to give hip squeezes during contractions. RN remained at bedside    [address] - Patient out of knee chest.    181 - Hirata at bedside, SVE 10/100    181 - RN at bedside, continuously monitoring FHR tracing     -  of LIVE baby [address] - Bedside shift change report given to Nayla Vidales (oncoming nurse) by Janina Beckford RN (offgoing nurse). Report included the following information SBAR.

## 2021-02-05 NOTE — H&P
History & Physical    Name: Joshua Thomas MRN: [id number]  SSN: [id number]    YOB: 1985  Age: 30 y.o. Sex: female        Subjective:     Estimated Date of Delivery: 21  OB History        4    Para   2    Term   2            AB   1    Living   2       SAB   1    TAB        Ectopic        Molar        Multiple   0    Live Births   2                Ms. Hummel Breech is admitted with pregnancy at 40w3d for active labor. Prenatal course as below. Please see prenatal records for details. Patient Active Problem List    Diagnosis    Prenatal care of multigravida, antepartum     Primary Provider: Jony Canada P2  EDC by LMP/US  HAVING A THIRD BOY  COVID negative  Hx  x2 boys; Mohan Forrester; largest 7lb 11oz Dr Rigoberto Merazzhen Tanya   Covid positive ; she tested negative twice  IOB labs: GBS in urine  Genetic Screening: declines all  Anatomy:  Normal   GTT:  normal  Flu: done  TDAP: done  Rhogam:  Apositive  GBS: GBS uria  Circ:            No specialty comments available. Past Medical History:   Diagnosis Date    Abnormal Papanicolaou smear of cervix     colposcopy, dr. Maxine Enamorado Pap smear for cervical cancer screening 2017 neg HPV POS     Past Surgical History:   Procedure Laterality Date    HX HEENT      wisdom teeth extraction    HX HEENT      nasal fracture repair    HX OTHER SURGICAL  2019    vaginal delivery      Social History     Occupational History    Not on file   Tobacco Use    Smoking status: Never Smoker    Smokeless tobacco: Never Used   Substance and Sexual Activity    Alcohol use: Not Currently     Frequency: Never     Comment: occasional    Drug use: No    Sexual activity: Yes     Partners: Male     Family History   Problem Relation Age of Onset    No Known Problems Father     Elevated Lipids Mother        No Known Allergies  Prior to Admission medications    Medication Sig Start Date End Date Taking? Authorizing Provider   cyanocobalamin, vitamin B-12, (VITAMIN B12 PO) Take  by mouth. Yes Provider, Historical   prenatal multivit-ca-min-fe-fa tab Take  by mouth. Yes Provider, Historical        Review of Systems: A comprehensive review of systems was negative except for that written in the HPI. Objective:     Vitals:  Vitals:    02/05/21 1100 02/05/21 1101   BP:  123/76   Pulse:  76   Resp:  16   Temp:  97.8 °F (36.6 °C)   Weight: 209 lb (94.8 kg)    Height: 5' 9\" (1.753 m)         Physical Exam:  Patient without distress. Heart: Regular rate and rhythm  Lung: clear to auscultation throughout lung fields, no wheezes, no rales, no rhonchi and normal respiratory effort  Cervical Exam: 6 cm dilated    80% effaced    0 station    Presenting Part: cephalic exam in office just prior to transfer to &D  Membranes:  Intact  Fetal Heart Rate: Reactive  Accelerations: yes    Prenatal Labs:   Lab Results   Component Value Date/Time    Rubella, External Immune 02/26/2019    GrBStrep, External Positive 01/07/2021    HBsAg, External Negative 02/26/2019    HIV, External Negative 11/17/2020    Gonorrhea, External Negative 07/08/2020    Chlamydia, External Negative 07/08/2020        Assessment/Plan:     Plan: Admit for Reassuring fetal status, Labor  Progressing normally  Continue expectant management, Continue plan for vaginal delivery. Group B Strep was positive, will treat prophylactically with ampicillin.     Signed By:  Bouchra Bui MD     February 5, 2021

## 2021-02-05 NOTE — PROGRESS NOTES
Barry 56 admitted from Dr. Megan Coleman office with cervical exam 6-7cm . Pt denies wanting pain meds or epidural at this time. 1140  ucs irregular, monitor off after ampicillin given, will ambulate with . 1300  Dr. Sav Miner at bedside, sve 7 with bulging membranes. 1400  Intermittent monitoring, pt up on birthing ball, c/o back pain, heat applied. 1520  Pt sitting on birthing ball, feeling rectal pressure, Dr. Sav Miner notified and will be over soon.

## 2021-02-05 NOTE — L&D DELIVERY NOTE
Delivery Summary    Patient: Eligah Lundborg MRN: [id number]  SSN: [id number]    YOB: 1985  Age: 30 y.o. Sex: female       Information for the patient's :  Hailene Ata [[id number]]       Labor Events:    Labor: No    Steroids: None   Cervical Ripening Date/Time:       Cervical Ripening Type: None   Antibiotics During Labor: Yes   Rupture Identifier:      Rupture Date/Time: 2021 4:20 PM   Rupture Type: AROM   Amniotic Fluid Volume:      Amniotic Fluid Description: Clear    Amniotic Fluid Odor: None    Induction:         Induction Date/Time:        Indications for Induction:      Augmentation:     Augmentation Date/Time:      Indications for Augmentation:     Labor complications: Additional complications:        Delivery Events:  Indications For Episiotomy:     Episiotomy: None   Perineal Laceration(s): None   Repaired:     Periurethral Laceration Location:      Repaired:     Labial Laceration Location:     Repaired:     Sulcal Laceration Location:     Repaired:     Vaginal Laceration Location:     Repaired:     Cervical Laceration Location:     Repaired:     Repair Suture: None   Number of Repair Packets:     Estimated Blood Loss (ml):  ml   Quantitative Blood Loss (ml)                Delivery Date: 2021    Delivery Time: 6:16 PM  Delivery Type: Vaginal, Spontaneous  Sex:  Male    Gestational Age: 44w3d   Delivery Clinician:  Marnie Garrett  Living Status: Living   Delivery Location: L&D            APGARS  One minute Five minutes Ten minutes   Skin color: 0   1        Heart rate: 2   2        Grimace: 2   2        Muscle tone: 2   2        Breathin   2        Totals: 7   9            Presentation: Vertex    Position:        Resuscitation Method:  Suctioning-bulb; Tactile Stimulation     Meconium Stained: None      Cord Information:    Complications: None  Cord around:    Delayed cord clamping?  Yes  Cord clamped date/time:   Disposition of Cord Blood:     Blood Gases Sent?: No    Placenta:  Date/Time: 2021  6:24 PM  Removal: Spontaneous      Appearance:       [place] Measurements:  Birth Weight:        Birth Length:        Head Circumference:        Chest Circumference:       Abdominal Girth:      Other Providers:   WALTER CHAMPION;TYREL KAMINSKI;JAZZ LÓPEZ, Obstetrician;Primary Nurse;Primary  Nurse;Nicu Nurse;Neonatologist;Anesthesiologist;Crna;Nurse Practitioner;Midwife;Nursery Nurse           Group B Strep:   Lab Results   Component Value Date/Time    GrBStrep, External Positive 2021     Information for the patient's :  KOBI Dexter [[id number]]   No results found for: ABORH, PCTABR, PCTDIG, BILI, ABORHEXT, ABORH     No results for input(s): PCO2CB, PO2CB, HCO3I, SO2I, IBD, PTEMPI, SPECTI, PHICB, ISITE, IDEV, IALLEN in the last 72 hours.

## 2021-02-06 VITALS
WEIGHT: 209 LBS | RESPIRATION RATE: 16 BRPM | HEIGHT: 69 IN | SYSTOLIC BLOOD PRESSURE: 123 MMHG | HEART RATE: 84 BPM | TEMPERATURE: 97.9 F | DIASTOLIC BLOOD PRESSURE: 74 MMHG | BODY MASS INDEX: 30.96 KG/M2

## 2021-02-06 PROCEDURE — 74011250637 HC RX REV CODE- 250/637: Performed by: OBSTETRICS & GYNECOLOGY

## 2021-02-06 RX ORDER — IBUPROFEN 800 MG/1
800 TABLET ORAL EVERY 8 HOURS
Qty: 60 TAB | Refills: 2 | Status: SHIPPED | OUTPATIENT
Start: 2021-02-06 | End: 2022-03-03 | Stop reason: ALTCHOICE

## 2021-02-06 RX ADMIN — IBUPROFEN 800 MG: 400 TABLET, FILM COATED ORAL at 03:18

## 2021-02-06 NOTE — ROUTINE PROCESS
0730: Bedside shift change report given to ELLA Mcqueen RN (oncoming nurse) by Marilyn Coronel RN (offgoing nurse). Report included the following information SBAR.

## 2021-02-06 NOTE — PROGRESS NOTES
Postpartum Note  S/P , PPday #1  Ambulating and voiding without diff  Trung po and po meds well  Breastfeeding well established    O: VSS, Afebrile         Patient Vitals for the past 24 hrs:   Temp Pulse Resp BP   21 0951 97.9 °F (36.6 °C) 72 16 112/68   21 0317 98.4 °F (36.9 °C) 60 16 114/65   21 2220 97.9 °F (36.6 °C) 63 15 114/61   21 2120 98.1 °F (36.7 °C) 80 16 118/71   21 2041 97.5 °F (36.4 °C) 84 16 110/73   21 2024  87  120/75   21 2019  65  104/63   21 195  (!) 59  121/68   21 1939  70  131/73   21 1924  72  129/69   21 1909  70  116/64   21 1854  66  119/66   21 1838  74  129/71   21 1834  61  125/66   21 1827 98 °F (36.7 °C) (!) 57 16 109/69   21 1555 98 °F (36.7 °C) 66 18 128/77   21 1443 98.7 °F (37.1 °C) 80 20 113/70   21 1318  80  121/76   21 1101 97.8 °F (36.6 °C) 76 16 123/76           Breasts soft, NT        FF @ U-1 ML, Lochia Small Rubra        Perineum Intact        Ext NT, No REP, Neg Maciej's    A/P: Routine PP care          Maternal Education          Lactation consultation prn          Plan Discharge in [facility], АННА

## 2021-02-06 NOTE — ROUTINE PROCESS
TRANSFER - IN REPORT: 
 
Verbal report received from 4526 Christopher Rojas RN(name) on Sherry Roa  being received from [facility] (unit) for routine progression of care Report consisted of patients Situation, Background, Assessment and  
Recommendations(SBAR). Information from the following report(s) SBAR, Intake/Output and Recent Results was reviewed with the receiving nurse. Opportunity for questions and clarification was provided. Assessment completed upon patients arrival to unit and care assumed.

## 2021-02-07 NOTE — DISCHARGE INSTRUCTIONS
Patient Education        After Your Delivery (the Postpartum Period): Care Instructions  Your Care Instructions     Congratulations on the birth of your baby. Like pregnancy, the  period can be a time of excitement, hitesh, and exhaustion. You may look at your wondrous little baby and feel happy. You may also be overwhelmed by your new sleep hours and new responsibilities. At first, babies often sleep during the days and are awake at night. They do not have a pattern or routine. They may make sudden gasps, jerk themselves awake, or look like they have crossed eyes. These are all normal, and they may even make you smile. In these first weeks after delivery, try to take good care of yourself. It may take 4 to 6 weeks to feel like yourself again, and possibly longer if you had a  birth. You will likely feel very tired for several weeks. Your days will be full of ups and downs, but lots of hitesh as well. Follow-up care is a key part of your treatment and safety. Be sure to make and go to all appointments, and call your doctor if you are having problems. It's also a good idea to know your test results and keep a list of the medicines you take. How can you care for yourself at home? Take care of your body after delivery  · Use pads instead of tampons for the bloody flow that may last as long as 2 weeks. · Ease cramps with ibuprofen (Advil, Motrin). · Ease soreness of hemorrhoids and the area between your vagina and rectum with ice compresses or witch hazel pads. · Ease constipation by drinking lots of fluid and eating high-fiber foods. Ask your doctor about over-the-counter stool softeners. · Cleanse yourself with a gentle squeeze of warm water from a bottle instead of wiping with toilet paper. · Take a sitz bath in warm water several times a day. · Wear a good nursing bra. Ease sore and swollen breasts with warm, wet washcloths.   · If you are not breastfeeding, use ice rather than heat for breast soreness. · Your period may not start for several months if you are breastfeeding. You may bleed more, and longer at first, than you did before you got pregnant. · Wait until you are healed (about 4 to 6 weeks) before you have sexual intercourse. Your doctor will tell you when it is okay to have sex. · Try not to travel with your baby for 5 or 6 weeks. If you take a long car trip, make frequent stops to walk around and stretch. Avoid exhaustion  · Rest every day. Try to nap when your baby naps. · Ask another adult to be with you for a few days after delivery. · Plan for  if you have other children. · Stay flexible so you can eat at odd hours and sleep when you need to. Both you and your baby are making new schedules. · Plan small trips to get out of the house. Change can make you feel less tired. · Ask for help with housework, cooking, and shopping. Remind yourself that your job is to care for your baby. Know about help for postpartum depression  · \"Baby blues\" are common for the first 1 to 2 weeks after birth. You may cry or feel sad or irritable for no reason. · Rest whenever you can. Being tired makes it harder to handle your emotions. · Go for walks with your baby. · Talk to your partner, friends, and family about your feelings. · If your symptoms last for more than a few weeks, or if you feel very depressed, ask your doctor for help. · Postpartum depression can be treated. Support groups and counseling can help. Sometimes medicine can also help. Stay healthy  · Eat healthy foods so you have more energy and lose extra baby pounds. · If you breastfeed, avoid drugs. If you quit smoking during pregnancy, try to stay smoke-free. If you choose to have a drink now and then, have only one drink, and limit the number of occasions that you have a drink. Wait to breastfeed at least 2 hours after you have a drink to reduce the amount of alcohol the baby may get in the milk.   · Start daily exercise after 4 to 6 weeks, but rest when you feel tired. · Learn exercises to tone your belly. Do Kegel exercises to regain strength in your pelvic muscles. You can do these exercises while you stand or sit. ? Squeeze the same muscles you would use to stop your urine. Your belly and thighs should not move. ? Hold the squeeze for 3 seconds, and then relax for 3 seconds. ? Start with 3 seconds. Then add 1 second each week until you are able to squeeze for 10 seconds. ? Repeat the exercise 10 to 15 times for each session. Do three or more sessions each day. · Find a class for new mothers and new babies that has an exercise time. · If you had a  birth, give yourself a bit more time before you exercise, and be careful. When should you call for help? Call  911 anytime you think you may need emergency care. For example, call if:    · You have thoughts of harming yourself, your baby, or another person.     · You passed out (lost consciousness).     · You have chest pain, are short of breath, or cough up blood.     · You have a seizure. Call your doctor now or seek immediate medical care if:    · You have severe vaginal bleeding. This means you are passing blood clots and soaking through a pad each hour for 2 or more hours.     · You are dizzy or lightheaded, or you feel like you may faint.     · You have a fever.     · You have new or more belly pain.     · You have signs of a blood clot in your leg (called a deep vein thrombosis), such as:  ? Pain in the calf, back of the knee, thigh, or groin. ? Redness and swelling in your leg or groin.     · You have signs of preeclampsia, such as:  ? Sudden swelling of your face, hands, or feet. ? New vision problems (such as dimness, blurring, or seeing spots). ? A severe headache.    Watch closely for changes in your health, and be sure to contact your doctor if:    · Your vaginal bleeding seems to be getting heavier.     · You have new or worse vaginal discharge.     · You feel sad, anxious, or hopeless for more than a few days.     · You do not get better as expected. Where can you learn more? Go to http://www.[employer].com/  Enter A461 in the search box to learn more about \"After Your Delivery (the Postpartum Period): Care Instructions. \"  Current as of: February 11, 2020               Content Version: 12.6  © 2006-2020 [employer]. Care instructions adapted under license by [employer] (which disclaims liability or warranty for this information). If you have questions about a medical condition or this instruction, always ask your healthcare professional. [place] [address] any warranty or liability for your use of this information. Patient Education        Vaginal Childbirth: Care Instructions  Overview     Vaginal birth means delivering a baby through the birth canal (vagina). During labor, the uterus tightens (contracts) regularly to thin and open the cervix and to push the baby out through the birth canal.  Your body will slowly heal in the next few weeks. It's easy to get too tired and overwhelmed during the first weeks after your baby is born. Changes in your hormones can shift your mood without warning. You may find it hard to meet the extra demands on your energy and time. Take it easy on yourself. Follow-up care is a key part of your treatment and safety. Be sure to make and go to all appointments, and call your doctor if you are having problems. It's also a good idea to know your test results and keep a list of the medicines you take. How can you care for yourself at home? Vaginal bleeding and cramps  · After delivery, you will have a bloody discharge from your vagina. This will turn pink within a week and then white or yellow after about 10 days. It may last for 2 to 4 weeks or longer, until the uterus has healed. Use pads instead of tampons until you stop bleeding.   · Don't worry if you pass some blood clots, as long as they are smaller than a golf ball. If you have a tear or stitches in your vaginal area, change the pad at least every 4 hours. This will help prevent soreness and infection. · You may have cramps for the first few days after childbirth. These are normal and occur as the uterus shrinks to normal size. Take an over-the-counter pain medicine, such as acetaminophen (Tylenol), ibuprofen (Advil, Motrin), or naproxen (Aleve), for cramps. Read and follow all instructions on the label. Do not take aspirin, because it can cause more bleeding. · Do not take two or more pain medicines at the same time unless the doctor told you to. Many pain medicines have acetaminophen, which is Tylenol. Too much acetaminophen (Tylenol) can be harmful. Stitches  · If you have stitches, they will dissolve on their own and don't need to be removed. Follow your doctor's instructions for cleaning the stitched area. · Put ice or a cold pack on your painful area for 10 to 20 minutes at a time, several times a day, for the first few days. Put a thin cloth between the ice and your skin. · Sit in a few inches of warm water (sitz bath) 3 times a day and after bowel movements. The warm water helps with pain and itching. If you don't have a tub, a warm shower might help. Breast fullness  · Your breasts may overfill (engorge) in the first few days after delivery. To help milk flow and to relieve pain, warm your breasts in the shower or by using warm, moist towels before nursing. · If you aren't nursing, don't put warmth on your breasts or touch your breasts. Wear a tight bra or sports bra and use ice until the fullness goes away. This usually takes 2 to 3 days. · Put ice or a cold pack on your breast after nursing to reduce swelling and pain. Put a thin cloth between the ice and your skin. Activity  · Eat a balanced diet. Don't try to lose weight by cutting calories.  Keep taking your prenatal vitamins, or take a multivitamin. · Get as much rest as you can. Try to take naps when your baby sleeps during the day. · Get some exercise every day. But don't do any heavy exercise until your doctor says it is okay. · Wait until you are healed (about 4 to 6 weeks) before you have sexual intercourse. Your doctor will tell you when it is okay to have sex. · Talk to your doctor about birth control. You can get pregnant even before your period returns. Also, you can get pregnant while you are breastfeeding. Mental health  · It's normal to have some sadness, anxiety, sleeplessness, and mood swings after you go home. If you feel upset or hopeless for more than a few days or are having trouble doing the things you need to do, talk to your doctor. Constipation and hemorrhoids  · Drink plenty of fluids, enough so that your urine is light yellow or clear like water. If you have kidney, heart, or liver disease and have to limit fluids, talk with your doctor before you increase the amount of fluids you drink. · Eat plenty of fiber each day. Have a bran muffin or bran cereal for breakfast. Try eating a piece of fruit for a mid-afternoon snack. · For painful, itchy hemorrhoids, put ice or a cold pack on the area several times a day for 10 minutes at a time. Follow this by putting a warm compress on the area for another 10 to 20 minutes or by sitting in a shallow, warm bath. When should you call for help? Call  911 anytime you think you may need emergency care. For example, call if:    · You have thoughts of harming yourself, your baby, or another person.     · You passed out (lost consciousness).     · You have chest pain, are short of breath, or cough up blood.     · You have a seizure.    Call your doctor now or seek immediate medical care if:    · You have severe vaginal bleeding.     · You are dizzy or lightheaded, or you feel like you may faint.     · You have a fever.     · You have new or more pain in your belly or pelvis.     · You have symptoms of a blood clot in your leg (called a deep vein thrombosis), such as:  ? Pain in the calf, back of the knee, thigh, or groin. ? Redness and swelling in your leg or groin.     · You have signs of preeclampsia, such as:  ? Sudden swelling of your face, hands, or feet. ? New vision problems (such as dimness, blurring, or seeing spots). ? A severe headache. Watch closely for changes in your health, and be sure to contact your doctor if:    · Your vaginal bleeding seems to be getting heavier.     · You have new or worse vaginal discharge.     · You feel sad, anxious, or hopeless for more than a few days.     · You do not get better as expected. Where can you learn more? Go to http://www.gray.com/  Enter Q237 in the search box to learn more about \"Vaginal Childbirth: Care Instructions. \"  Current as of: 2020               Content Version: 12.6  © 2170-3461 [employer]. Care instructions adapted under license by [facility] (which disclaims liability or warranty for this information). If you have questions about a medical condition or this instruction, always ask your healthcare professional. [place] [address] any warranty or liability for your use of this information. POSTPARTUM DISCHARGE INSTRUCTIONS       Name:  Gerald Lee  YOB: 1985  Admission Diagnosis:  Labor without complication [Q24]     Discharge Diagnosis:    Problem List as of 2021 Date Reviewed: 2021          Codes Class Noted - Resolved    Labor without complication [id number]: S33  ICD-9-CM: 650  2021 - Present        Prenatal care of multigravida, antepartum ICD-10-CM: Z34.80  ICD-9-CM: V22.1  2020 - Present    Overview Addendum 2021  2:05 AM by Janine Bran MD     Primary Provider: Orlin Rinne P2  EDC by LMP/US  HAVING A THIRD BOY  COVID negative  Hx  x2 boys;  Darleen Meigs and Tanesha Freitas   Rony Munroe   Covid positive ; she tested negative twice  IOB labs: GBS in urine  Genetic Screening: declines all  Anatomy:  Normal   GTT:  normal  Flu: done  TDAP: done  Rhogam:  Apositive  GBS: GBS uria  Circ:                  RESOLVED: Pregnancy ICD-10-CM: Z34.90  ICD-9-CM: V22.2  9/3/2019 - 2020        RESOLVED: Labor without complication [id number]: H90  ICD-9-CM: 650  9/3/2019 - 2020        RESOLVED: Prenatal care, subsequent pregnancy in first trimester ICD-10-CM: Z34.81  ICD-9-CM: V22.1  2019 - 2020    Overview Addendum 2020 10:43 AM by Lam Pink RN     Primary Provider:  Davina Boy  P2- h/o miscarriage  EDC by 1st trim US   Rony Munroe  Having another boy  Hx  x2 boys;  [facility]; second son 7lb 11oz Dr Gricel Samayoa labs: pt left 1st visit without labs drawn; still needs all bloodwork__  Genetic Screening: Declines  Anatomy:  GTT:  Flu:__ TDAP: __  Rhogam: Apos  GBS: GBS pos in urine; declines atb; risks reviewed  Circ:  Pt is from [facility]: Prolonged spontaneous rupture of membranes ICD-10-CM: O42.90  ICD-9-CM: 658.20  2016 - 2016        RESOLVED: Normal labor ICD-10-CM: O80, Z37.9  ICD-9-CM: 288  2016 - 2016        RESOLVED: Supervision of normal first pregnancy ICD-10-CM: Z34.00  ICD-9-CM: V22.0  2016 - 2016    Overview Addendum 2016  2:03 PM by Tyree Wang MD     Transfer at 35 weeks  Marginal previa resolved. GBS POSITIVE                  Attending Physician:  Heriberto Naik MD    Delivery Type:  {Postpartum Delivery Types:03574}     Additional Information:  {Postpartum Pregnancy Complications:64978}    These are general instructions for a healthy lifestyle:    No smoking/ No tobacco products/ Avoid exposure to second hand smoke    Surgeon General's Warning:  Quitting smoking now greatly reduces serious risk to your health.     Obesity, smoking, and sedentary lifestyle greatly increases your risk for illness    A healthy diet, regular physical exercise & weight monitoring are important for maintaining a healthy lifestyle    Recognize signs and symptoms of STROKE:    F-face looks uneven    A-arms unable to move or move unevenly    S-speech slurred or non-existent    T-time-call 911 as soon as signs and symptoms begin - DO NOT go       back to bed or wait to see if you get better - TIME IS BRAIN. I have had the opportunity to make my options or choices for discharge. I have received and understand these instructions.

## 2021-02-07 NOTE — DISCHARGE SUMMARY
Obstetrical Discharge Summary     Name: Carl Mantilla MRN: [id number]  SSN: [id number]    YOB: 1985  Age: 30 y.o. Sex: female      Allergies: Patient has no known allergies. Admit Date: 2021    Discharge Date: 2021     Admitting Physician: Tay Miller MD     Attending Physician:  Gabriela Grey MD     * Admission Diagnoses: Labor without complication [O25]    * Discharge Diagnoses:   Information for the patient's :  Gloria Buckner [[id number]]   Delivery of a 7 lb 6.7 oz (3.365 kg) male infant via Vaginal, Spontaneous on 2021 at 0:00 PM  by Tay Miller. Apgars were 7  and 9 .        Additional Diagnoses:   Hospital Problems as of 2021 Date Reviewed: 2021          Codes Class Noted - Resolved POA    Labor without complication [id number]: X42  ICD-9-CM: 903  2021 - Present Unknown             Lab Results   Component Value Date/Time    Rubella, External Immune 2019    GrBStrep, External Positive 2021    ABO,Rh A positive 2016      Immunization History   Administered Date(s) Administered    Influenza Vaccine [employer]) PF (>6 Mo Flulaval, Fluarix, and >3 Yrs Afluria, Fluzone 78722) 10/26/2017, 2019, 10/21/2020    Tdap 2016, 2019, 2020       * Procedures: None  * No surgery found *      Retired Use Flowsheet (6) [place]  Depression Scale  I have been able to laugh and see the funny side of things: As much as I always could  I have looked forward with enjoyment to things: As much as I ever did  I have blamed myself unnecessarily when things went wrong: No, never  I have been anxious or worried for no good reason: No, not at all  I have felt scared or panicky for no very good reason: No, not at all  Things have been getting on top of me: No, I have been coping as well as ever  I have been so unhappy that I have had difficulty sleeping: No, not at all  I have felt sad or miserable: No, not at all  I have been so unhappy that I have been crying: No, never  The thought of harming myself has occurred to me: Never  Total Score: 0    * Discharge Condition: good    [facility] Course: Normal hospital course following the delivery. * Disposition: Home    Discharge Medications:   Current Discharge Medication List          * Follow-up Care/Patient Instructions:   Activity: Activity as tolerated  Diet: Regular Diet  Wound Care: None needed    Follow-up Information     Follow up With Specialties Details Why Contact Info    Trina Giang MD Obstetrics & Gynecology, Gynecology, Obstetrics In 6 weeks  [address] [phone]             [facility], [address] [place] [address]

## 2021-02-07 NOTE — ROUTINE PROCESS
2000- Bedside shift change report given to ОЛЕГ Park RN (oncoming nurse) by Elaine Mcqueen RN (offgoing nurse). Report included the following information SBAR.

## 2021-02-17 ENCOUNTER — OFFICE VISIT (OUTPATIENT)
Dept: INTERNAL MEDICINE CLINIC | Age: 36
End: 2021-02-17
Payer: COMMERCIAL

## 2021-02-17 VITALS
TEMPERATURE: 98.2 F | OXYGEN SATURATION: 97 % | DIASTOLIC BLOOD PRESSURE: 81 MMHG | RESPIRATION RATE: 16 BRPM | HEART RATE: 81 BPM | BODY MASS INDEX: 28.73 KG/M2 | WEIGHT: 194 LBS | SYSTOLIC BLOOD PRESSURE: 118 MMHG | HEIGHT: 69 IN

## 2021-02-17 DIAGNOSIS — R03.0 ELEVATED BLOOD PRESSURE READING IN OFFICE WITH WHITE COAT SYNDROME, WITHOUT DIAGNOSIS OF HYPERTENSION: ICD-10-CM

## 2021-02-17 DIAGNOSIS — J45.20 MILD INTERMITTENT REACTIVE AIRWAY DISEASE WITHOUT COMPLICATION: Primary | ICD-10-CM

## 2021-02-17 PROCEDURE — 99214 OFFICE O/P EST MOD 30 MIN: CPT | Performed by: INTERNAL MEDICINE

## 2021-02-17 RX ORDER — ALBUTEROL SULFATE 90 UG/1
1 AEROSOL, METERED RESPIRATORY (INHALATION)
Qty: 1 INHALER | Refills: 0 | Status: SHIPPED | OUTPATIENT
Start: 2021-02-17 | End: 2021-04-12 | Stop reason: ALTCHOICE

## 2021-02-17 NOTE — PROGRESS NOTES
Diagnoses and all orders for this visit:    Diagnoses and all orders for this visit:    1. Mild intermittent reactive airway disease without complication  New problem  Unclear cause and possibly due to reactive airway from chronic cough 2 months ago versus postnasal drip versus reflux  As she is now postpartum her reflux symptoms are resolving but her lungs are still reactive and on exam consistent with some postnasal drip  Given her breast-feeding status discussed and performed her not use any oral medication so will use Flonase and albuterol  -     albuterol (PROVENTIL HFA, VENTOLIN HFA, PROAIR HFA) 90 mcg/actuation inhaler; Take 1 Puff by inhalation every four (4) hours as needed for Wheezing. 2. Elevated blood pressure reading in office with white coat syndrome, without diagnosis of hypertension  On recheck patient's blood pressure was 140/90. She is postpartum 2 weeks and likely blood pressure will decrease with weight loss from breast-feeding  She will monitor    3. Patient is a currently breast-feeding mother  Discussed with patient and will avoid any oral meds or antihistamines which could affect flow so will avoid Zyrtec/Claritin or Allegra for now             Chief Complaint   Patient presents with    Cough     been going on for two months - has 5 negative covid test -     Hypertension     ever since she had her baby      Patient presents for cough and history of hypertension while in the hospital.    Cough  Patient with that she has had a persistent cough since December 2020. She notes that she is exposed to a possible virus prior to her symptoms. She was Covid negative. She developed a hacking cough. She never received antibiotics. She was in her third trimester when her cough started and 2 weeks ago she delivered her son. She notes her cough is a little bit better since she is no longer pregnant but it is still persisting.   She notes that she has some congestion in her upper chest.  She does not have any wheezing or shortness of breath. She notes she does have some postnasal drip. She is breast-feeding and plans to continue. Blood pressure without history of hypertension  Patient reports that her blood pressure was 140/90 in the hospital while she was delivering. She was never diagnosed with preeclampsia. She has checked her blood pressure and is in the 130-140/80-90 range.     2 months ago  Saul   tdap uptodate    Started with uri 2.5 months ago  Dec 18 took covid tests  + with no sx    Chronic bronchitis  No fevers  No sx  No otc  Breastfeeding    No wheezing  Occasionally pnd  Sx worse in the am and not as bad in the evening        2nd pregnancy had an arrythmia  Cardiologist checked    No htn prior to delivery  She checks bp 140/90 closer to delivery  Normal since delovery  Past Medical History:   Diagnosis Date    Abnormal Papanicolaou smear of cervix     colposcopy, dr. Pj eckert    Arrhythmia     Pap smear for cervical cancer screening 05/03/2017 neg HPV POS    Vaginal delivery 02/05/2021     Past Surgical History:   Procedure Laterality Date    HX HEENT      wisdom teeth extraction    HX HEENT      nasal fracture repair    HX OTHER SURGICAL  09/03/2019    vaginal delivery      Social History     Socioeconomic History    Marital status:      Spouse name: Not on file    Number of children: Not on file    Years of education: Not on file    Highest education level: Not on file   Tobacco Use    Smoking status: Never Smoker    Smokeless tobacco: Never Used   Substance and Sexual Activity    Alcohol use: Not Currently     Frequency: Never     Comment: occasional    Drug use: No    Sexual activity: Yes     Partners: Male   Other Topics Concern   Social History Narrative    Full time  for private school    Manageable stress              healthy        3 children    2 weeks    1.5 months    3 yo son         Family History Problem Relation Age of Onset    No Known Problems Father     Elevated Lipids Mother      Current Outpatient Medications   Medication Sig Dispense Refill    prenatal 02/BWSQ ps/folic/dha (PNV-FIRST PO) Take  by mouth.  ibuprofen (MOTRIN) 800 mg tablet Take 1 Tab by mouth every eight (8) hours.  Indications: pain 60 Tab 2     No Known Allergies    Review of Systems - General ROS: negative for - chills or fever  Cardiovascular ROS: no chest pain or dyspnea on exertion  Respiratory ROS: no, shortness of breath, or wheezing    Visit Vitals  /81 (BP 1 Location: Left upper arm, BP Patient Position: Sitting, BP Cuff Size: Adult)   Pulse 81   Temp 98.2 °F (36.8 °C) (Oral)   Resp 16   Ht 5' 9\" (1.753 m)   Wt 194 lb (88 kg)   LMP 04/28/2020 (Exact Date)   SpO2 97%   BMI 28.65 kg/m²     Constitutional: [x] Appears well-developed and well-nourished [x] No apparent distress      [] Abnormal -     Mental status: [x] Alert and awake  [x] Oriented to person/place/time [x] Able to follow commands    [] Abnormal -     Eyes:   EOM    [x]  Normal    [] Abnormal -   Sclera  [x]  Normal    [] Abnormal -          Discharge [x]  None visible   [] Abnormal -     HENT: [x] Normocephalic, atraumatic  [] Abnormal -   [x] Mouth/Throat: Mucous membranes are moist    External Ears [x] Normal  [] Abnormal -    Neck: [x] No visualized mass [] Abnormal -     Pulmonary/Chest: [x] Respiratory effort normal   [x] No visualized signs of difficulty breathing or respiratory distress        [] Abnormal -      Musculoskeletal:   [x] Normal gait with no signs of ataxia         [x] Normal range of motion of neck        [] Abnormal -     Neurological:        [x] No Facial Asymmetry (Cranial nerve 7 motor function) (limited exam due to video visit)          [x] No gaze palsy        [] Abnormal -          Skin:        [x] No significant exanthematous lesions or discoloration noted on facial skin         [] Abnormal -            Psychiatric: [x] Normal Affect [] Abnormal -        [x] No Hallucinations      ATTENTION:   This medical record was transcribed using an electronic medical records/speech recognition system. Although proofread, it may and can contain electronic, spelling and other errors. Corrections may be executed at a later time. Please contact us for any clarifications as needed.

## 2021-03-04 ENCOUNTER — TELEPHONE (OUTPATIENT)
Dept: INTERNAL MEDICINE CLINIC | Age: 36
End: 2021-03-04

## 2021-03-04 NOTE — TELEPHONE ENCOUNTER
----- Message from Jonnie Snell sent at 3/4/2021  7:16 AM EST -----  Regarding: Dr. Debbi Maher  Appointment not available    Caller's first and last name and relationship to patient (if not the patient):      Best contact number: [phone]      Preferred date and time:today      Scheduled appointment date and time:      Reason for appointment:back pain      Details to clarify the request:Pt stated she has back pain and is having difficulty moving, and requested a virtual appt for today.       Jonnie Snell

## 2021-03-04 NOTE — TELEPHONE ENCOUNTER
I spoke with patient, she thinks she threw her back out yesterday. She is at the [facility] now. She would like a VV to discuss medication to help her sx. No available appts today. VV schedule for tomorrow.

## 2021-03-05 ENCOUNTER — VIRTUAL VISIT (OUTPATIENT)
Dept: INTERNAL MEDICINE CLINIC | Age: 36
End: 2021-03-05
Payer: COMMERCIAL

## 2021-03-05 DIAGNOSIS — M54.50 ACUTE BILATERAL LOW BACK PAIN WITHOUT SCIATICA: Primary | ICD-10-CM

## 2021-03-05 PROCEDURE — 99441 PR PHYS/QHP TELEPHONE EVALUATION 5-10 MIN: CPT | Performed by: INTERNAL MEDICINE

## 2021-03-05 RX ORDER — CYCLOBENZAPRINE HCL 5 MG
5 TABLET ORAL
Qty: 20 TAB | Refills: 0 | Status: SHIPPED | OUTPATIENT
Start: 2021-03-05 | End: 2021-04-12 | Stop reason: ALTCHOICE

## 2021-03-05 NOTE — PROGRESS NOTES
Nancy Santana is a 30 y.o. female evaluated via audio only technology on 3/5/2021. Consent: She and/or her health care decision maker is aware that she may receive a bill for this audio only encounter, depending on her insurance coverage, and has provided verbal consent to proceed: Yes    I communicated with the patient and/or health care decision maker about the nature and details of the following: Back pain    I affirm this is a Patient-Initiated Episode with a Patient who has not had a related appointment within my department in the past 7 days or scheduled within the next 24 hours. Total Time: minutes: 5-10 minutes    The patient is located in [place] during this visit    Assessment and Plan   Diagnoses and all orders for this visit:    1. Acute bilateral low back pain without sciatica  -     cyclobenzaprine (FLEXERIL) 5 mg tablet; Take 1 Tab by mouth three (3) times daily as needed for Muscle Spasm(s). Reports 2 days ago, she was lifting her kids when she had acute onset low back pain. She has a history of slipped disc in the past.  She saw her chiropractor and had some improvement in her symptoms. However, she recently bent over to put her shoes on and her pain acutely got worse. Has been using ibuprofen    Also recommend using Flexeril. Discussed risk of sedation. Advised not to drive on medication. Also recommend gentle exercises. Advised to call if symptoms do not improve      We discussed the expected course, resolution and complications of the diagnosis(es) in detail. Medication risks, benefits, costs, interactions, and alternatives were discussed as indicated. I advised her to contact the office if her condition worsens, changes or fails to improve as anticipated. She expressed understanding with the diagnosis(es) and plan.      Return to clinic: As needed    Alexis Strickland MD  [facility]  3/5/2021    Future Appointments   Date Time Provider Department Center   3/15/2021 10:00 AM Roberta Figueroa MD [facility]        Subjective   Chief Complaint   Back pain    Sybil Dexter is a 35 y.o. female         Objective   Vitals:       No flowsheet data found.              Physical Exam  Constitutional:       Appearance: Normal appearance. She is not ill-appearing.   Pulmonary:      Effort: No respiratory distress.   Neurological:      Mental Status: She is alert.

## 2021-04-12 ENCOUNTER — OFFICE VISIT (OUTPATIENT)
Dept: OBGYN CLINIC | Age: 36
End: 2021-04-12
Payer: COMMERCIAL

## 2021-04-12 VITALS
DIASTOLIC BLOOD PRESSURE: 70 MMHG | WEIGHT: 190 LBS | SYSTOLIC BLOOD PRESSURE: 120 MMHG | HEIGHT: 69 IN | BODY MASS INDEX: 28.14 KG/M2

## 2021-04-12 DIAGNOSIS — Z01.419 WELL WOMAN EXAM: ICD-10-CM

## 2021-04-12 DIAGNOSIS — Z30.430 ENCOUNTER FOR IUD INSERTION: Primary | ICD-10-CM

## 2021-04-12 LAB
HCG URINE, QL. (POC): NEGATIVE
VALID INTERNAL CONTROL?: YES

## 2021-04-12 PROCEDURE — 0503F POSTPARTUM CARE VISIT: CPT | Performed by: OBSTETRICS & GYNECOLOGY

## 2021-04-12 PROCEDURE — 81025 URINE PREGNANCY TEST: CPT | Performed by: OBSTETRICS & GYNECOLOGY

## 2021-04-12 PROCEDURE — 58300 INSERT INTRAUTERINE DEVICE: CPT | Performed by: OBSTETRICS & GYNECOLOGY

## 2021-04-12 NOTE — PROGRESS NOTES
Postpartum evaluation    Beronica Becerra is a 30 y.o. female who presents for a postpartum exam.     She is now 8weeks post normal spontaneous vaginal delivery. Her baby is doing well. She has had no menses since delivery. She has had the following significant problems since her delivery: none    The patient is breast feeding without difficulty. The patient would like to use IUD for birth control. -to be done today- mirena     She is currently taking: no medications. She is due for her next AE in NOW months.      Visit Vitals  Ht 5' 9\" (1.753 m)   Wt 190 lb (86.2 kg)   BMI 28.06 kg/m²       PHYSICAL EXAMINATION    Constitutional  · Appearance: well-nourished, well developed, alert, in no acute distress    HENT  · Head and Face: appears normal    Gastrointestinal  · Abdominal Examination: abdomen non-tender to palpation, normal bowel sounds, no masses present  · Liver and spleen: no hepatomegaly present, spleen not palpable  · Hernias: no hernias identified    Genitourinary  · External Genitalia: normal appearance for age, no discharge present, no tenderness present, no inflammatory lesions present, no masses present, no atrophy present  · Vagina: normal vaginal vault without central or paravaginal defects, no discharge present, no inflammatory lesions present, no masses present  · Bladder: non-tender to palpation  · Urethra: appears normal  · Cervix: normal   · Uterus: normal size, shape and consistency  · Adnexa: no adnexal tenderness present, no adnexal masses present  · Perineum: perineum within normal limits, no evidence of trauma, no rashes or skin lesions present  · Anus: anus within normal limits, no hemorrhoids present  · Inguinal Lymph Nodes: no lymphadenopathy present    Skin  · General Inspection: no rash, no lesions identified    Neurologic/Psychiatric  · Mental Status:  · Orientation: grossly oriented to person, place and time  · Mood and Affect: mood normal, affect appropriate    Assessment:  Normal postpartum check    Plan:  Patient declines presence of chaperone during today's visit. RTO for AE. Mirena IUD INSERTION  Indications:  Stephany Patterson is a ,  30 y.o. female WHITE No LMP recorded. (Menstrual status: Breastfeeding). Her LMP was normal in duration and amount of flow. She  presents for insertion of an IUD. The risks, benefits and alternatives of IUD insertion were discussed in detail at last visit. She also has reviewed appropriate IUD information. She has elected to proceed with the insertion today and she states she has no further questions. A urine pregnancy test was negative No components found for: SPEP, UPEP  Procedure: The pelvic exam revealed normal external genitalia. On bimanual exam the uterus was midposition and normal in size with no tenderness present. A speculum was inserted into the vagina and the cervix was visualized. The cervix was prepped with a betadine solution. The anterior lip of the cervix was grasped with an Allis tenaculum. The uterus was sounded with a Stephen sound to 8centimeters. A Mirena IUD was then inserted without difficulty. The string was cut to 3 centimeters. She experienced a minimal amount of cramping. Post Procedure Status: The patient was observed for 5 minutes after the insertion. There were no complications. Patient was given postop instructions and instructed to check on IUD string in 1 to 2 months or to have IUD check here in the office. Patient was discharged in stable condition.

## 2021-04-12 NOTE — PATIENT INSTRUCTIONS
Pelvic Exam: Care Instructions Overview When your doctor examines your pelvic organs, it's called a pelvic exam. This exam is done to evaluate symptoms, such as pelvic pain or abnormal vaginal bleeding and discharge. It may also be done to collect samples of cells for cervical cancer screening. Before your exam, it's important to share some information with your doctor. You can talk about any concerns you may have. Your doctor will also want to know if you are pregnant or use birth control. And your doctor will want to hear about any problems, surgeries, or procedures you have had in your pelvic area. You will also need to tell your doctor when your last period was. Follow-up care is a key part of your treatment and safety. Be sure to make and go to all appointments, and call your doctor if you are having problems. It's also a good idea to know your test results and keep a list of the medicines you take. How is a pelvic exam done? · During a pelvic exam, you will: ? Take off your clothes below the waist. You will get a paper or cloth cover to put over the lower half of your body. ? Lie on your back on an exam table with your feet and legs supported by footrests. · The doctor may: ? Ask you to relax your knees. Your knees need to lean out, toward the walls. ? Check the opening of your vagina for sores or swelling. ? Gently put a tool called a speculum into your vagina. It opens the vagina a little bit. You may feel some pressure. The speculum lets your doctor see inside the vagina. ? Use a small brush, spatula, or swab to get a sample for testing. The doctor then removes the speculum. ? Put on gloves and put one or two fingers of one hand into your vagina. The other hand goes on your lower belly. This lets your doctor feel your pelvic organs. You will probably feel some pressure. ? Put one gloved finger into your rectum and one into your vagina, if needed.  This can also help check your pelvic organs. You may have a small amount of vaginal discharge or bleeding after the exam. 
Why is a pelvic exam done? A pelvic exam may be done: · To collect samples of cells for cervical cancer screening. · To check for vaginal infection. · To check for sexually transmitted infections, such as chlamydia or herpes. · To help find the cause of abnormal uterine bleeding. · To look for problems like uterine fibroids, ovarian cysts, or uterine prolapse. · To help find the cause of pelvic or belly pain. · Before inserting an intrauterine device (IUD). · To collect evidence if you've been sexually assaulted. What are the risks of a pelvic exam? 
There is a small chance that the doctor will find something on a pelvic exam that would not have caused a problem. This is called overdiagnosis. It could lead to tests or treatment you don't need. When should you call for help? Watch closely for changes in your health, and be sure to contact your doctor if you have any problems. Where can you learn more? Go to http://www.gray.com/ Enter G028 in the search box to learn more about \"Pelvic Exam: Care Instructions. \" Current as of: July 17, 2020               Content Version: 12.8 © 7001-2357 [employer] Care instructions adapted under license by [employer] (which disclaims liability or warranty for this information). If you have questions about a medical condition or this instruction, always ask your healthcare professional. [place] [address] any warranty or liability for your use of this information.

## 2021-04-15 LAB
CYTOLOGIST CVX/VAG CYTO: NORMAL
CYTOLOGY CVX/VAG DOC CYTO: NORMAL
CYTOLOGY CVX/VAG DOC THIN PREP: NORMAL
DX ICD CODE: NORMAL
LABCORP, 190119: NORMAL
Lab: NORMAL
Lab: NORMAL
OTHER STN SPEC: NORMAL
STAT OF ADQ CVX/VAG CYTO-IMP: NORMAL

## 2021-04-16 NOTE — PROGRESS NOTES
This pap smear is not concerning especially because your last several paps have been all normal.  Low cellularity is much more common when you have recently delivered. No abnormal cells were appreciated. Its safe to simply repeat a pap smear next year.

## 2021-04-27 ENCOUNTER — OFFICE VISIT (OUTPATIENT)
Dept: INTERNAL MEDICINE CLINIC | Age: 36
End: 2021-04-27
Payer: COMMERCIAL

## 2021-04-27 VITALS
BODY MASS INDEX: 26.96 KG/M2 | RESPIRATION RATE: 16 BRPM | TEMPERATURE: 98 F | DIASTOLIC BLOOD PRESSURE: 88 MMHG | SYSTOLIC BLOOD PRESSURE: 142 MMHG | OXYGEN SATURATION: 98 % | HEIGHT: 69 IN | WEIGHT: 182 LBS | HEART RATE: 80 BPM

## 2021-04-27 DIAGNOSIS — F41.9 ANXIETY: Primary | ICD-10-CM

## 2021-04-27 PROCEDURE — 99215 OFFICE O/P EST HI 40 MIN: CPT | Performed by: INTERNAL MEDICINE

## 2021-04-27 RX ORDER — SERTRALINE HYDROCHLORIDE 25 MG/1
25 TABLET, FILM COATED ORAL DAILY
Qty: 30 TAB | Refills: 1 | Status: SHIPPED | OUTPATIENT
Start: 2021-04-27 | End: 2021-05-25 | Stop reason: SDUPTHER

## 2021-04-27 NOTE — PROGRESS NOTES
Assessment and Plan   Diagnoses and all orders for this visit:    1. Anxiety  -     sertraline (ZOLOFT) 25 mg tablet; Take 1 Tab by mouth daily. Indications: anxiety  3 months postpartum. Reports anxiety after her previous pregnancies but has been very severe since the birth of her third child 3 months ago. Developed persistent dizziness 2 weeks ago and has had panic attacks with chest pain, shortness of breath, severe anxiety. Feels like she is off balance. Feels this even when sitting. Symptoms are better with activity. Reports mild right ear pain. No headaches, fever, chills. Head movements do make symptoms worse. Initially started about a week after she had the Covid vaccine. This resolved quickly but then returned and has been persistent since then. Denies any depression. Feels constant worry about her children and her situation. Tearful and anxious during her visit today. Neuro exam normal.  Cardiac exam normal.  Symptoms most likely related to severe anxiety. Recommend starting Zoloft. Advised to call if she has any issues prior to her next visit. Benefits, risks, possible drug interactions, and side effects of all new medications were reviewed with the patient. Pt verbalized understanding. Return to clinic: 1 month for medication follow-up    An electronic signature was used to authenticate this note. Michael Bravo MD  [facility]  4/27/2021    Future Appointments   Date Time Provider Ludwig Marc   5/25/2021  1:40 PM Zuly Lovett MD [facility]      On this date 04/27/2021 I have spent >40 minutes reviewing previous notes, test results and face to face with the patient discussing the diagnosis and importance of compliance with the treatment plan as well as documenting on the day of the visit.    Subjective   Chief Complaint   Anxiety    Ernestine Cordon is a 30 y.o. female           Objective   Vitals:       Visit Vitals  BP (!) 142/88 (BP 1 Location: Left upper arm, BP Patient Position: Sitting, BP Cuff Size: Adult)   Pulse 80   Temp 98 °F (36.7 °C) (Oral)   Resp 16   Ht 5' 9\" (1.753 m)   Wt 182 lb (82.6 kg)   LMP 03/29/2021 (Approximate)   SpO2 98%   Breastfeeding Yes   BMI 26.88 kg/m²        Physical Exam  Constitutional:       Appearance: She is well-developed. Comments: Anxious. Laid down on the table during part of her visit   HENT:      Right Ear: Tympanic membrane and external ear normal.      Left Ear: Tympanic membrane and external ear normal.      Ears:      Comments: Mildly erythematous canals bilaterally  Eyes:      Extraocular Movements: Extraocular movements intact. Conjunctiva/sclera: Conjunctivae normal.   Neck:      Musculoskeletal: Neck supple. Cardiovascular:      Rate and Rhythm: Normal rate and regular rhythm. Pulses: Normal pulses. Heart sounds: No murmur. No friction rub. No gallop. Pulmonary:      Effort: No respiratory distress. Breath sounds: No wheezing, rhonchi or rales. Abdominal:      General: Bowel sounds are normal. There is no distension. Palpations: Abdomen is soft. There is no hepatomegaly, splenomegaly or mass. Tenderness: There is no abdominal tenderness. There is no guarding. Skin:     General: Skin is warm. Findings: No rash. Neurological:      Mental Status: She is alert and oriented to person, place, and time. Cranial Nerves: No cranial nerve deficit. Sensory: No sensory deficit. Motor: No weakness. Coordination: Coordination normal.      Gait: Gait normal.   Psychiatric:      Comments: Tearful          Current Outpatient Medications   Medication Sig    sertraline (ZOLOFT) 25 mg tablet Take 1 Tab by mouth daily. Indications: anxiety    prenatal 28/ECHJ ps/folic/dha (PNV-FIRST PO) Take  by mouth.  ibuprofen (MOTRIN) 800 mg tablet Take 1 Tab by mouth every eight (8) hours.  Indications: pain     No current facility-administered medications for this visit.

## 2021-05-06 RX ORDER — AZITHROMYCIN 250 MG/1
TABLET, FILM COATED ORAL
Qty: 6 TAB | Refills: 0 | Status: SHIPPED | OUTPATIENT
Start: 2021-05-06 | End: 2021-05-11

## 2021-05-25 ENCOUNTER — OFFICE VISIT (OUTPATIENT)
Dept: INTERNAL MEDICINE CLINIC | Age: 36
End: 2021-05-25
Payer: COMMERCIAL

## 2021-05-25 VITALS
RESPIRATION RATE: 14 BRPM | OXYGEN SATURATION: 97 % | HEART RATE: 79 BPM | BODY MASS INDEX: 27.25 KG/M2 | DIASTOLIC BLOOD PRESSURE: 91 MMHG | WEIGHT: 184 LBS | TEMPERATURE: 97.8 F | SYSTOLIC BLOOD PRESSURE: 136 MMHG | HEIGHT: 69 IN

## 2021-05-25 DIAGNOSIS — F41.9 ANXIETY: ICD-10-CM

## 2021-05-25 PROCEDURE — 99213 OFFICE O/P EST LOW 20 MIN: CPT | Performed by: INTERNAL MEDICINE

## 2021-05-25 RX ORDER — SERTRALINE HYDROCHLORIDE 25 MG/1
25 TABLET, FILM COATED ORAL DAILY
Qty: 30 TABLET | Refills: 1 | Status: SHIPPED | OUTPATIENT
Start: 2021-05-25 | End: 2021-06-24 | Stop reason: SDUPTHER

## 2021-05-25 NOTE — PROGRESS NOTES
Diagnoses and all orders for this visit:    1. Anxiety  12 weeks postpartum  PHQ 2 score is 0  BERNARD-7 score is 18 consistent with severe anxiety flare    History of panic attacks about once per week  -     sertraline (ZOLOFT) 25 mg tablet; Take 1 Tablet by mouth daily. Indications: anxiety    Side effects discussed regarding sertraline     She will follow-up with me with a IMRSV message in 2 weeks. Follow-up with me in 1 month in office or virtual visit but if feeling better can follow-up with me in 3 months. Chief Complaint   Patient presents with    Follow-up     No concerns      Anxiety  Patient reports symptoms are still present. She did not take sertraline yet. She has had  3-4 panic attacks in the last month. Her BERNARD 7 score is 18. Her  notes that she has significant anxiety as well. Patient reports that she had the Covid vaccine after delivering her baby and then developed symptoms. She notes she had anxiety about taking the Covid vaccine and after getting the vaccine her anxiety symptoms got significantly worse. She has fatigue when waking up. She is currently not breast-feeding. Labs reviewed with patient. She is concerned she may have a brain tumor. Blood pressure without history of hypertension  Patient reports her blood pressure is normal at home. 120/70. She has had some recent stress with her mother diagnosed with atrial fibrillation.   Past Medical History:   Diagnosis Date    Abnormal Papanicolaou smear of cervix     colposcopy, dr. Nat Wang Pap smear for cervical cancer screening 05/03/2017 neg HPV POS    Vaginal delivery 02/05/2021     Past Surgical History:   Procedure Laterality Date    HX HEENT      wisdom teeth extraction    HX HEENT      nasal fracture repair    HX OTHER SURGICAL  09/03/2019    vaginal delivery      Social History     Socioeconomic History    Marital status:      Spouse name: Not on file    Number of children: Not on file    Years of education: Not on file    Highest education level: Not on file   Tobacco Use    Smoking status: Never Smoker    Smokeless tobacco: Never Used   Vaping Use    Vaping Use: Never used   Substance and Sexual Activity    Alcohol use: Not Currently     Comment: occasional    Drug use: No    Sexual activity: Yes     Partners: Male   Other Topics Concern   Social History Narrative    Full time  for [employer]    Manageable stress              healthy        3 children    2 weeks    1.5 months    3 yo son         Social Determinants of Health     Financial Resource Strain:     Difficulty of Paying Living Expenses:    Food Insecurity:     Worried About [address] in the Last Year:     [address] [employer] in the Last Year:    Transportation Needs:     Lack of Transportation (Medical):  Lack of Transportation (Non-Medical):    Physical Activity:     Days of Exercise per Week:     Minutes of Exercise per Session:    Stress:     Feeling of Stress :    Social Connections:     Frequency of Communication with Friends and Family:     Frequency of Social Gatherings with Friends and Family:     Attends Muslim Services:     Active Member of Clubs or Organizations:     Attends Club or Organization Meetings:     Marital Status:      Family History   Problem Relation Age of Onset    No Known Problems Father     Elevated Lipids Mother      Current Outpatient Medications   Medication Sig Dispense Refill    sertraline (ZOLOFT) 25 mg tablet Take 1 Tab by mouth daily. Indications: anxiety 30 Tab 1    prenatal 03/PCCU ps/folic/dha (PNV-FIRST PO) Take  by mouth.  ibuprofen (MOTRIN) 800 mg tablet Take 1 Tab by mouth every eight (8) hours.  Indications: pain 60 Tab 2     No Known Allergies    Review of Systems - General ROS: negative for - chills or fever  Cardiovascular ROS: no chest pain or dyspnea on exertion  Respiratory ROS: no cough, shortness of breath, or wheezing    Visit Vitals  BP (!) 136/91 (BP 1 Location: Left upper arm, BP Patient Position: Sitting, BP Cuff Size: Adult)   Pulse 79   Temp 97.8 °F (36.6 °C) (Oral)   Resp 14   Ht 5' 9\" (1.753 m)   Wt 184 lb (83.5 kg)   SpO2 97%   BMI 27.17 kg/m²     Constitutional: [x] Appears well-developed and well-nourished [x] No apparent distress      [] Abnormal -     Mental status: [x] Alert and awake  [x] Oriented to person/place/time [x] Able to follow commands    [] Abnormal -     Eyes:   EOM    [x]  Normal    [] Abnormal -   Sclera  [x]  Normal    [] Abnormal -          Discharge [x]  None visible   [] Abnormal -     HENT: [x] Normocephalic, atraumatic  [] Abnormal -   [x] Mouth/Throat: Mucous membranes are moist    External Ears [x] Normal  [] Abnormal -    Neck: [x] No visualized mass [] Abnormal -     Pulmonary/Chest: [x] Respiratory effort normal   [x] No visualized signs of difficulty breathing or respiratory distress        [] Abnormal -      Musculoskeletal:   [x] Normal gait with no signs of ataxia         [x] Normal range of motion of neck        [] Abnormal -     Neurological:        [x] No Facial Asymmetry (Cranial nerve 7 motor function) (limited exam due to video visit)          [x] No gaze palsy        [] Abnormal -          Skin:        [x] No significant exanthematous lesions or discoloration noted on facial skin         [] Abnormal -            Psychiatric:       [x] Normal Affect [] Abnormal -        [x] No Hallucinations      ATTENTION:   This medical record was transcribed using an electronic medical records/speech recognition system. Although proofread, it may and can contain electronic, spelling and other errors. Corrections may be executed at a later time. Please contact us for any clarifications as needed. On this date 05/25/21  I have spent 28 minutes reviewing previous notes, test results and face to face with the patient discussing the diagnosis and importance of compliance with the treatment plan as well as documenting on the day of the visit.

## 2021-06-24 ENCOUNTER — OFFICE VISIT (OUTPATIENT)
Dept: INTERNAL MEDICINE CLINIC | Age: 36
End: 2021-06-24
Payer: COMMERCIAL

## 2021-06-24 VITALS
BODY MASS INDEX: 26.66 KG/M2 | HEIGHT: 69 IN | WEIGHT: 180 LBS | DIASTOLIC BLOOD PRESSURE: 80 MMHG | HEART RATE: 81 BPM | TEMPERATURE: 97.7 F | RESPIRATION RATE: 14 BRPM | OXYGEN SATURATION: 98 % | SYSTOLIC BLOOD PRESSURE: 124 MMHG

## 2021-06-24 DIAGNOSIS — F41.9 ANXIETY: ICD-10-CM

## 2021-06-24 PROCEDURE — 99213 OFFICE O/P EST LOW 20 MIN: CPT | Performed by: INTERNAL MEDICINE

## 2021-06-24 RX ORDER — SERTRALINE HYDROCHLORIDE 50 MG/1
50 TABLET, FILM COATED ORAL DAILY
Qty: 90 TABLET | Refills: 0 | Status: SHIPPED | OUTPATIENT
Start: 2021-06-24 | End: 2021-10-01 | Stop reason: SDUPTHER

## 2021-06-24 NOTE — PROGRESS NOTES
Diagnoses and all orders for this visit:    1. Anxiety  Patient doing well on increase of sertraline to 50 mg  Continue dose  We will check labs as she has not had since her pregnancy  Not been compliant with her prenatal vitamins and has been nursing  Replete as needed  -     METABOLIC PANEL, COMPREHENSIVE; Future  -     CBC W/O DIFF; Future  -     VITAMIN B12 & FOLATE; Future  -     TSH 3RD GENERATION; Future  -     sertraline (ZOLOFT) 50 mg tablet; Take 1 Tablet by mouth daily. Indications: anxiety  -     LIPID PANEL; Future      Blood pressure is improved on recheck    Chief Complaint   Patient presents with    Anxiety     1 mo f/u    Medication Evaluation         Anxiety  She reports that she has been tolerating sertraline 25 mg. She increased recently to 50 mg and been doing well. She notes she still has some anxiety but it is overall well controlled. Her symptoms are significantly better than before. Her BERNARD 2 score is 1 and her PHQ 2 score is 0 consistent with good control on anxiety and depression scales. Energy level is appropriate given her 3month-old and breast-feeding as well as 2 young boys. She has not been compliant with her prenatal vitamins. Background  Patient reports symptoms are still present. She did not take sertraline yet. She has had  3-4 panic attacks in the last month. Her BERNARD 7 score is 18. Her  notes that she has significant anxiety as well. Patient reports that she had the Covid vaccine after delivering her baby and then developed symptoms. She notes she had anxiety about taking the Covid vaccine and after getting the vaccine her anxiety symptoms got significantly worse. She has fatigue when waking up. She is currently not breast-feeding. Labs reviewed with patient. She is concerned she may have a brain tumor.       Past Medical History:   Diagnosis Date    Abnormal Papanicolaou smear of cervix     colposcopy, dr. Emerald Dorantes Pap smear for cervical cancer screening 05/03/2017 neg HPV POS    Vaginal delivery 02/05/2021     Past Surgical History:   Procedure Laterality Date    HX HEENT      wisdom teeth extraction    HX HEENT      nasal fracture repair    HX OTHER SURGICAL  09/03/2019    vaginal delivery      Social History     Socioeconomic History    Marital status:      Spouse name: Not on file    Number of children: Not on file    Years of education: Not on file    Highest education level: Not on file   Tobacco Use    Smoking status: Never Smoker    Smokeless tobacco: Never Used   Vaping Use    Vaping Use: Never used   Substance and Sexual Activity    Alcohol use: Not Currently     Comment: occasional    Drug use: No    Sexual activity: Yes     Partners: Male   Other Topics Concern   Social History Narrative    Full time  for [employer]    Manageable stress              healthy        3 children    2 weeks    1.5 months    3 yo son         Social Determinants of Health     Financial Resource Strain:     Difficulty of Paying Living Expenses:    Food Insecurity:     Worried About [address] in the Last Year:     [address] [employer] in the Last Year:    Transportation Needs:     Lack of Transportation (Medical):      Lack of Transportation (Non-Medical):    Physical Activity:     Days of Exercise per Week:     Minutes of Exercise per Session:    Stress:     Feeling of Stress :    Social Connections:     Frequency of Communication with Friends and Family:     Frequency of Social Gatherings with Friends and Family:     Attends [employer] Services:     Active Member of Clubs or Organizations:     Attends Club or Organization Meetings:     Marital Status:      Family History   Problem Relation Age of Onset    No Known Problems Father     Elevated Lipids Mother      Current Outpatient Medications   Medication Sig Dispense Refill    sertraline (ZOLOFT) 25 mg tablet Take 1 Tablet by mouth daily. Indications: anxiety (Patient taking differently: Take 50 mg by mouth daily. Indications: anxiety) 30 Tablet 1    ibuprofen (MOTRIN) 800 mg tablet Take 1 Tab by mouth every eight (8) hours. Indications: pain 60 Tab 2    prenatal 56/EMOC ps/folic/dha (PNV-FIRST PO) Take  by mouth.  (Patient not taking: Reported on 6/24/2021)       No Known Allergies    Review of Systems - General ROS: negative for - chills or fever  Cardiovascular ROS: no chest pain or dyspnea on exertion  Respiratory ROS: no cough, shortness of breath, or wheezing    Visit Vitals  /80 (BP 1 Location: Left upper arm, BP Patient Position: Sitting, BP Cuff Size: Adult)   Pulse 81   Temp 97.7 °F (36.5 °C) (Oral)   Resp 14   Ht 5' 9\" (1.753 m)   Wt 180 lb (81.6 kg)   SpO2 98%   BMI 26.58 kg/m²     Constitutional: [x] Appears well-developed and well-nourished [x] No apparent distress      [] Abnormal -     Mental status: [x] Alert and awake  [x] Oriented to person/place/time [x] Able to follow commands    [] Abnormal -     Eyes:   EOM    [x]  Normal    [] Abnormal -   Sclera  [x]  Normal    [] Abnormal -          Discharge [x]  None visible   [] Abnormal -     HENT: [x] Normocephalic, atraumatic  [] Abnormal -   [x] Mouth/Throat: Mucous membranes are moist    External Ears [x] Normal  [] Abnormal -    Neck: [x] No visualized mass [] Abnormal -     Pulmonary/Chest: [x] Respiratory effort normal   [x] No visualized signs of difficulty breathing or respiratory distress        [] Abnormal -      Musculoskeletal:   [x] Normal gait with no signs of ataxia         [x] Normal range of motion of neck        [] Abnormal -     Neurological:        [x] No Facial Asymmetry (Cranial nerve 7 motor function) (limited exam due to video visit)          [x] No gaze palsy        [] Abnormal -          Skin:        [x] No significant exanthematous lesions or discoloration noted on facial skin         [] Abnormal -            Psychiatric: [x] Normal Affect [] Abnormal -        [x] No Hallucinations      ATTENTION:   This medical record was transcribed using an electronic medical records/speech recognition system. Although proofread, it may and can contain electronic, spelling and other errors. Corrections may be executed at a later time. Please contact us for any clarifications as needed. On this date 06/24/21  I have spent 20 minutes reviewing previous notes, test results and face to face with the patient discussing the diagnosis and importance of compliance with the treatment plan as well as documenting on the day of the visit.

## 2021-06-25 LAB
ALBUMIN SERPL-MCNC: 4.9 G/DL (ref 3.5–5)
ALBUMIN/GLOB SERPL: 1.4 {RATIO} (ref 1.1–2.2)
ALP SERPL-CCNC: 83 U/L (ref 45–117)
ALT SERPL-CCNC: 25 U/L (ref 12–78)
ANION GAP SERPL CALC-SCNC: 7 MMOL/L (ref 5–15)
AST SERPL-CCNC: 15 U/L (ref 15–37)
BILIRUB SERPL-MCNC: 0.5 MG/DL (ref 0.2–1)
BUN SERPL-MCNC: 13 MG/DL (ref 6–20)
BUN/CREAT SERPL: 20 (ref 12–20)
CALCIUM SERPL-MCNC: 9.6 MG/DL (ref 8.5–10.1)
CHLORIDE SERPL-SCNC: 108 MMOL/L (ref 97–108)
CHOLEST SERPL-MCNC: 194 MG/DL
CO2 SERPL-SCNC: 24 MMOL/L (ref 21–32)
CREAT SERPL-MCNC: 0.65 MG/DL (ref 0.55–1.02)
ERYTHROCYTE [DISTWIDTH] IN BLOOD BY AUTOMATED COUNT: 13.7 % (ref 11.5–14.5)
FOLATE SERPL-MCNC: 20.6 NG/ML (ref 5–21)
GLOBULIN SER CALC-MCNC: 3.6 G/DL (ref 2–4)
GLUCOSE SERPL-MCNC: 84 MG/DL (ref 65–100)
HCT VFR BLD AUTO: 44.2 % (ref 35–47)
HDLC SERPL-MCNC: 63 MG/DL
HDLC SERPL: 3.1 {RATIO} (ref 0–5)
HGB BLD-MCNC: 14.4 G/DL (ref 11.5–16)
LDLC SERPL CALC-MCNC: 112.8 MG/DL (ref 0–100)
MCH RBC QN AUTO: 28.1 PG (ref 26–34)
MCHC RBC AUTO-ENTMCNC: 32.6 G/DL (ref 30–36.5)
MCV RBC AUTO: 86.3 FL (ref 80–99)
NRBC # BLD: 0 K/UL (ref 0–0.01)
NRBC BLD-RTO: 0 PER 100 WBC
PLATELET # BLD AUTO: 393 K/UL (ref 150–400)
PMV BLD AUTO: 10.4 FL (ref 8.9–12.9)
POTASSIUM SERPL-SCNC: 4.1 MMOL/L (ref 3.5–5.1)
PROT SERPL-MCNC: 8.5 G/DL (ref 6.4–8.2)
RBC # BLD AUTO: 5.12 M/UL (ref 3.8–5.2)
SODIUM SERPL-SCNC: 139 MMOL/L (ref 136–145)
TRIGL SERPL-MCNC: 91 MG/DL (ref ?–150)
TSH SERPL DL<=0.05 MIU/L-ACNC: 0.91 UIU/ML (ref 0.36–3.74)
VIT B12 SERPL-MCNC: 408 PG/ML (ref 193–986)
VLDLC SERPL CALC-MCNC: 18.2 MG/DL
WBC # BLD AUTO: 11.8 K/UL (ref 3.6–11)

## 2021-08-11 ENCOUNTER — OFFICE VISIT (OUTPATIENT)
Dept: INTERNAL MEDICINE CLINIC | Age: 36
End: 2021-08-11
Payer: COMMERCIAL

## 2021-08-11 VITALS
HEART RATE: 77 BPM | TEMPERATURE: 98.1 F | HEIGHT: 69 IN | OXYGEN SATURATION: 98 % | SYSTOLIC BLOOD PRESSURE: 115 MMHG | BODY MASS INDEX: 27.55 KG/M2 | DIASTOLIC BLOOD PRESSURE: 74 MMHG | RESPIRATION RATE: 14 BRPM | WEIGHT: 186 LBS

## 2021-08-11 DIAGNOSIS — R35.0 URINARY FREQUENCY: Primary | ICD-10-CM

## 2021-08-11 DIAGNOSIS — R10.9 RIGHT FLANK PAIN: ICD-10-CM

## 2021-08-11 LAB
BILIRUB UR QL STRIP: NEGATIVE
GLUCOSE UR-MCNC: NEGATIVE MG/DL
KETONES P FAST UR STRIP-MCNC: NEGATIVE MG/DL
PH UR STRIP: 5.5 [PH] (ref 4.6–8)
PROT UR QL STRIP: NEGATIVE
SP GR UR STRIP: 1.03 (ref 1–1.03)
UA UROBILINOGEN AMB POC: NORMAL (ref 0.2–1)
URINALYSIS CLARITY POC: CLEAR
URINALYSIS COLOR POC: YELLOW
URINE BLOOD POC: NORMAL
URINE LEUKOCYTES POC: NEGATIVE
URINE NITRITES POC: NEGATIVE

## 2021-08-11 PROCEDURE — 81003 URINALYSIS AUTO W/O SCOPE: CPT | Performed by: NURSE PRACTITIONER

## 2021-08-11 PROCEDURE — 99213 OFFICE O/P EST LOW 20 MIN: CPT | Performed by: NURSE PRACTITIONER

## 2021-08-11 RX ORDER — CEFUROXIME AXETIL 500 MG/1
500 TABLET ORAL 2 TIMES DAILY
Qty: 14 TABLET | Refills: 0 | Status: SHIPPED | OUTPATIENT
Start: 2021-08-11 | End: 2022-03-03 | Stop reason: ALTCHOICE

## 2021-08-11 RX ORDER — LIDOCAINE 50 MG/G
2 PATCH TOPICAL EVERY 24 HOURS
Qty: 30 EACH | Refills: 0 | Status: SHIPPED | OUTPATIENT
Start: 2021-08-11 | End: 2022-03-03 | Stop reason: ALTCHOICE

## 2021-08-11 NOTE — PATIENT INSTRUCTIONS
Flank Pain: Care Instructions  Your Care Instructions  Flank pain is pain on the side of the back just below the rib cage and above the waist. It can be on one or both sides. Flank pain has many possible causes, including a kidney stone, a urinary tract infection, or back strain. Flank pain may get better on its own. But don't ignore new symptoms, such as fever, nausea and vomiting, urination problems, pain that gets worse, and dizziness. These may be signs of a more serious problem. You may have to have tests or other treatment. Follow-up care is a key part of your treatment and safety. Be sure to make and go to all appointments, and call your doctor if you are having problems. It's also a good idea to know your test results and keep a list of the medicines you take. How can you care for yourself at home? · Rest until you feel better. · Take pain medicines exactly as directed. ? If the doctor gave you a prescription medicine for pain, take it as prescribed. ? If you are not taking a prescription pain medicine, ask your doctor if you can take an over-the-counter pain medicine, such as acetaminophen (Tylenol), ibuprofen (Advil, Motrin), or naproxen (Aleve). Read and follow all instructions on the label. · If your doctor prescribed antibiotics, take them as directed. Do not stop taking them just because you feel better. You need to take the full course of antibiotics. · To apply heat, put a warm water bottle, a heating pad set on low, or a warm cloth on the painful area. Do not go to sleep with a heating pad on your skin. · To prevent dehydration, drink plenty of fluids. Choose water and other caffeine-free clear liquids until you feel better. If you have kidney, heart, or liver disease and have to limit fluids, talk with your doctor before you increase the amount of fluids you drink. When should you call for help?    Call your doctor now or seek immediate medical care if:    · Your flank pain gets worse.     · You have new symptoms, such as fever, nausea, or vomiting.     · You have symptoms of a urinary problem. For example:  ? You have blood or pus in your urine. ? You have chills or body aches. ? It hurts to urinate. ? You have groin or belly pain. Watch closely for changes in your health, and be sure to contact your doctor if you do not get better as expected. Where can you learn more? Go to http://www.rodas.com/  Enter S191 in the search box to learn more about \"Flank Pain: Care Instructions. \"  Current as of: February 26, 2020               Content Version: 12.8  © 3572-2408 [employer]. Care instructions adapted under license by [employer] (which disclaims liability or warranty for this information). If you have questions about a medical condition or this instruction, always ask your healthcare professional. [address] any warranty or liability for your use of this information.

## 2021-08-11 NOTE — PROGRESS NOTES
Olga Lidia Alberto (: 1985) is a 30 y.o. female, established patient, here for evaluation of the following chief complaint(s):  Bladder Infection (started week ago - has lower right back pain and comes around to the abdomen)       ASSESSMENT/PLAN:  Below is the assessment and plan developed based on review of pertinent history, physical exam, labs, studies, and medications. 1. Urinary frequency -- urine dip in office positive for trace of blood but negative for nitrites, leukocytes. Will send out urine culture; given Rx for Ceftin to start if urine culture returns as positive. -     AMB POC URINALYSIS DIP STICK AUTO W/O MICRO  -     CULTURE, URINE; Future  -     cefUROXime (CEFTIN) 500 mg tablet; Take 1 Tablet by mouth two (2) times a day., Normal, Disp-14 Tablet, R-0    2. Right flank pain -- suspect MSK etiology; can use Lidoderm patch; if not covered by insurance, can use Salonpas patch. Tylenol as needed  -     CULTURE, URINE; Future  -     lidocaine (LIDODERM) 5 %; 2 Patches by TransDERmal route every twenty-four (24) hours. Apply patch to the affected area for 12 hours a day and remove for 12 hours a day., Print, Disp-30 Each, R-0    3. Patient is a currently breast-feeding mother        SUBJECTIVE/OBJECTIVE:  HPI    Presents with complaints of right flank discomfort, urinary frequency for the past week. Denies any known injury to back. Reports discomfort feels deep on inside and has been a constant dull ache that does not improve with rest.  Denies burning with voiding, fever, chills. She is currently nursing her 11 month old son; has not taken any OTC medications for current symptoms. Has not noted any blood in urine. Denies nausea, vomiting.     Patient Active Problem List   Diagnosis Code    Prenatal care of multigravida, antepartum Z34.80    Labor without complication N42     Past Surgical History:   Procedure Laterality Date    HX HEENT      wisdom teeth extraction    HX HEENT nasal fracture repair    HX OTHER SURGICAL  09/03/2019    vaginal delivery      Social History     Socioeconomic History    Marital status:      Spouse name: Not on file    Number of children: Not on file    Years of education: Not on file    Highest education level: Not on file   Occupational History    Not on file   Tobacco Use    Smoking status: Never Smoker    Smokeless tobacco: Never Used   Vaping Use    Vaping Use: Never used   Substance and Sexual Activity    Alcohol use: Not Currently     Comment: occasional    Drug use: No    Sexual activity: Yes     Partners: Male   Other Topics Concern     Service Not Asked    Blood Transfusions Not Asked    Caffeine Concern Not Asked    Occupational Exposure Not Asked    Hobby Hazards Not Asked    Sleep Concern Not Asked    Stress Concern Not Asked    Weight Concern Not Asked    Special Diet Not Asked    Back Care Not Asked    Exercise Not Asked    Bike Helmet Not Asked   [address] Not Asked    Self-Exams Not Asked   Social History Narrative    Full time  for private school    Manageable stress              healthy        3 children    4 months    25 son months    4.4 yo son         Social Determinants of Health     Financial Resource Strain:     Difficulty of Paying Living Expenses:    Food Insecurity:     Worried About [address] in the Last Year:     [address] in the Last Year:    Transportation Needs:     Lack of Transportation (Medical):      Lack of Transportation (Non-Medical):    Physical Activity:     Days of Exercise per Week:     Minutes of Exercise per Session:    Stress:     Feeling of Stress :    Social Connections:     Frequency of Communication with Friends and Family:     Frequency of Social Gatherings with Friends and Family:     Attends Anabaptism Services:     Active Member of Clubs or Organizations:     Attends Club or Organization Meetings:    Conce[employer]ion Manual Marital Status:    Intimate Partner Violence:     Fear of Current or Ex-Partner:     Emotionally Abused:     Physically Abused:     Sexually Abused:      Family History   Problem Relation Age of Onset    No Known Problems Father     Elevated Lipids Mother      Current Outpatient Medications   Medication Sig    cefUROXime (CEFTIN) 500 mg tablet Take 1 Tablet by mouth two (2) times a day.  lidocaine (LIDODERM) 5 % 2 Patches by TransDERmal route every twenty-four (24) hours. Apply patch to the affected area for 12 hours a day and remove for 12 hours a day.  sertraline (ZOLOFT) 50 mg tablet Take 1 Tablet by mouth daily. Indications: anxiety    ibuprofen (MOTRIN) 800 mg tablet Take 1 Tab by mouth every eight (8) hours. Indications: pain    prenatal 76/DQGP ps/folic/dha (PNV-FIRST PO) Take  by mouth. (Patient not taking: Reported on 6/24/2021)     No current facility-administered medications for this visit. No Known Allergies  Immunization History   Administered Date(s) Administered    Influenza Vaccine LinPrim) PF (>6 Mo Flulaval, Fluarix, and >3 Yrs Afluria, Fluzone 15791) 10/26/2017, 09/24/2019, 10/21/2020    Tdap 06/26/2016, 06/24/2019, 11/17/2020       Review of Systems   Constitutional: Negative for chills and fever. Respiratory: Negative for cough and shortness of breath. Cardiovascular: Negative for chest pain. Gastrointestinal: Negative for abdominal pain, nausea and vomiting. Genitourinary: Positive for flank pain and frequency. Negative for hematuria and pelvic pain. Musculoskeletal: Positive for back pain. Neurological: Negative for headaches. /74 (BP 1 Location: Left upper arm, BP Patient Position: Sitting, BP Cuff Size: Adult)   Pulse 77   Temp 98.1 °F (36.7 °C) (Temporal)   Resp 14   Ht 5' 9\" (1.753 m)   Wt 186 lb (84.4 kg)   SpO2 98%   Breastfeeding Yes   BMI 27.47 kg/m²   Physical Exam  Vitals and nursing note reviewed.    Constitutional: Appearance: Normal appearance. HENT:      Head: Normocephalic and atraumatic. Cardiovascular:      Rate and Rhythm: Normal rate and regular rhythm. Pulmonary:      Effort: Pulmonary effort is normal.      Breath sounds: Normal breath sounds. Abdominal:      General: Bowel sounds are normal.      Palpations: Abdomen is soft. Tenderness: There is no abdominal tenderness. There is right CVA tenderness. There is no guarding or rebound. Musculoskeletal:      Cervical back: Normal range of motion and neck supple. Skin:     General: Skin is warm and dry. Findings: No rash. Neurological:      General: No focal deficit present. Mental Status: She is alert and oriented to person, place, and time. Psychiatric:         Mood and Affect: Mood normal.         Behavior: Behavior normal.       Results for orders placed or performed in visit on 08/11/21   AMB POC URINALYSIS DIP STICK AUTO W/O MICRO     Status: None   Result Value Ref Range Status    Color (UA POC) Yellow  Final    Clarity (UA POC) Clear  Final    Glucose (UA POC) Negative Negative Final    Bilirubin (UA POC) Negative Negative Final    Ketones (UA POC) Negative Negative Final    Specific gravity (UA POC) 1.030 1.001 - 1.035 Final    Blood (UA POC) Trace Negative Final    pH (UA POC) 5.5 4.6 - 8.0 Final    Protein (UA POC) Negative Negative Final    Urobilinogen (UA POC) 0.2 mg/dL 0.2 - 1 Final    Nitrites (UA POC) Negative Negative Final    Leukocyte esterase (UA POC) Negative Negative Final               An electronic signature was used to authenticate this note.   -- Mohan Cheung NP

## 2021-08-13 LAB
BACTERIA SPEC CULT: NORMAL
SERVICE CMNT-IMP: NORMAL

## 2021-08-30 ENCOUNTER — OFFICE VISIT (OUTPATIENT)
Dept: URGENT CARE | Age: 36
End: 2021-08-30
Payer: COMMERCIAL

## 2021-08-30 VITALS — HEART RATE: 78 BPM | TEMPERATURE: 98 F | OXYGEN SATURATION: 97 % | RESPIRATION RATE: 18 BRPM

## 2021-08-30 DIAGNOSIS — Z20.822 EXPOSURE TO COVID-19 VIRUS: Primary | ICD-10-CM

## 2021-08-30 LAB — SARS-COV-2 POC: NEGATIVE

## 2021-08-30 PROCEDURE — S9083 URGENT CARE CENTER GLOBAL: HCPCS | Performed by: NURSE PRACTITIONER

## 2021-08-30 PROCEDURE — 87426 SARSCOV CORONAVIRUS AG IA: CPT | Performed by: NURSE PRACTITIONER

## 2021-08-30 NOTE — PROGRESS NOTES
Subjective: (As above and below)       This patient was seen in [facility] at [address] [place] [address] Urgent Care while in their vehicle due to COVID-19 pandemic with PPE and focused examination in order to decrease community viral transmission. The patient/guardian gave verbal consent to treat. Chief Complaint   Patient presents with    Concern For COVID-19 (Coronavirus)     Pt reports possible exposure to COVID 19, denies all symptoms at this time. Marisel Chacon is a 30 y.o. female who presents for COVID-19 Exposure and testing. Currently has not tried any therapies. Feels well and denies any symptoms at this point in time. Known Exposure to COVID-19: no      Review of Systems - negative except as listed above    Reviewed PmHx, RxHx, FmHx, SocHx, AllgHx and updated in chart.   Family History   Problem Relation Age of Onset    No Known Problems Father     Elevated Lipids Mother        Past Medical History:   Diagnosis Date    Abnormal Papanicolaou smear of cervix     colposcopy, dr. misty eckert    Arrhythmia     Pap smear for cervical cancer screening 05/03/2017 neg HPV POS    Vaginal delivery 02/05/2021      Social History     Socioeconomic History    Marital status:      Spouse name: Not on file    Number of children: Not on file    Years of education: Not on file    Highest education level: Not on file   Tobacco Use    Smoking status: Never Smoker    Smokeless tobacco: Never Used   Vaping Use    Vaping Use: Never used   Substance and Sexual Activity    Alcohol use: Not Currently     Comment: occasional    Drug use: No    Sexual activity: Yes     Partners: Male   Other Topics Concern   Social History Narrative    Full time  for private school    Manageable stress              healthy        3 children    4 months    25 son months    4.4 yo son         Social Determinants of Health     Financial Resource Strain:     Difficulty of Paying Living Expenses:    Food Insecurity:     Worried About Running Out of Food in the Last Year:     [address] in the Last Year:    Transportation Needs:     Lack of Transportation (Medical):  Lack of Transportation (Non-Medical):    Physical Activity:     Days of Exercise per Week:     Minutes of Exercise per Session:    Stress:     Feeling of Stress :    Social Connections:     Frequency of Communication with Friends and Family:     Frequency of Social Gatherings with Friends and Family:     Attends Confucianism Services:     Active Member of Clubs or Organizations:     Attends Club or Organization Meetings:     Marital Status:           Current Outpatient Medications   Medication Sig    cefUROXime (CEFTIN) 500 mg tablet Take 1 Tablet by mouth two (2) times a day.  lidocaine (LIDODERM) 5 % 2 Patches by TransDERmal route every twenty-four (24) hours. Apply patch to the affected area for 12 hours a day and remove for 12 hours a day.  sertraline (ZOLOFT) 50 mg tablet Take 1 Tablet by mouth daily. Indications: anxiety    prenatal 76/ITEK ps/folic/dha (PNV-FIRST PO) Take  by mouth. (Patient not taking: Reported on 6/24/2021)    ibuprofen (MOTRIN) 800 mg tablet Take 1 Tab by mouth every eight (8) hours. Indications: pain     No current facility-administered medications for this visit. Objective:     Vitals:    08/30/21 1039   Pulse: 78   Resp: 18   Temp: 98 °F (36.7 °C)   SpO2: 97%       Physical Exam  General appearance  appears well hydrated and does not appear toxic, no acute distress  Eyes - EOMs intact. Non injected. Ears - no external swelling  Neck/Lymphatics  no obvious swelling  Chest - normal breathing effort. No active cough heard. No audible wheezing. No wheeze rales or rhonchi bilat  Heart - HR-see vitals  Skin - no observable rashes or pallor  Neurologic- alert and oriented x 3  Psychiatric- normal mood, behavior and though content. Assessment/ Plan:     1. Exposure to COVID-19 virus    - AMB POC SARS-COV-2  - NOVEL CORONAVIRUS (COVID-19)      Will test for COVID-19 given exposure. Rapid COVID 19   Results for orders placed or performed in visit on 08/30/21   AMB POC SARS-COV-2   Result Value Ref Range    SARS-COV-2 POC Negative Negative       Supportive home care for any development of mild symptoms - tylenol, maintain adequate fluid intake, deep breathing exercises  Self isolate/quarantine advised based on recommendations in current CDC guidelines.            Demarcus Gillespie, NP

## 2021-09-02 LAB — SARS-COV-2, NAA: NOT DETECTED

## 2021-10-01 ENCOUNTER — OFFICE VISIT (OUTPATIENT)
Dept: INTERNAL MEDICINE CLINIC | Age: 36
End: 2021-10-01
Payer: COMMERCIAL

## 2021-10-01 VITALS
OXYGEN SATURATION: 97 % | BODY MASS INDEX: 27.25 KG/M2 | HEART RATE: 59 BPM | WEIGHT: 184 LBS | DIASTOLIC BLOOD PRESSURE: 66 MMHG | SYSTOLIC BLOOD PRESSURE: 116 MMHG | RESPIRATION RATE: 14 BRPM | HEIGHT: 69 IN | TEMPERATURE: 98 F

## 2021-10-01 DIAGNOSIS — F41.9 ANXIETY: ICD-10-CM

## 2021-10-01 PROCEDURE — 99213 OFFICE O/P EST LOW 20 MIN: CPT | Performed by: INTERNAL MEDICINE

## 2021-10-01 RX ORDER — SERTRALINE HYDROCHLORIDE 50 MG/1
50 TABLET, FILM COATED ORAL DAILY
Qty: 90 TABLET | Refills: 3 | Status: SHIPPED | OUTPATIENT
Start: 2021-10-01 | End: 2022-03-03 | Stop reason: ALTCHOICE

## 2021-10-01 NOTE — PROGRESS NOTES
Diagnoses and all orders for this visit:    1. Anxiety  Well controlled  PHQ 2 score is 0  BERNARD 2 score is 0  She has not had a panic attack since increase in Zoloft  Continue meds and follow-up in 1 year or earlier if needed. She is followed by gynecology as well. -     sertraline (ZOLOFT) 50 mg tablet; Take 1 Tablet by mouth daily. Indications: anxiety        Chief Complaint   Patient presents with    Follow-up     No concerns          Anxiety  Sleeping 5-8 hours not well rested, still breadfeeding. No panic attacks  Has reports that he feels she is doing better. She is active at home and engaged with children. exercisng exercise bike with [employer]  Energy level is much better. She increased recently to 50 mg and been doing well. She notes she still has some anxiety but it is overall well controlled. Her symptoms are significantly better than before. Her BERNARD 2 score is 1 and her PHQ 2 score is 0 consistent with good control on anxiety and depression scales. Energy level is appropriate given her 3month-old and breast-feeding as well as 2 young boys. She has not been compliant with her prenatal vitamins. Background  Patient reports symptoms are still present. She did not take sertraline yet. She has had  3-4 panic attacks in the last month. Her BERNARD 7 score is 18. Her  notes that she has significant anxiety as well. Patient reports that she had the Covid vaccine after delivering her baby and then developed symptoms. She notes she had anxiety about taking the Covid vaccine and after getting the vaccine her anxiety symptoms got significantly worse. She has fatigue when waking up. She is currently not breast-feeding. Labs reviewed with patient. She is concerned she may have a brain tumor.       Past Medical History:   Diagnosis Date    Abnormal Papanicolaou smear of cervix     colposcopy, dr. Victoriano Carlisle Pap smear for cervical cancer screening 05/03/2017 neg HPV POS  Vaginal delivery 02/05/2021     Past Surgical History:   Procedure Laterality Date    HX HEENT      wisdom teeth extraction    HX HEENT      nasal fracture repair    HX OTHER SURGICAL  09/03/2019    vaginal delivery      Social History     Socioeconomic History    Marital status:      Spouse name: Not on file    Number of children: Not on file    Years of education: Not on file    Highest education level: Not on file   Tobacco Use    Smoking status: Never Smoker    Smokeless tobacco: Never Used   Vaping Use    Vaping Use: Never used   Substance and Sexual Activity    Alcohol use: Not Currently     Comment: occasional    Drug use: No    Sexual activity: Yes     Partners: Male   Other Topics Concern   Social History Narrative    Full time  for [employer]    Manageable stress              healthy        3 children    4 months    25 son months    4.4 yo son         Social Determinants of Health     Financial Resource Strain:     Difficulty of Paying Living Expenses:    Food Insecurity:     Worried About [address] in the Last Year:     [address] in the Last Year:    Transportation Needs:     Lack of Transportation (Medical):  Lack of Transportation (Non-Medical):    Physical Activity:     Days of Exercise per Week:     Minutes of Exercise per Session:    Stress:     Feeling of Stress :    Social Connections:     Frequency of Communication with Friends and Family:     Frequency of Social Gatherings with Friends and Family:     Attends Hindu Services:     Active Member of Clubs or Organizations:     Attends Club or Organization Meetings:     Marital Status:      Family History   Problem Relation Age of Onset    No Known Problems Father     Elevated Lipids Mother      Current Outpatient Medications   Medication Sig Dispense Refill    sertraline (ZOLOFT) 50 mg tablet Take 1 Tablet by mouth daily.  Indications: anxiety 90 Tablet 0    ibuprofen (MOTRIN) 800 mg tablet Take 1 Tab by mouth every eight (8) hours. Indications: pain 60 Tab 2    cefUROXime (CEFTIN) 500 mg tablet Take 1 Tablet by mouth two (2) times a day. (Patient not taking: Reported on 10/1/2021) 14 Tablet 0    lidocaine (LIDODERM) 5 % 2 Patches by TransDERmal route every twenty-four (24) hours. Apply patch to the affected area for 12 hours a day and remove for 12 hours a day. (Patient not taking: Reported on 10/1/2021) 30 Each 0    prenatal 14/LFUS ps/folic/dha (PNV-FIRST PO) Take  by mouth.  (Patient not taking: Reported on 6/24/2021)       No Known Allergies    Review of Systems - General ROS: negative for - chills or fever  Cardiovascular ROS: no chest pain or dyspnea on exertion  Respiratory ROS: no cough, shortness of breath, or wheezing    Visit Vitals  /66 (BP 1 Location: Right upper arm, BP Patient Position: Sitting, BP Cuff Size: Adult)   Pulse (!) 59   Temp 98 °F (36.7 °C) (Oral)   Resp 14   Ht 5' 9\" (1.753 m)   Wt 184 lb (83.5 kg)   SpO2 97%   Breastfeeding Yes   BMI 27.17 kg/m²     Constitutional: [x] Appears well-developed and well-nourished [x] No apparent distress      [] Abnormal -     Mental status: [x] Alert and awake  [x] Oriented to person/place/time [x] Able to follow commands    [] Abnormal -     Eyes:   EOM    [x]  Normal    [] Abnormal -   Sclera  [x]  Normal    [] Abnormal -          Discharge [x]  None visible   [] Abnormal -     HENT: [x] Normocephalic, atraumatic  [] Abnormal -   [x] Mouth/Throat: Mucous membranes are moist    External Ears [x] Normal  [] Abnormal -    Neck: [x] No visualized mass [] Abnormal -     Pulmonary/Chest: [x] Respiratory effort normal   [x] No visualized signs of difficulty breathing or respiratory distress        [] Abnormal -      Musculoskeletal:   [x] Normal gait with no signs of ataxia         [x] Normal range of motion of neck        [] Abnormal -     Neurological:        [x] No Facial Asymmetry (Cranial nerve 7 motor function) (limited exam due to video visit)          [x] No gaze palsy        [] Abnormal -          Skin:        [x] No significant exanthematous lesions or discoloration noted on facial skin         [] Abnormal -            Psychiatric:       [x] Normal Affect [] Abnormal -        [x] No Hallucinations      ATTENTION:   This medical record was transcribed using an electronic medical records/speech recognition system. Although proofread, it may and can contain electronic, spelling and other errors. Corrections may be executed at a later time. Please contact us for any clarifications as needed.

## 2021-10-10 ENCOUNTER — TELEPHONE (OUTPATIENT)
Dept: OBGYN CLINIC | Age: 36
End: 2021-10-10

## 2021-10-10 RX ORDER — DICLOXACILLIN SODIUM 250 MG/1
500 CAPSULE ORAL
Qty: 56 CAPSULE | Refills: 0 | Status: SHIPPED | OUTPATIENT
Start: 2021-10-10 | End: 2021-10-17

## 2021-10-11 ENCOUNTER — OFFICE VISIT (OUTPATIENT)
Dept: URGENT CARE | Age: 36
End: 2021-10-11
Payer: COMMERCIAL

## 2021-10-11 VITALS — RESPIRATION RATE: 14 BRPM | OXYGEN SATURATION: 97 % | HEART RATE: 80 BPM | TEMPERATURE: 98.5 F

## 2021-10-11 DIAGNOSIS — R50.9 FEVER, UNSPECIFIED FEVER CAUSE: Primary | ICD-10-CM

## 2021-10-11 DIAGNOSIS — R52 BODY ACHES: ICD-10-CM

## 2021-10-11 LAB — SARS-COV-2 POC: NEGATIVE

## 2021-10-11 PROCEDURE — 87426 SARSCOV CORONAVIRUS AG IA: CPT | Performed by: FAMILY MEDICINE

## 2021-10-11 PROCEDURE — 99213 OFFICE O/P EST LOW 20 MIN: CPT | Performed by: FAMILY MEDICINE

## 2021-10-11 NOTE — PROGRESS NOTES
Subjective: (As above and below)       This patient was seen in Flu Clinic at [address] [place] [address] Urgent Care while outdoors at their vehicle due to COVID-19 pandemic with PPE and focused examination in order to decrease community viral transmission. The patient/guardian gave verbal consent to treat. Chief Complaint   Patient presents with    Fever     chills and body ache, started yesterday     Rhina Nguyen is a 30 y.o. female who presents for covid 19 testin. Promtoes her infant has fever and rash and has current pending covid 19 test from pediatrician office. Was negative for flu. Patient reports experiencing fever, body aches and chills. Fever onset last night 102 F. No nasal congestion, cough or runny nose. Is on abx for mastitis managed by her OBGYN. States it is imporoving and denies concern with this worsening. Denies n/v        Review of Systems - negative except as listed above    Reviewed PmHx, RxHx, FmHx, SocHx, AllgHx and updated in chart.   Family History   Problem Relation Age of Onset    No Known Problems Father     Elevated Lipids Mother        Past Medical History:   Diagnosis Date    Abnormal Papanicolaou smear of cervix     colposcopy, dr. misty eckert    Arrhythmia     Pap smear for cervical cancer screening 05/03/2017 neg HPV POS    Vaginal delivery 02/05/2021      Social History     Socioeconomic History    Marital status:      Spouse name: Not on file    Number of children: Not on file    Years of education: Not on file    Highest education level: Not on file   Tobacco Use    Smoking status: Never Smoker    Smokeless tobacco: Never Used   Vaping Use    Vaping Use: Never used   Substance and Sexual Activity    Alcohol use: Not Currently     Comment: occasional    Drug use: No    Sexual activity: Yes     Partners: Male   Other Topics Concern   Social History Narrative    Full time  for private school    Manageable stress   healthy        3 children    4 months    25 son months    4.6 yo son         Social Determinants of Health     Financial Resource Strain:     Difficulty of Paying Living Expenses:    Food Insecurity:     Worried About Running Out of Food in the Last Year:     [address] in the Last Year:    Transportation Needs:     Lack of Transportation (Medical):  Lack of Transportation (Non-Medical):    Physical Activity:     Days of Exercise per Week:     Minutes of Exercise per Session:    Stress:     Feeling of Stress :    Social Connections:     Frequency of Communication with Friends and Family:     Frequency of Social Gatherings with Friends and Family:     Attends Roman Catholic Services:     Active Member of Clubs or Organizations:     Attends Club or Organization Meetings:     Marital Status:           Current Outpatient Medications   Medication Sig    dicloxacillin (DYNAPEN) 250 mg capsule Take 2 Capsules by mouth Before breakfast, lunch, dinner and at bedtime for 7 days.  sertraline (ZOLOFT) 50 mg tablet Take 1 Tablet by mouth daily. Indications: anxiety    cefUROXime (CEFTIN) 500 mg tablet Take 1 Tablet by mouth two (2) times a day. (Patient not taking: Reported on 10/1/2021)    lidocaine (LIDODERM) 5 % 2 Patches by TransDERmal route every twenty-four (24) hours. Apply patch to the affected area for 12 hours a day and remove for 12 hours a day. (Patient not taking: Reported on 10/1/2021)    prenatal 72/TMUW ps/folic/dha (PNV-FIRST PO) Take  by mouth. (Patient not taking: Reported on 6/24/2021)    ibuprofen (MOTRIN) 800 mg tablet Take 1 Tab by mouth every eight (8) hours. Indications: pain     No current facility-administered medications for this visit.        Objective:     Vitals:    10/11/21 1609   Pulse: 80   Resp: 14   Temp: 98.5 °F (36.9 °C)   SpO2: 97%       Physical Exam  General appearance  appears well hydrated and does not appear toxic, no acute distress  Eyes - EOMs intact. Non injected. No scleral icterus   Ears - no external swelling  Nose -  No purulent drainage  Mouth - OP clear without swelling, exudate or lesion. Mucus membranes moist. Uvula midline. Neck/Lymphatics  trachea midline, full AROM  Chest - Normal breathing effort no wheeze rales, rhonchi or diminishments bilaterally. Heart - RRR, no murmurs  Skin - no observable rashes or pallor  Neurologic- alert and oriented x 3  Psychiatric- normal mood, behavior and though content. Assessment/ Plan:     1. Fever, unspecified fever cause    - NOVEL CORONAVIRUS (COVID-19); Future  - AMB POC SARS-COV-2  - NOVEL CORONAVIRUS (COVID-19)    2. Body aches    - NOVEL CORONAVIRUS (COVID-19); Future  - AMB POC SARS-COV-2  - NOVEL CORONAVIRUS (COVID-19)    Rapid covid 19 NEGATIVE. PCR test results pending. Will call patient for any positive results. Will test for covid 19 given exposure to sick child who also has fever. Consider mastitis; should cotact OBGYN for any lingering, worsening or new symptoms related to recent mastitis  VS otherwise stable today and no evidence suggesting complication of illness at this time. Will discharge home with close monitoring and follow up. Supportive home care for mild symptoms advised- maintain adequate fluid intake, over the counter Tylenol (for fever, aches, pains, chills)        Test Results:  Recent Results (from the past 6 hour(s))   AMB POC SARS-COV-2    Collection Time: 10/11/21  4:44 PM   Result Value Ref Range    SARS-COV-2 POC Negative Negative       Follow up:   We have reviewed worsening/concerning signs and symptoms that may warrant seeking immediate care in         Elsy Sam NP

## 2021-10-13 LAB
SARS-COV-2, NAA 2 DAY TAT: NORMAL
SARS-COV-2, NAA: NOT DETECTED

## 2022-03-03 ENCOUNTER — OFFICE VISIT (OUTPATIENT)
Dept: OBGYN CLINIC | Age: 37
End: 2022-03-03
Payer: COMMERCIAL

## 2022-03-03 VITALS — WEIGHT: 178 LBS | BODY MASS INDEX: 26.29 KG/M2 | SYSTOLIC BLOOD PRESSURE: 150 MMHG | DIASTOLIC BLOOD PRESSURE: 80 MMHG

## 2022-03-03 DIAGNOSIS — Z01.419 WELL WOMAN EXAM: Primary | ICD-10-CM

## 2022-03-03 PROBLEM — Z34.80 PRENATAL CARE OF MULTIGRAVIDA, ANTEPARTUM: Status: RESOLVED | Noted: 2020-07-08 | Resolved: 2022-03-03

## 2022-03-03 PROCEDURE — 99395 PREV VISIT EST AGE 18-39: CPT | Performed by: OBSTETRICS & GYNECOLOGY

## 2022-03-03 NOTE — PROGRESS NOTES
Annual exam ages 22-44    Carole Davidson is a ,  29 y.o. female WHITE/NON- No LMP recorded. (Menstrual status: IUD). .    She presents for her annual checkup. She is having no significant problems. She is having discomfort with urination. Urine in office dip negative. Mirena IUD inserted 2021. With regard to the Gardasil vaccine, she is older than the FDA approved age to receive it. Menstrual status:    Her periods are absent in flow. She is using essentially none pads or tampons. She denies dysmenorrhea. She reports no premenstrual symptoms. Contraception:    The current method of family planning is IUD. Sexual history:     She  reports being sexually active and has had partner(s) who are male. She reports using the following method of birth control/protection: I.U.D.. Irasema Major Medical conditions:    Since her last annual GYN exam about one year ago, she has not the following changes in her health history: none. Pap and Mammogram History:    Her most recent Pap smear was normal, obtained 2 year(s) ago. Breast Cancer History/Substance Abuse: negative    Past Medical History:   Diagnosis Date    Abnormal Papanicolaou smear of cervix     colposcopy, dr. Renae Gomes Pap smear for cervical cancer screening 2017 neg HPV POS    Vaginal delivery 2021     Past Surgical History:   Procedure Laterality Date    HX HEENT      wisdom teeth extraction    HX HEENT      nasal fracture repair    HX OTHER SURGICAL  2019    vaginal delivery        Current Outpatient Medications   Medication Sig Dispense Refill    sertraline (ZOLOFT) 50 mg tablet Take 1 Tablet by mouth daily. Indications: anxiety 90 Tablet 3    cefUROXime (CEFTIN) 500 mg tablet Take 1 Tablet by mouth two (2) times a day. (Patient not taking: Reported on 10/1/2021) 14 Tablet 0    lidocaine (LIDODERM) 5 % 2 Patches by TransDERmal route every twenty-four (24) hours.  Apply patch to the affected area for 12 hours a day and remove for 12 hours a day. (Patient not taking: Reported on 10/1/2021) 30 Each 0    prenatal 52/QWVO ps/folic/dha (PNV-FIRST PO) Take  by mouth. (Patient not taking: Reported on 6/24/2021)      ibuprofen (MOTRIN) 800 mg tablet Take 1 Tab by mouth every eight (8) hours. Indications: pain 60 Tab 2     Allergies: Patient has no known allergies. Tobacco History:  reports that she has never smoked. She has never used smokeless tobacco.  Alcohol Abuse:  reports previous alcohol use. Drug Abuse:  reports no history of drug use.     Family Medical/Cancer History:   Family History   Problem Relation Age of Onset    No Known Problems Father     Elevated Lipids Mother         Review of Systems - History obtained from the patient  Constitutional: negative for weight loss, fever, night sweats  HEENT: negative for hearing loss, earache, congestion, snoring, sorethroat  CV: negative for chest pain, palpitations, edema  Resp: negative for cough, shortness of breath, wheezing  GI: negative for change in bowel habits, abdominal pain, black or bloody stools  : negative for frequency, dysuria, hematuria, vaginal discharge  MSK: negative for back pain, joint pain, muscle pain  Breast: negative for breast lumps, nipple discharge, galactorrhea  Skin :negative for itching, rash, hives  Neuro: negative for dizziness, headache, confusion, weakness  Psych: negative for anxiety, depression, change in mood  Heme/lymph: negative for bleeding, bruising, pallor    Physical Exam    Visit Vitals  BP (!) 150/80   Wt 178 lb (80.7 kg)   BMI 26.29 kg/m²       Constitutional  · Appearance: well-nourished, well developed, alert, in no acute distress    HENT  · Head and Face: appears normal    Chest  · Respiratory Effort: breathing unlabored    Breasts  · Inspection of Breasts: breasts symmetrical, no skin changes, no discharge present, nipple appearance normal, no skin retraction present  · Palpation of Breasts and Axillae: no masses present on palpation, no breast tenderness  · Axillary Lymph Nodes: no lymphadenopathy present    Gastrointestinal  · Abdominal Examination: abdomen non-tender to palpation, normal bowel sounds, no masses present  · Liver and spleen: no hepatomegaly present, spleen not palpable  · Hernias: no hernias identified    Genitourinary  · External Genitalia: normal appearance for age, no discharge present, no tenderness present, no inflammatory lesions present, no masses present, no atrophy present  · Vagina: normal vaginal vault without central or paravaginal defects, no discharge present, no inflammatory lesions present, no masses present  · Bladder: non-tender to palpation  · Urethra: appears normal  · Cervix: normal IUD string visualized  · Uterus: normal size, shape and consistency  · Adnexa: no adnexal tenderness present, no adnexal masses present  · Perineum: perineum within normal limits, no evidence of trauma, no rashes or skin lesions present  · Anus: anus within normal limits, no hemorrhoids present  · Inguinal Lymph Nodes: no lymphadenopathy present    Skin  · General Inspection: no rash, no lesions identified    Neurologic/Psychiatric  · Mental Status:  · Orientation: grossly oriented to person, place and time  · Mood and Affect: mood normal, affect appropriate    . Assessment:  Routine gynecologic examination; IUD in place  Her current medical status is satisfactory with no evidence of significant gynecologic issues. Plan:  GC/ chlamydia offered and declined   Patient encouraged to increase po intake and discussed bladder irritants. Consider cranberry tablets. Update office if symptoms worsen. Patient declines presence of chaperone during today's visit.    Pap done today  Counseled re: diet, exercise, healthy lifestyle  Return for yearly wellness visits  Gardisil counseling provided  Rec screening mammo at either 36 or 41

## 2022-03-03 NOTE — PATIENT INSTRUCTIONS
Well Visit, Ages 25 to 48: Care Instructions  Overview     Well visits can help you stay healthy. Your doctor has checked your overall health and may have suggested ways to take good care of yourself. Your doctor also may have recommended tests. At home, you can help prevent illness with healthy eating, regular exercise, and other steps. Follow-up care is a key part of your treatment and safety. Be sure to make and go to all appointments, and call your doctor if you are having problems. It's also a good idea to know your test results and keep a list of the medicines you take. How can you care for yourself at home? · Get screening tests that you and your doctor decide on. Screening helps find diseases before any symptoms appear. · Eat healthy foods. Choose fruits, vegetables, whole grains, protein, and low-fat dairy foods. Limit fat, especially saturated fat. Reduce salt in your diet. · Limit alcohol. If you are a man, have no more than 2 drinks a day or 14 drinks a week. If you are a woman, have no more than 1 drink a day or 7 drinks a week. · Get at least 30 minutes of physical activity on most days of the week. Walking is a good choice. You also may want to do other activities, such as running, swimming, cycling, or playing tennis or team sports. Discuss any changes in your exercise program with your doctor. · Reach and stay at a healthy weight. This will lower your risk for many problems, such as obesity, diabetes, heart disease, and high blood pressure. · Do not smoke or allow others to smoke around you. If you need help quitting, talk to your doctor about stop-smoking programs and medicines. These can increase your chances of quitting for good. · Care for your mental health. It is easy to get weighed down by worry and stress. Learn strategies to manage stress, like deep breathing and mindfulness, and stay connected with your family and community.  If you find you often feel sad or hopeless, talk with your doctor. Treatment can help. · Talk to your doctor about whether you have any risk factors for sexually transmitted infections (STIs). You can help prevent STIs if you wait to have sex with a new partner (or partners) until you've each been tested for STIs. It also helps if you use condoms (male or female condoms) and if you limit your sex partners to one person who only has sex with you. Vaccines are available for some STIs, such as HPV. · Use birth control if it's important to you to prevent pregnancy. Talk with your doctor about the choices available and what might be best for you. · If you think you may have a problem with alcohol or drug use, talk to your doctor. This includes prescription medicines (such as amphetamines and opioids) and illegal drugs (such as cocaine and methamphetamine). Your doctor can help you figure out what type of treatment is best for you. · Protect your skin from too much sun. When you're outdoors from 10 a.m. to 4 p.m., stay in the shade or cover up with clothing and a hat with a wide brim. Wear sunglasses that block UV rays. Even when it's cloudy, put broad-spectrum sunscreen (SPF 30 or higher) on any exposed skin. · See a dentist one or two times a year for checkups and to have your teeth cleaned. · Wear a seat belt in the car. When should you call for help? Watch closely for changes in your health, and be sure to contact your doctor if you have any problems or symptoms that concern you. Where can you learn more? Go to http://www.[employer].com/  Enter P072 in the search box to learn more about \"Well Visit, Ages 25 to 48: Care Instructions. \"  Current as of: February 11, 2021               Content Version: 13.0  © 2923-0802 [employer], [employer]orporated. Care instructions adapted under license by [employer] (which disclaims liability or warranty for this information).  If you have questions about a medical condition or this instruction, always ask your healthcare professional. [place] [address] any warranty or liability for your use of this information.

## 2022-05-12 ENCOUNTER — TELEPHONE (OUTPATIENT)
Dept: INTERNAL MEDICINE CLINIC | Age: 37
End: 2022-05-12

## 2022-05-12 NOTE — TELEPHONE ENCOUNTER
----- Message from [facility] AT [facility] sent at 5/11/2022  3:01 PM EDT -----  Subject: Message to Provider    QUESTIONS  Information for Provider? Urgent-pt needs a virtual appt due to sinus   infection currently . also needs prescription refilled too   ---------------------------------------------------------------------------  --------------  CALL BACK INFO  What is the best way for the office to contact you? OK to leave message on   voicemail  Preferred Call Back Phone Number? [phone]  ---------------------------------------------------------------------------  --------------  SCRIPT ANSWERS  Relationship to Patient?  Self

## 2022-05-12 NOTE — TELEPHONE ENCOUNTER
I called pt back, pt states she was seen yesterday by her insurance company and a script was sent in.

## 2022-08-03 ENCOUNTER — OFFICE VISIT (OUTPATIENT)
Dept: INTERNAL MEDICINE CLINIC | Age: 37
End: 2022-08-03
Payer: COMMERCIAL

## 2022-08-03 VITALS
DIASTOLIC BLOOD PRESSURE: 82 MMHG | WEIGHT: 176 LBS | BODY MASS INDEX: 26.07 KG/M2 | TEMPERATURE: 97.5 F | HEART RATE: 77 BPM | OXYGEN SATURATION: 98 % | SYSTOLIC BLOOD PRESSURE: 131 MMHG | RESPIRATION RATE: 16 BRPM | HEIGHT: 69 IN

## 2022-08-03 DIAGNOSIS — M54.6 CHRONIC RIGHT-SIDED THORACIC BACK PAIN: Primary | ICD-10-CM

## 2022-08-03 DIAGNOSIS — G89.29 CHRONIC RIGHT-SIDED THORACIC BACK PAIN: Primary | ICD-10-CM

## 2022-08-03 DIAGNOSIS — L74.3 MILIARIA: ICD-10-CM

## 2022-08-03 PROCEDURE — 99213 OFFICE O/P EST LOW 20 MIN: CPT | Performed by: INTERNAL MEDICINE

## 2022-08-03 NOTE — PROGRESS NOTES
Diagnoses and all orders for this visit:    1. Chronic right-sided thoracic back pain  Paraspinal muscle strain on right thorax  Intermittent symptoms  Appears to have some hip dysfunction and overuse of right hip and thorax-     REFERRAL TO PHYSICAL THERAPY    2. Miliaria  Triamcinolone twice daily/she has some at home  If not working I can send in clindamycin topically, Cleocin      Chief Complaint   Patient presents with    Back Pain     Paraspinal back pain  Patient reports that she has been having some intermittent back pain for the past 1 to 2 years. She notes she had it during her pregnancy as well. She had a flare when she was kayaking. She had a negative work-up with Tae Santiago. She notes that the pain is always on her right side. 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 She denies pain in her abdomen and denies pain in her glutes or down her legs. She denies numbness and tingling and no bowel or bladder incontinence      Heat rash  Plays tennis and has a severe itching at the nape of her neck. The itching becomes severe and she spends much of her time scratching the back of her neck to alleviate. She has been using hydrocortisone but no significant relief.     Past Medical History:   Diagnosis Date    Abnormal Papanicolaou smear of cervix     colposcopy, dr. misty eckert    Arrhythmia     Pap smear for cervical cancer screening 05/03/2017 neg HPV POS    Vaginal delivery 02/05/2021     Past Surgical History:   Procedure Laterality Date    HX HEENT      wisdom teeth extraction    HX HEENT      nasal fracture repair    HX OTHER SURGICAL  09/03/2019    vaginal delivery      Social History     Socioeconomic History    Marital status:    Tobacco Use    Smoking status: Never    Smokeless tobacco: Never   Vaping Use    Vaping Use: Never used   Substance and Sexual Activity    Alcohol use: Not Currently     Comment: occasional    Drug use: No    Sexual activity: Yes     Partners: Male     Birth control/protection: I.U.D.    Social History Narrative    Full time  for private school    Manageable stress              healthy        3 children    1 months    25 son months    4.6 yo son         Family History   Problem Relation Age of Onset    No Known Problems Father     Elevated Lipids Mother        No Known Allergies    Review of Systems - General ROS: negative for - chills or fever  Cardiovascular ROS: no chest pain or dyspnea on exertion  Respiratory ROS: no cough, shortness of breath, or wheezing    Visit Vitals  /82 (BP 1 Location: Left upper arm, BP Patient Position: Sitting, BP Cuff Size: Small adult)   Pulse 77   Temp 97.5 °F (36.4 °C) (Oral)   Resp 16   Ht 5' 9\" (1.753 m)   Wt 176 lb (79.8 kg)   SpO2 98%   BMI 25.99 kg/m²     Constitutional: [x] Appears well-developed and well-nourished [x] No apparent distress      [] Abnormal -     Mental status: [x] Alert and awake  [x] Oriented to person/place/time [x] Able to follow commands    [] Abnormal -     Eyes:   EOM    [x]  Normal    [] Abnormal -   Sclera  [x]  Normal    [] Abnormal -          Discharge [x]  None visible   [] Abnormal -     HENT: [x] Normocephalic, atraumatic  [] Abnormal -   [x] Mouth/Throat: Mucous membranes are moist    External Ears [x] Normal  [] Abnormal -    Neck: [x] No visualized mass [] Abnormal -     Pulmonary/Chest: [x] Respiratory effort normal   [x] No visualized signs of difficulty breathing or respiratory distress        [] Abnormal -      Musculoskeletal:   [x] Normal gait with no signs of ataxia         [x] Normal range of motion of neck        [] Abnormal -     Neurological:        [x] No Facial Asymmetry (Cranial nerve 7 motor function) (limited exam due to video visit)          [x] No gaze palsy        [] Abnormal -          Skin:        [x] No significant exanthematous lesions or discoloration noted on facial skin         [] Abnormal -            Psychiatric:       [x] Normal Affect [] Abnormal -        [x] No Hallucinations      ATTENTION:   This medical record was transcribed using an electronic medical records/speech recognition system. Although proofread, it may and can contain electronic, spelling and other errors. Corrections may be executed at a later time. Please contact us for any clarifications as needed. On this date 08/03/22  I have spent 20  minutes reviewing previous notes, test results and face to face with the patient discussing the diagnosis and importance of compliance with the treatment plan as well as documenting on the day of the visit.

## 2022-09-20 ENCOUNTER — HOSPITAL ENCOUNTER (OUTPATIENT)
Dept: PHYSICAL THERAPY | Age: 37
Discharge: HOME | End: 2022-09-20
Payer: COMMERCIAL

## 2022-09-20 PROCEDURE — 97162 PT EVAL MOD COMPLEX 30 MIN: CPT | Performed by: PHYSICAL THERAPIST

## 2022-09-20 PROCEDURE — 97112 NEUROMUSCULAR REEDUCATION: CPT | Performed by: PHYSICAL THERAPIST

## 2022-09-20 NOTE — PROGRESS NOTES
PT INITIAL EVALUATION NOTE - [facility] 2-15    Patient Name: Herve Topete  Date:2022  : 1985  [x]  Patient  Verified  Payor: Vaishali Peres / Plan: Lukas December / Product Type: HMO /    In time: 3:15 PM  Out time: 4:10 PM  Total Treatment Time (min): 55  Total Timed Codes (min): 25  1:1 Treatment Time ( [address] only): 25   Visit #: 1     Treatment Area: Chronic right-sided thoracic back pain [M54.6, G89.29 (ICD-10-CM)]    SUBJECTIVE  Pain Level (0-10 scale): 0/10  Any medication changes, allergies to medications, adverse drug reactions, diagnosis change, or new procedure performed?: [] No    [x] Yes (see summary sheet for update)  Subjective:    R back pain  PLOF: No limitations wtih bending forward, lifting her children  Mechanism of Injury: Right-sided thoracic back pain began in  and she believes it is from carrying her children. The pain was along the flank region and caused difficulty with breathing. It has gradually improved and today causes no pain. She still wants to be evaluated to see if there is something she can do to prevent it in the future. She also has had episodic low back pain since adolescence. Previous Treatment/Compliance: She was evaluated by her PCP, Dr. Bong Bonilla, and then referred to PT.   PMHx/Surgical Hx: No other significant PMH  Work Hx: Desk job  Living Situation: lives in a two story home with family  Pt Goals: to reduce pain and be able to  kids without pain  Barriers: chronicity  Motivation: motivated  Substance use: alcohol  Cognition: A & O x 4        OBJECTIVE/EXAMINATION  Myokinematic Restoration           R   L  Add Drop Test     +   ++  Extension Drop Test    -   -  Trunk Rotation     WNL   WNL  Straight Leg Raise    80   90  FA IR ROM     30   30  FA ER ROM     40   40  FA ER Strength  FA IR Strength  HAddLT     3/5   2/5  Standing Reach Test    0\"    Pelvis Restoration: NT    Postural Respiration    Apical Expansion    Limited   Limited  Horizontal Abduction    WNL   30  Shoulder Flexion    WNL   WNL  HG IR      45   60  Lower Rib Flare    +   +      Modality rationale: Patient declined   Min Type Additional Details    [] Estim: []Att   []Unatt        []TENS instruct                  []IFC  []Premod   []NMES                     []Other:  []w/US   []w/ice   []w/heat  Position:  Location:    []  Traction: [] Cervical       []Lumbar                       [] Prone          []Supine                       []Intermittent   []Continuous Lbs:  [] before manual  [] after manual  []w/heat    []  Ultrasound: []Continuous   [] Pulsed at:                           []1MHz   []3MHz Location:  W/cm2:    [] Paraffin         Location:   []w/heat    []  Ice     []  Heat  []  Ice massage Position:  Location:    []  Laser  []  Other: Position:  Location:      []  Vasopneumatic Device Pressure:       [] lo [] med [] hi   Temperature:      [x] Skin assessment post-treatment:  [x]intact []redness- no adverse reaction    []redness - adverse reaction:      25 min Neuromuscular Re-education:  [x]  See flow sheet :   Rationale: increase ROM, increase strength, and improve coordination  to improve the patients ability to bend forward without pain      With   [] TE   [] TA   [] Neuro   [] SC   [] other: Patient Education: [x] Review HEP    [] Progressed/Changed HEP based on:   [] positioning   [] body mechanics   [] transfers   [] heat/ice application    [] other:      Other Objective/Functional Measures: FOTO Functional Measure:     Pain Level (0-10 scale) post treatment: 0    ASSESSMENT/Changes in Function:     [x]  See Plan of Remi Owen, PT 9/20/2022

## 2022-09-20 NOTE — THERAPY EVALUATION
Physical Therapy at [facility],   a part of  [facility]  [address] [facility] Ricardo Irizarry  Phone: [phone]  Fax: [phone]    Plan of Care/ Statement of Necessity for Physical Therapy Services 2-15    Patient name: Vika Coronel  : 1985  Provider#: [id number]  Referral source: Harmeet Estrada *      Medical/Treatment Diagnosis: Other low back pain [M54.59]     Prior Hospitalization: see medical history     Comorbidities: none  Prior Level of Function: see initial eval  Medications: Verified on Patient Summary List    Start of Care: 23      Onset Date: 2022       The Plan of Care and following information is based on the information from the initial evaluation. Assessment/ key information: The patient presents with right-sided thoracic and chronic episodic midline low back pain. Today, her pain had mostly resolved from last month, however she continues to demonstrate a R BC/L AIC postural dysfunction that could be contributing to the chronicity of her symptoms. She would benefit from a [facility] program focused on neuromuscular re-education and lumbopelvic stability to prevent future episodes of back pain. Evaluation Complexity History MEDIUM  Complexity : 1-2 comorbidities / personal factors will impact the outcome/ POC ; Examination MEDIUM Complexity : 3 Standardized tests and measures addressing body structure, function, activity limitation and / or participation in recreation  ;Presentation MEDIUM Complexity : Evolving with changing characteristics  ; Clinical Decision Making MEDIUM Complexity : FOTO score of 26-74  Overall Complexity Rating: MEDIUM    Problem List: pain affecting function, decrease ROM, decrease strength, decrease ADL/ functional abilitiies, decrease activity tolerance, decrease flexibility/ joint mobility, and decrease transfer abilities   Treatment Plan may include any combination of the following: Therapeutic exercise, Therapeutic activities, Neuromuscular re-education, Physical agent/modality, Gait/balance training, Manual therapy, Patient education, Self Care training, Functional mobility training, and Other: dry needling  Patient / Family readiness to learn indicated by: asking questions, trying to perform skills, and interest  Persons(s) to be included in education: patient (P)  Barriers to Learning/Limitations: None  Patient Goal (s): I want to be able to  my children with less pain.   Patient Self Reported Health Status: excellent  Rehabilitation Potential: excellent    Short Term Goals: To be accomplished in 4 weeks:  Patient will be able to lift 10# from the floor without pain or limitation. Patient will be able to carry 10# at her right side for 50 ft. without pain or limitation. Patient will be able to demonstrate (-) B HAddDT to demonstrate pelvic neutrality during the gait cycle. Long Term Goals: To be accomplished in 12 weeks:  Patient will be able to lift 20# from the floor without pain or limitation. Patient will be able to carry 20# at her right side for 50 ft. Without pain or limitation. Patient will be able to demonstrate >3/5 HAddLT to allow for adequate lumbopelvic stability while lifting and carrying her children. Frequency / Duration: Patient to be seen 2 times per week for 12 weeks. Patient/ Caregiver education and instruction: self care, activity modification, and exercises    [x]  Plan of care has been reviewed with PETROS Hudson, PT 9/20/2022     ________________________________________________________________________    I certify that the above Therapy Services are being furnished while the patient is under my care. I agree with the treatment plan and certify that this therapy is necessary.     Physician's Signature:____________________  Date:____________Time: _________      Gissel Head *

## 2022-10-03 ENCOUNTER — HOSPITAL ENCOUNTER (OUTPATIENT)
Dept: PHYSICAL THERAPY | Age: 37
Discharge: HOME | End: 2022-10-03
Payer: COMMERCIAL

## 2022-10-03 PROCEDURE — 97112 NEUROMUSCULAR REEDUCATION: CPT | Performed by: PHYSICAL THERAPIST

## 2022-10-03 NOTE — PROGRESS NOTES
PT DAILY TREATMENT NOTE 2-15    Patient Name: Ruth Ann Styles  Date:10/3/2022  : 1985  [x]  Patient  Verified  Payor: Janet Delgado / Plan: Ayo Sharp / Product Type: HMO /    In time: 1:15 PM  Out time: 2:25 PM  Total Treatment Time (min): 70  Visit #:  2    Treatment Area: Other low back pain [M54.59]    SUBJECTIVE  Pain Level (0-10 scale): 10/10  Any medication changes, allergies to medications, adverse drug reactions, diagnosis change, or new procedure performed?: [x] No    [] Yes (see summary sheet for update)  Subjective functional status/changes:   [] No changes reported  Patient returns to the clinic in an acute muscle spasm at her low back. She went to  her son with extended arms yesterday and the back went into immediate spasm. She has been trying to perform her DOMONIQUE HEP, but is having difficulty slowing her breathing down.     OBJECTIVE    Modality rationale: decrease pain and increase tissue extensibility to improve the patients ability to walk without pain   Min Type Additional Details       [] Estim: []Att   []Unatt    []TENS instruct                  []IFC  []Premod   []NMES                     []Other:  []w/US   []w/ice   []w/heat  Position:  Location:       []  Traction: [] Cervical       []Lumbar                       [] Prone          []Supine                       []Intermittent   []Continuous Lbs:  [] before manual  [] after manual  []w/heat    []  Ultrasound: []Continuous   [] Pulsed                       at: []1MHz   []3MHz Location:  W/cm2:    [] Paraffin         Location:   []w/heat   15 []  Ice     [x]  Heat  []  Ice massage Position: supine  Location:low back    []  Laser  []  Other: Position:  Location:      []  Vasopneumatic Device Pressure:       [] lo [] med [] hi   Temperature:      [x] Skin assessment post-treatment:  [x]intact []redness- no adverse reaction    []redness - adverse reaction:          55 min Neuromuscular Re-education:  [x]  See flow sheet : Rationale: increase ROM, increase strength, and improve coordination  to improve the patients ability to bend forward without pain            With   [] TE   [] TA   [] Neuro   [] SC   [] other: Patient Education: [x] Review HEP    [] Progressed/Changed HEP based on:   [] positioning   [] body mechanics   [] transfers   [] heat/ice application    [x] other: PNE for acute pain    Reviewed DN and SMT, however patient was apprehensive about increased back pain following treatment     Other Objective/Functional Measures:   No DOMONIQUE testing on today's date    Patients respiratory rate was elevated initially, improved with verbal cueing and a review of relaxation techniques     Pain Level (0-10 scale) post treatment: 0    ASSESSMENT/Changes in Function:   Patient will continue to benefit from skilled PT services to modify and progress therapeutic interventions, address functional mobility deficits, address ROM deficits, address strength deficits, analyze and address soft tissue restrictions, analyze and cue movement patterns, analyze and modify body mechanics/ergonomics, and assess and modify postural abnormalities to attain remaining goals. []  See Plan of Care  []  See progress note/recertification  []  See Discharge Summary         Progress towards goals / Updated goals:  No significant progress towards goals on today's date. Will likely f/u next week to review other interventiosn.     PLAN  [x]  Upgrade activities as tolerated     [x]  Continue plan of care  []  Update interventions per flow sheet       []  Discharge due to:_  []  Other:_      Janki Plaza, PT 10/3/2022

## 2023-03-02 ENCOUNTER — OFFICE VISIT (OUTPATIENT)
Dept: INTERNAL MEDICINE CLINIC | Age: 38
End: 2023-03-02

## 2023-03-02 VITALS
DIASTOLIC BLOOD PRESSURE: 78 MMHG | RESPIRATION RATE: 16 BRPM | HEART RATE: 77 BPM | OXYGEN SATURATION: 98 % | TEMPERATURE: 97.9 F | SYSTOLIC BLOOD PRESSURE: 121 MMHG | BODY MASS INDEX: 27.16 KG/M2 | WEIGHT: 183.4 LBS | HEIGHT: 69 IN

## 2023-03-02 DIAGNOSIS — M70.61 TROCHANTERIC BURSITIS OF RIGHT HIP: ICD-10-CM

## 2023-03-02 DIAGNOSIS — Z00.00 WELL ADULT EXAM: Primary | ICD-10-CM

## 2023-03-02 DIAGNOSIS — Z78.9 VEGETARIAN DIET: ICD-10-CM

## 2023-03-02 NOTE — PROGRESS NOTES
Gallo Carvalho is a 40 y.o. female and presents with Physical, Labs (Nonfasting), and Gyn Exam  .      Subjective:    Patient presents for her annual.  She reports overall very good health. She is currently working at [employer] and doing financial work with them. She has restarted her running and raising her 3 boys with her . Her boys are 6, 3 and 2      Trochanter bursitis  She has started running and has pain at her right hip. Has not taken any NSAIDs yet      Brother diagnosed with ALL recently. Hyperlipidemia  LDL was 141 in 2017  She is eating a vegetarian type diet  Energy level good    GYN care  Working with Dr. Freddy Bui  History of positive HPV with subsequent negative Paps  Offered to do Pap but will follow-up with GYN    Review of Systems  Constitutional: negative for fevers, chills, anorexia and weight loss  Eyes:   negative for visual disturbance and irritation  ENT:   negative for tinnitus,sore throat,nasal congestion,ear pains. hoarseness  Respiratory:  negative for cough, hemoptysis, dyspnea,wheezing  CV:   negative for chest pain, palpitations, lower extremity edema  GI:   negative for nausea, vomiting, diarrhea, abdominal pain,melena  Endo:               negative for polyuria,polydipsia,polyphagia,heat intolerance  Genitourinary: negative for frequency, dysuria and hematuria  Integument:  negative for rash and pruritus  Hematologic:  negative for easy bruising and gum/nose bleeding  Musculoskel: negative for myalgias, arthralgias, back pain, muscle weakness, joint pain  Neurological:  negative for headaches, dizziness, vertigo, memory problems and gait   Behavl/Psych: negative for feelings of anxiety, depression, mood changes    Past Medical History:   Diagnosis Date    Abnormal Papanicolaou smear of cervix     colposcopy, dr. Kalie eckert    Arrhythmia     Pap smear for cervical cancer screening 05/03/2017 neg HPV POS    Vaginal delivery 02/05/2021     Past Surgical History: Procedure Laterality Date    HX HEENT      wisdom teeth extraction    HX HEENT      nasal fracture repair    HX OTHER SURGICAL  09/03/2019    vaginal delivery      Social History     Socioeconomic History    Marital status:    Tobacco Use    Smoking status: Never    Smokeless tobacco: Never   Vaping Use    Vaping Use: Never used   Substance and Sexual Activity    Alcohol use: Not Currently     Comment: occasional    Drug use: No    Sexual activity: Yes     Partners: Male     Birth control/protection: I.U.D.    Social History Narrative    Full time  for private school    Manageable stress        [place]         Not stressful              healthy        3 children all BOYS    10 yo    2 yo     3 yo         Family History   Problem Relation Age of Onset    Elevated Lipids Mother     No Known Problems Father     Leukemia Brother      REM    No Known Allergies    Objective:  Visit Vitals  /78 (BP 1 Location: Left upper arm, BP Patient Position: Sitting, BP Cuff Size: Adult)   Pulse 77   Temp 97.9 °F (36.6 °C) (Oral)   Resp 16   Ht 5' 9\" (1.753 m)   Wt 183 lb 6.4 oz (83.2 kg)   SpO2 98%   BMI 27.08 kg/m²     Physical Exam:   General appearance - alert, well appearing, and in no distress  Mental status - alert, oriented to person, place, and time  EYE-ANTONIO, EOMI, corneas normal, no foreign bodies, visual acuity normal both eyes, no periorbital cellulitis  ENT-ENT exam normal, no neck nodes or sinus tenderness  Nose - normal and patent, no erythema, discharge or polyps  Mouth - mucous membranes moist, pharynx normal without lesions  Neck - supple, no significant adenopathy   Chest - clear to auscultation, no wheezes, rales or rhonchi, symmetric air entry   Heart - normal rate, regular rhythm, normal S1, S2, no murmurs, rubs, clicks or gallops   Abdomen - soft, nontender, nondistended, no masses or organomegaly  Lymph- no adenopathy palpable  Ext-peripheral pulses normal, no pedal edema, no clubbing or cyanosis  Skin-Warm and dry. no hyperpigmentation, vitiligo, or suspicious lesions  Neuro -alert, oriented, normal speech, no focal findings or movement disorder noted  Neck-normal C-spine, no tenderness, full ROM without pain      Results for orders placed or performed in visit on 03/03/22   PAP IG, RFX APTIMA HPV ASCUS ([id number])   Result Value Ref Range    Diagnosis Comment     Specimen adequacy Comment     Clinician provided ICD10 Comment     Performed by: Comment     . Franklin Rico Note: Comment     Test methodology Comment     . Comment      Prevention    Cardiovascular profile  Family hx  Exercising:    Blood pressure:  Health healthy diet:  Diabetes:  Cholesterol:  Renal function:      Cancer risk profile  Mammogram had breast ultrsound 1.5 years ago, felt a lump benign  Lung  Colonoscopy  Skin nonhealing in 2 weeks right blood vessel on side of face benign, not bigger or changing in 4 years, followed by derm  Gyn abnormal bleeding/discharge/abd pain/pressure working with Dr. Anup Montes      Thyroid sx  weight    Osteopenia prevention  Calcium 1000mg/day yes  Vitamin D 800iu/day yes    Mental health scale:  9.5/10 not depressed or anxious  Situational job related  Depression  Anxiety  Sleep # of hours:  Energy Level:        Immunizations  TDAP  Pneumonia vaccine  Flu vaccine  Shingles vaccine  HPV    Diagnoses and all orders for this visit:    1. Well adult exam  Patient appears to be in overall good health  -     METABOLIC PANEL, COMPREHENSIVE; Future  -     LIPID PANEL; Future  -     CBC WITH AUTOMATED DIFF; Future  -     HEPATITIS C AB; Future    2. Vegetarian diet  Low oxalate diet given    3.  Trochanteric bursitis of right hip  We will use ibuprofen 600 mg every 6 hours for 3 days  If no improvement can go to Ortho on-call for injection

## 2023-06-19 ENCOUNTER — OFFICE VISIT (OUTPATIENT)
Age: 38
End: 2023-06-19
Payer: COMMERCIAL

## 2023-06-19 VITALS — SYSTOLIC BLOOD PRESSURE: 126 MMHG | BODY MASS INDEX: 26.43 KG/M2 | WEIGHT: 179 LBS | DIASTOLIC BLOOD PRESSURE: 76 MMHG

## 2023-06-19 DIAGNOSIS — Z01.419 WELL WOMAN EXAM WITH ROUTINE GYNECOLOGICAL EXAM: Primary | ICD-10-CM

## 2023-06-19 DIAGNOSIS — Z30.430 ENCOUNTER FOR IUD INSERTION: ICD-10-CM

## 2023-06-19 DIAGNOSIS — Z30.431 IUD CHECK UP: ICD-10-CM

## 2023-06-19 DIAGNOSIS — Z30.432 ENCOUNTER FOR IUD REMOVAL: ICD-10-CM

## 2023-06-19 PROCEDURE — 58301 REMOVE INTRAUTERINE DEVICE: CPT | Performed by: OBSTETRICS & GYNECOLOGY

## 2023-06-19 PROCEDURE — 58300 INSERT INTRAUTERINE DEVICE: CPT | Performed by: OBSTETRICS & GYNECOLOGY

## 2023-06-19 PROCEDURE — 99395 PREV VISIT EST AGE 18-39: CPT | Performed by: OBSTETRICS & GYNECOLOGY

## 2023-06-19 NOTE — PROGRESS NOTES
Annual exam    Anastasia Cash is a 40 y.o. presenting for annual exam. Her main concerns today include IUD check. Mirena IUD inserted 2021. She cannot feel her strings. No change in vaginal bleeding, amenorrheic on Mirena. Brother recently dx w leukemia. Recent PCP visit on 3/2/23. Ob/Gyn Hx:    -   all boys  LMP - No LMP recorded. (Menstrual status: IUD). Menses - IUD;with the following concerns: negative  Contraception - IUD  STI - denies  SA - yes    Health maintenance:  Pap - Last PAP was normal; 2022.   Gardasil - not completed      Past Medical History:   Diagnosis Date    Abnormal Papanicolaou smear of cervix     colposcopy, dr. luz garcia    Arrhythmia     Pap smear for cervical cancer screening 2017 neg HPV POS    Vaginal delivery 2021       Past Surgical History:   Procedure Laterality Date    HEENT      nasal fracture repair    HEENT      wisdom teeth extraction    OTHER SURGICAL HISTORY  2019    vaginal delivery        Family History   Problem Relation Age of Onset    Elevated Lipids Mother     No Known Problems Father     Leukemia Brother        Social History     Socioeconomic History    Marital status:      Spouse name: Not on file    Number of children: Not on file    Years of education: Not on file    Highest education level: Not on file   Occupational History    Not on file   Tobacco Use    Smoking status: Never    Smokeless tobacco: Never   Substance and Sexual Activity    Alcohol use: Not Currently    Drug use: No    Sexual activity: Not on file   Other Topics Concern    Not on file   Social History Narrative    Full time  for private school  Manageable stress    [employer]     Not stressful        healthy    3 children all BOYS  10 yo  2 yo   3 yo         Social Determinants of Health     Financial Resource Strain: Not on file   Food Insecurity: Not on file   Transportation Needs: Not

## 2023-06-20 LAB
ALBUMIN SERPL-MCNC: 4.6 G/DL (ref 3.8–4.8)
ALBUMIN/GLOB SERPL: 1.5 {RATIO} (ref 1.2–2.2)
ALP SERPL-CCNC: 61 IU/L (ref 44–121)
ALT SERPL-CCNC: 17 IU/L (ref 0–32)
AST SERPL-CCNC: 17 IU/L (ref 0–40)
BASOPHILS # BLD AUTO: 0.1 X10E3/UL (ref 0–0.2)
BASOPHILS NFR BLD AUTO: 1 %
BILIRUB SERPL-MCNC: 1.1 MG/DL (ref 0–1.2)
BUN SERPL-MCNC: 10 MG/DL (ref 6–20)
BUN/CREAT SERPL: 16 (ref 9–23)
CALCIUM SERPL-MCNC: 9.1 MG/DL (ref 8.7–10.2)
CHLORIDE SERPL-SCNC: 105 MMOL/L (ref 96–106)
CHOLEST SERPL-MCNC: 200 MG/DL (ref 100–199)
CO2 SERPL-SCNC: 22 MMOL/L (ref 20–29)
CREAT SERPL-MCNC: 0.63 MG/DL (ref 0.57–1)
EGFRCR SERPLBLD CKD-EPI 2021: 118 ML/MIN/1.73
EOSINOPHIL # BLD AUTO: 0.3 X10E3/UL (ref 0–0.4)
EOSINOPHIL NFR BLD AUTO: 4 %
ERYTHROCYTE [DISTWIDTH] IN BLOOD BY AUTOMATED COUNT: 13 % (ref 11.7–15.4)
GLOBULIN SER CALC-MCNC: 3 G/DL (ref 1.5–4.5)
GLUCOSE SERPL-MCNC: 83 MG/DL (ref 70–99)
HCT VFR BLD AUTO: 41.9 % (ref 34–46.6)
HCV IGG SERPL QL IA: NON REACTIVE
HDLC SERPL-MCNC: 59 MG/DL
HGB BLD-MCNC: 13.9 G/DL (ref 11.1–15.9)
IMM GRANULOCYTES # BLD AUTO: 0 X10E3/UL (ref 0–0.1)
IMM GRANULOCYTES NFR BLD AUTO: 0 %
IMP & REVIEW OF LAB RESULTS: NORMAL
LDLC SERPL CALC-MCNC: 126 MG/DL (ref 0–99)
LYMPHOCYTES # BLD AUTO: 2.8 X10E3/UL (ref 0.7–3.1)
LYMPHOCYTES NFR BLD AUTO: 34 %
MCH RBC QN AUTO: 28.6 PG (ref 26.6–33)
MCHC RBC AUTO-ENTMCNC: 33.2 G/DL (ref 31.5–35.7)
MCV RBC AUTO: 86 FL (ref 79–97)
MONOCYTES # BLD AUTO: 0.7 X10E3/UL (ref 0.1–0.9)
MONOCYTES NFR BLD AUTO: 8 %
NEUTROPHILS # BLD AUTO: 4.5 X10E3/UL (ref 1.4–7)
NEUTROPHILS NFR BLD AUTO: 53 %
PLATELET # BLD AUTO: 376 X10E3/UL (ref 150–450)
POTASSIUM SERPL-SCNC: 4.2 MMOL/L (ref 3.5–5.2)
PROT SERPL-MCNC: 7.6 G/DL (ref 6–8.5)
RBC # BLD AUTO: 4.86 X10E6/UL (ref 3.77–5.28)
SODIUM SERPL-SCNC: 139 MMOL/L (ref 134–144)
TRIGL SERPL-MCNC: 84 MG/DL (ref 0–149)
VLDLC SERPL CALC-MCNC: 15 MG/DL (ref 5–40)
WBC # BLD AUTO: 8.4 X10E3/UL (ref 3.4–10.8)

## 2023-06-28 LAB
., LABCORP: ABNORMAL
CYTOLOGIST CVX/VAG CYTO: ABNORMAL
CYTOLOGY CVX/VAG DOC CYTO: ABNORMAL
CYTOLOGY CVX/VAG DOC THIN PREP: ABNORMAL
DX ICD CODE: ABNORMAL
DX ICD CODE: ABNORMAL
HPV I/H RISK 4 DNA CVX QL PROBE+SIG AMP: POSITIVE
Lab: ABNORMAL
OTHER STN SPEC: ABNORMAL
PATHOLOGIST CVX/VAG CYTO: ABNORMAL
STAT OF ADQ CVX/VAG CYTO-IMP: ABNORMAL

## 2023-06-30 ENCOUNTER — PROCEDURE VISIT (OUTPATIENT)
Age: 38
End: 2023-06-30

## 2023-06-30 VITALS — SYSTOLIC BLOOD PRESSURE: 130 MMHG | DIASTOLIC BLOOD PRESSURE: 92 MMHG | BODY MASS INDEX: 25.99 KG/M2 | WEIGHT: 176 LBS

## 2023-06-30 DIAGNOSIS — R87.610 ATYPICAL SQUAMOUS CELLS OF UNDETERMINED SIGNIFICANCE ON CYTOLOGIC SMEAR OF CERVIX (ASC-US): ICD-10-CM

## 2023-06-30 DIAGNOSIS — Z98.890 HX OF COLPOSCOPY WITH CERVICAL BIOPSY: Primary | ICD-10-CM

## 2023-07-06 LAB
CPT BILLING CODE: NORMAL
DIAGNOSIS SYNOPSIS:: NORMAL
DX ICD CODE: NORMAL
Lab: NORMAL
PATH REPORT.COMMENTS IMP SPEC: NORMAL
PATH REPORT.FINAL DX SPEC: NORMAL
PATH REPORT.GROSS SPEC: NORMAL
PATH REPORT.SITE OF ORIGIN SPEC: NORMAL
PATHOLOGIST NAME: NORMAL
PAYMENT PROCEDURE: NORMAL

## 2024-02-13 ENCOUNTER — OFFICE VISIT (OUTPATIENT)
Age: 39
End: 2024-02-13

## 2024-02-13 VITALS
WEIGHT: 185.6 LBS | HEIGHT: 69 IN | DIASTOLIC BLOOD PRESSURE: 70 MMHG | BODY MASS INDEX: 27.49 KG/M2 | SYSTOLIC BLOOD PRESSURE: 102 MMHG

## 2024-02-13 DIAGNOSIS — Z01.419 WELL WOMAN EXAM: Primary | ICD-10-CM

## 2024-02-13 NOTE — PROGRESS NOTES
Annual exam    Jenna Adrian is a 38 y.o. presenting for annual exam. Her main concerns today include wanting a repeat pap.  She had an abnormal pap in  last year followed by a normal colpo.  She has an IUD for contraception and is happy with that.  She declines STD testing.  She reports a warm sensation in her leg and foot around ovulation time.  She is wondering if this could be connected.    She declines / accepts a chaperone during the gynecologic exam today.     Ob/Gyn Hx:  G P  -   LMP - 24  Menses - 4d  Contraception - IUD  STI - Declined testing  SA - Yes    Health maintenance:  Pap - Today-f/u abnormal  Gardasil - Has not had series      Past Medical History:   Diagnosis Date    Abnormal Papanicolaou smear of cervix     colposcopy, dr. luz garcia    Arrhythmia     Pap smear for cervical cancer screening 2017 neg HPV POS    Vaginal delivery 2021       Past Surgical History:   Procedure Laterality Date    HEENT      nasal fracture repair    HEENT      wisdom teeth extraction    OTHER SURGICAL HISTORY  2019    vaginal delivery        Family History   Problem Relation Age of Onset    Elevated Lipids Mother     No Known Problems Father     Leukemia Brother        Social History     Socioeconomic History    Marital status:      Spouse name: Not on file    Number of children: Not on file    Years of education: Not on file    Highest education level: Not on file   Occupational History    Not on file   Tobacco Use    Smoking status: Never     Passive exposure: Never    Smokeless tobacco: Never   Substance and Sexual Activity    Alcohol use: Not Currently    Drug use: No    Sexual activity: Not on file   Other Topics Concern    Not on file   Social History Narrative    Full time  for private school  Manageable stress    [place]     Not stressful        healthy    3 children all BOYS  5 yo  3 yo   3 yo         Social

## 2024-02-20 LAB
CYTOLOGIST CVX/VAG CYTO: NORMAL
CYTOLOGY CVX/VAG DOC CYTO: NORMAL
CYTOLOGY CVX/VAG DOC THIN PREP: NORMAL
DX ICD CODE: NORMAL
HPV GENOTYPE REFLEX: NORMAL
HPV I/H RISK 4 DNA CVX QL PROBE+SIG AMP: NEGATIVE
Lab: NORMAL
Lab: NORMAL
OTHER STN SPEC: NORMAL
STAT OF ADQ CVX/VAG CYTO-IMP: NORMAL

## 2024-08-09 ENCOUNTER — OFFICE VISIT (OUTPATIENT)
Age: 39
End: 2024-08-09

## 2024-08-09 VITALS
DIASTOLIC BLOOD PRESSURE: 70 MMHG | HEART RATE: 62 BPM | TEMPERATURE: 98.4 F | HEIGHT: 69 IN | RESPIRATION RATE: 18 BRPM | OXYGEN SATURATION: 99 % | WEIGHT: 183.8 LBS | SYSTOLIC BLOOD PRESSURE: 118 MMHG | BODY MASS INDEX: 27.22 KG/M2

## 2024-08-09 DIAGNOSIS — N30.01 ACUTE CYSTITIS WITH HEMATURIA: Primary | ICD-10-CM

## 2024-08-09 DIAGNOSIS — R35.0 URINARY FREQUENCY: ICD-10-CM

## 2024-08-09 LAB
BILIRUBIN, URINE, POC: NEGATIVE
BLOOD URINE, POC: ABNORMAL
GLUCOSE URINE, POC: NEGATIVE
KETONES, URINE, POC: NEGATIVE
LEUKOCYTE ESTERASE, URINE, POC: ABNORMAL
NITRITE, URINE, POC: ABNORMAL
PH, URINE, POC: 5.5 (ref 4.6–8)
PROTEIN,URINE, POC: ABNORMAL
SPECIFIC GRAVITY, URINE, POC: 1.02 (ref 1–1.03)
URINALYSIS CLARITY, POC: ABNORMAL
URINALYSIS COLOR, POC: YELLOW
UROBILINOGEN, POC: ABNORMAL

## 2024-08-09 RX ORDER — NITROFURANTOIN 25; 75 MG/1; MG/1
100 CAPSULE ORAL 2 TIMES DAILY
Qty: 20 CAPSULE | Refills: 0 | Status: SHIPPED | OUTPATIENT
Start: 2024-08-09 | End: 2024-08-19

## 2024-08-09 NOTE — PATIENT INSTRUCTIONS
Urinary tract infection -  Culture sent, we will call with results  Macrobid twice daily for 10 days  Drink plenty fluids  Call or return to clinic if no improvement or any worsening

## 2024-08-09 NOTE — PROGRESS NOTES
General: There is no distension.      Tenderness: There is abdominal tenderness in the suprapubic area. There is no right CVA tenderness, left CVA tenderness or guarding.   Skin:     General: Skin is warm and dry.   Neurological:      General: No focal deficit present.      Mental Status: She is alert and oriented to person, place, and time.   Psychiatric:         Mood and Affect: Mood normal.         Behavior: Behavior normal.         Thought Content: Thought content normal.         Judgment: Judgment normal.          ASSESSMENT/PLAN:  1. Acute cystitis with hematuria  -     Urine culture (clean catch); Future  -     nitrofurantoin, macrocrystal-monohydrate, (MACROBID) 100 MG capsule; Take 1 capsule by mouth 2 times daily for 10 days, Disp-20 capsule, R-0Normal  2. Urinary frequency  -     AMB POC URINALYSIS DIP STICK MANUAL W/O MICRO      Urinary tract infection -  UA positive for leukocyte esterase and blood  Culture sent, we will call with results  Macrobid twice daily for 10 days  Drink plenty fluids  Call or return to clinic if no improvement or any worsening   Patient comfortable with plan       An electronic signature was used to authenticate this note.    Bridgette Polo PA-C

## 2024-08-10 LAB
BACTERIA SPEC CULT: ABNORMAL
CC UR VC: ABNORMAL
SERVICE CMNT-IMP: ABNORMAL

## 2024-08-12 LAB
BACTERIA SPEC CULT: ABNORMAL
CC UR VC: ABNORMAL
SERVICE CMNT-IMP: ABNORMAL

## 2024-10-14 ENCOUNTER — OFFICE VISIT (OUTPATIENT)
Age: 39
End: 2024-10-14
Payer: COMMERCIAL

## 2024-10-14 VITALS
SYSTOLIC BLOOD PRESSURE: 106 MMHG | WEIGHT: 183.6 LBS | HEART RATE: 75 BPM | OXYGEN SATURATION: 100 % | RESPIRATION RATE: 16 BRPM | DIASTOLIC BLOOD PRESSURE: 68 MMHG | TEMPERATURE: 98.7 F | BODY MASS INDEX: 27.19 KG/M2 | HEIGHT: 69 IN

## 2024-10-14 DIAGNOSIS — R22.41 LUMP OF SKIN OF RIGHT LOWER EXTREMITY: Primary | ICD-10-CM

## 2024-10-14 PROCEDURE — 99213 OFFICE O/P EST LOW 20 MIN: CPT | Performed by: NURSE PRACTITIONER

## 2024-10-14 ASSESSMENT — ENCOUNTER SYMPTOMS
COUGH: 0
ABDOMINAL PAIN: 0
SHORTNESS OF BREATH: 0
EYE REDNESS: 0
RESPIRATORY NEGATIVE: 1
NAUSEA: 0
RHINORRHEA: 0
BACK PAIN: 0
VOMITING: 0
SINUS PRESSURE: 0
CONSTIPATION: 0
CHEST TIGHTNESS: 0
EYES NEGATIVE: 1
DIARRHEA: 0
SINUS PAIN: 0
GASTROINTESTINAL NEGATIVE: 1
EYE PAIN: 0
BLOOD IN STOOL: 0

## 2024-10-14 ASSESSMENT — PATIENT HEALTH QUESTIONNAIRE - PHQ9
SUM OF ALL RESPONSES TO PHQ QUESTIONS 1-9: 0
1. LITTLE INTEREST OR PLEASURE IN DOING THINGS: NOT AT ALL
SUM OF ALL RESPONSES TO PHQ QUESTIONS 1-9: 0
SUM OF ALL RESPONSES TO PHQ9 QUESTIONS 1 & 2: 0
2. FEELING DOWN, DEPRESSED OR HOPELESS: NOT AT ALL

## 2024-10-14 NOTE — PROGRESS NOTES
Assessment and Plan     1. Lump of skin of right lower extremity: Warm compression, Ibuprofen for pain/discomfort as needed recommended. Imaging studies ordered. Pt agreed with plan   -     US EXTREMITY RIGHT NON VASC LIMITED; Future       Benefits, risks, possible drug interactions, and side effects of all new medications were reviewed with the patient. Pt verbalized understanding.    An electronic signature was used to authenticate this note.  Nimco Mariolamarlene El, APRN - CNP  10/14/2024      Follow-up and Dispositions    Return if symptoms worsen or fail to improve.          History of Present Illness   Chief Complaint     Jenna Adrian is a 38 y.o. female here for had concerns including Mass.   Mrs. Adrian presents today with reports of growth to R lateral foot area, noted a month ago. Positive for discomfort from walking. Admits wearing sandals throughout the summer which she attributes contributes to current symptoms. Wearing tennis shows has improved discomfort and size. Denies erythema or edema to area. Denies recent trauma or injuries.     Review of Systems  Review of Systems   Constitutional: Negative.  Negative for chills, fatigue, fever and unexpected weight change.   HENT: Negative.  Negative for congestion, rhinorrhea, sinus pressure and sinus pain.    Eyes: Negative.  Negative for pain, redness and visual disturbance.   Respiratory: Negative.  Negative for cough, chest tightness and shortness of breath.    Cardiovascular: Negative.  Negative for chest pain and palpitations.   Gastrointestinal: Negative.  Negative for abdominal pain, blood in stool, constipation, diarrhea, nausea and vomiting.   Endocrine: Negative.  Negative for polydipsia, polyphagia and polyuria.   Genitourinary: Negative.  Negative for difficulty urinating, dysuria, frequency and urgency.   Musculoskeletal: Negative.  Negative for back pain.   Skin:  Negative for wound.        Lump to R foot   Neurological: Negative.  Negative

## 2024-10-18 ENCOUNTER — HOSPITAL ENCOUNTER (OUTPATIENT)
Facility: HOSPITAL | Age: 39
Discharge: HOME | End: 2024-10-21
Payer: COMMERCIAL

## 2024-10-18 DIAGNOSIS — R22.41 LUMP OF SKIN OF RIGHT LOWER EXTREMITY: ICD-10-CM

## 2024-10-18 PROCEDURE — 76882 US LMTD JT/FCL EVL NVASC XTR: CPT

## 2025-04-10 ENCOUNTER — OFFICE VISIT (OUTPATIENT)
Facility: CLINIC | Age: 40
End: 2025-04-10
Payer: COMMERCIAL

## 2025-04-10 VITALS
SYSTOLIC BLOOD PRESSURE: 121 MMHG | DIASTOLIC BLOOD PRESSURE: 79 MMHG | HEART RATE: 76 BPM | BODY MASS INDEX: 27.88 KG/M2 | HEIGHT: 69 IN | OXYGEN SATURATION: 98 % | RESPIRATION RATE: 14 BRPM | WEIGHT: 188.2 LBS

## 2025-04-10 DIAGNOSIS — M67.471 GANGLION CYST OF RIGHT FOOT: ICD-10-CM

## 2025-04-10 DIAGNOSIS — Z00.00 WELL ADULT EXAM: Primary | ICD-10-CM

## 2025-04-10 DIAGNOSIS — Z00.00 WELL ADULT EXAM: ICD-10-CM

## 2025-04-10 PROCEDURE — 99395 PREV VISIT EST AGE 18-39: CPT | Performed by: INTERNAL MEDICINE

## 2025-04-10 RX ORDER — CETIRIZINE HYDROCHLORIDE 10 MG/1
10 TABLET ORAL DAILY
COMMUNITY

## 2025-04-10 SDOH — ECONOMIC STABILITY: FOOD INSECURITY: WITHIN THE PAST 12 MONTHS, YOU WORRIED THAT YOUR FOOD WOULD RUN OUT BEFORE YOU GOT MONEY TO BUY MORE.: NEVER TRUE

## 2025-04-10 SDOH — ECONOMIC STABILITY: FOOD INSECURITY: WITHIN THE PAST 12 MONTHS, THE FOOD YOU BOUGHT JUST DIDN'T LAST AND YOU DIDN'T HAVE MONEY TO GET MORE.: NEVER TRUE

## 2025-04-10 ASSESSMENT — PATIENT HEALTH QUESTIONNAIRE - PHQ9
SUM OF ALL RESPONSES TO PHQ QUESTIONS 1-9: 0
SUM OF ALL RESPONSES TO PHQ QUESTIONS 1-9: 0
2. FEELING DOWN, DEPRESSED OR HOPELESS: NOT AT ALL
SUM OF ALL RESPONSES TO PHQ QUESTIONS 1-9: 0
SUM OF ALL RESPONSES TO PHQ QUESTIONS 1-9: 0
1. LITTLE INTEREST OR PLEASURE IN DOING THINGS: NOT AT ALL

## 2025-04-10 NOTE — PROGRESS NOTES
Assessment & Plan  1. Ganglion cyst on the right foot.  The cyst measures approximately 1.3 cm and is not causing significant discomfort. It is not getting bigger. It appears benign. An MRI is not deemed necessary at this time. She has been advised to monitor the cyst for any changes in size or associated pain. A referral to a podiatrist will be made for further evaluation. If the cyst increases in size or causes pain, surgical removal will be considered.    2. Well adult visit.  Patient reports overall very good health.  Patient will be transitioning to [place].  She is the  for [employer].  Her  and family will be meeting with her.      Her cholesterol levels are within normal limits. She has a family history of high cholesterol and leukemia in her brother.      She has been advised to continue her weight training regimen. A comprehensive set of labs will be ordered, including A1c, liver function tests, kidney function tests, glucose, cholesterol, and thyroid function tests. A titer test will also be conducted to assess her immunity status.          Jenna Adrian is a 37 y.o. female and presents with Physical, Labs (Nonfasting),  .    History of Present Illness  The patient presents for evaluation of a ganglion cyst on her right foot and for a well adult visit.    Ganglion Cyst on Right Foot  The patient reports a lump on the lateral aspect of her right foot, present for at least six months, confirmed via ultrasonography as a ganglion cyst. The cyst is asymptomatic but occasionally causes discomfort, particularly after prolonged shoe wear. The patient suspects that a pair of sandals worn last summer may have contributed to its development. The cyst has remained stable in size and does not impede her ability to wear tennis shoes or engage in physical activities such as exercise. No similar lesions are reported on her hands, and there is no family history of ganglion cysts. The patient

## 2025-04-26 LAB
ALBUMIN SERPL-MCNC: 4.4 G/DL (ref 3.9–4.9)
ALP SERPL-CCNC: 59 IU/L (ref 44–121)
ALT SERPL-CCNC: 18 IU/L (ref 0–32)
AST SERPL-CCNC: 16 IU/L (ref 0–40)
BILIRUB SERPL-MCNC: 0.7 MG/DL (ref 0–1.2)
BUN SERPL-MCNC: 20 MG/DL (ref 6–20)
BUN/CREAT SERPL: 23 (ref 9–23)
CALCIUM SERPL-MCNC: 9.2 MG/DL (ref 8.7–10.2)
CHLORIDE SERPL-SCNC: 104 MMOL/L (ref 96–106)
CHOLEST SERPL-MCNC: 201 MG/DL (ref 100–199)
CO2 SERPL-SCNC: 21 MMOL/L (ref 20–29)
CREAT SERPL-MCNC: 0.86 MG/DL (ref 0.57–1)
EGFRCR SERPLBLD CKD-EPI 2021: 89 ML/MIN/1.73
GLOBULIN SER CALC-MCNC: 2.7 G/DL (ref 1.5–4.5)
GLUCOSE SERPL-MCNC: 89 MG/DL (ref 70–99)
HBA1C MFR BLD: 5.4 % (ref 4.8–5.6)
HDLC SERPL-MCNC: 54 MG/DL
IMP & REVIEW OF LAB RESULTS: NORMAL
LDLC SERPL CALC-MCNC: 137 MG/DL (ref 0–99)
MEV IGG SER IA-ACNC: 34.6 AU/ML
MUV IGG SER IA-ACNC: 124 AU/ML
POTASSIUM SERPL-SCNC: 4.9 MMOL/L (ref 3.5–5.2)
PROT SERPL-MCNC: 7.1 G/DL (ref 6–8.5)
RUBV IGG SERPL IA-ACNC: 3.56 INDEX
SODIUM SERPL-SCNC: 139 MMOL/L (ref 134–144)
T4 FREE SERPL-MCNC: 1.29 NG/DL (ref 0.82–1.77)
TRIGL SERPL-MCNC: 54 MG/DL (ref 0–149)
TSH SERPL DL<=0.005 MIU/L-ACNC: 1.17 UIU/ML (ref 0.45–4.5)
VLDLC SERPL CALC-MCNC: 10 MG/DL (ref 5–40)

## 2025-04-27 ENCOUNTER — RESULTS FOLLOW-UP (OUTPATIENT)
Facility: CLINIC | Age: 40
End: 2025-04-27

## 2025-05-08 ENCOUNTER — OFFICE VISIT (OUTPATIENT)
Age: 40
End: 2025-05-08
Payer: COMMERCIAL

## 2025-05-08 VITALS — HEIGHT: 69 IN | BODY MASS INDEX: 27.4 KG/M2 | WEIGHT: 185 LBS

## 2025-05-08 DIAGNOSIS — Z01.419 ENCOUNTER FOR GYNECOLOGICAL EXAMINATION: ICD-10-CM

## 2025-05-08 DIAGNOSIS — Z01.419 WELL WOMAN EXAM: Primary | ICD-10-CM

## 2025-05-08 DIAGNOSIS — Z12.4 CERVICAL CANCER SCREENING: ICD-10-CM

## 2025-05-08 PROCEDURE — 99395 PREV VISIT EST AGE 18-39: CPT | Performed by: ADVANCED PRACTICE MIDWIFE

## 2025-05-08 RX ORDER — LEVONORGESTREL 52 MG/1
1 INTRAUTERINE DEVICE INTRAUTERINE ONCE
COMMUNITY

## 2025-05-08 NOTE — PROGRESS NOTES
Annual Gyn Exam    Patient presents for annual exam.    Cycles:light to no cycles    Birth control:Mirena IUD    Mammogram:n/a    Pap: negative 2024    Colonoscopy:    1. Have you been to the ER, urgent care clinic, or hospitalized since your last visit? no    2. Have you seen or consulted any other health care providers outside of the [facility] since your last visit? no      Geno Cabrera LPN

## 2025-05-08 NOTE — PROGRESS NOTES
Annual exam    Annual Gyn Exam- moving to [place] this July, recommendations requested for OBGYN in [facility] information given     Patient presents for annual exam.     Cycles:light to no cycles     Birth control:Mirena IUD     Mammogram:n/a     Pap: negative 2024     Colonoscopy:       Past Medical History:   Diagnosis Date    Abnormal Papanicolaou smear of cervix     colposcopy, dr. luz garcia    Arrhythmia     Pap smear for cervical cancer screening 05/03/2017 neg HPV POS    Vaginal delivery 02/05/2021       Past Surgical History:   Procedure Laterality Date    HEENT      nasal fracture repair    HEENT      wisdom teeth extraction    OTHER SURGICAL HISTORY  09/03/2019    vaginal delivery        Family History   Problem Relation Age of Onset    Elevated Lipids Mother     No Known Problems Father     Leukemia Brother        Social History     Socioeconomic History    Marital status:      Spouse name: Not on file    Number of children: Not on file    Years of education: Not on file    Highest education level: Not on file   Occupational History    Not on file   Tobacco Use    Smoking status: Never     Passive exposure: Never    Smokeless tobacco: Never   Substance and Sexual Activity    Alcohol use: Yes     Comment: occ    Drug use: No    Sexual activity: Not on file   Other Topics Concern    Not on file   Social History Narrative    Full time  for [employer]    Not stressful        healthy    3 children all BOYS  7 yo  3 yo   3 yo         Social Drivers of Health     Financial Resource Strain: Not on file   Food Insecurity: No Food Insecurity (4/10/2025)    Hunger Vital Sign     Worried About Running Out of Food in the Last Year: Never true     Ran Out of Food in the Last Year: Never true   Transportation Needs: No Transportation Needs (4/10/2025)    PRAPARE - Transportation     Lack of Transportation (Medical): No     Lack of Transportation

## 2025-05-14 ENCOUNTER — RESULTS FOLLOW-UP (OUTPATIENT)
Age: 40
End: 2025-05-14

## 2025-05-14 LAB
CYTOLOGIST CVX/VAG CYTO: NORMAL
CYTOLOGY CVX/VAG DOC CYTO: NORMAL
CYTOLOGY CVX/VAG DOC THIN PREP: NORMAL
DX ICD CODE: NORMAL
HPV GENOTYPE REFLEX: NORMAL
HPV I/H RISK 4 DNA CVX QL PROBE+SIG AMP: NEGATIVE
OTHER STN SPEC: NORMAL
SERVICE CMNT-IMP: NORMAL
STAT OF ADQ CVX/VAG CYTO-IMP: NORMAL